# Patient Record
Sex: MALE | Race: WHITE | Employment: OTHER | URBAN - METROPOLITAN AREA
[De-identification: names, ages, dates, MRNs, and addresses within clinical notes are randomized per-mention and may not be internally consistent; named-entity substitution may affect disease eponyms.]

---

## 2018-01-12 ENCOUNTER — HOSPITAL ENCOUNTER (EMERGENCY)
Age: 58
Discharge: HOME | End: 2018-01-13
Attending: EMERGENCY MEDICINE
Payer: MEDICARE

## 2018-01-12 ENCOUNTER — APPOINTMENT (OUTPATIENT)
Dept: CT IMAGING | Age: 58
End: 2018-01-12
Attending: EMERGENCY MEDICINE
Payer: MEDICARE

## 2018-01-12 ENCOUNTER — APPOINTMENT (OUTPATIENT)
Dept: GENERAL RADIOLOGY | Age: 58
End: 2018-01-12
Attending: EMERGENCY MEDICINE
Payer: MEDICARE

## 2018-01-12 DIAGNOSIS — K59.00 CONSTIPATION, UNSPECIFIED CONSTIPATION TYPE: ICD-10-CM

## 2018-01-12 DIAGNOSIS — R10.84 ABDOMINAL PAIN, GENERALIZED: ICD-10-CM

## 2018-01-12 DIAGNOSIS — R53.1 GENERALIZED WEAKNESS: Primary | ICD-10-CM

## 2018-01-12 DIAGNOSIS — E86.0 DEHYDRATION: ICD-10-CM

## 2018-01-12 DIAGNOSIS — R63.4 WEIGHT LOSS: ICD-10-CM

## 2018-01-12 DIAGNOSIS — R06.02 SOB (SHORTNESS OF BREATH): ICD-10-CM

## 2018-01-12 LAB
ALBUMIN SERPL-MCNC: 3.4 G/DL (ref 3.4–5)
ALBUMIN/GLOB SERPL: 1.1 {RATIO} (ref 0.8–1.7)
ALP SERPL-CCNC: 86 U/L (ref 45–117)
ALT SERPL-CCNC: 20 U/L (ref 16–61)
AMPHET UR QL SCN: NEGATIVE
ANION GAP BLD CALC-SCNC: 25 MMOL/L (ref 10–20)
ANION GAP SERPL CALC-SCNC: 20 MMOL/L (ref 3–18)
ANION GAP SERPL CALC-SCNC: 9 MMOL/L (ref 3–18)
APPEARANCE UR: CLEAR
AST SERPL-CCNC: 13 U/L (ref 15–37)
ATRIAL RATE: 90 BPM
BACTERIA URNS QL MICRO: NEGATIVE /HPF
BARBITURATES UR QL SCN: NEGATIVE
BENZODIAZ UR QL: NEGATIVE
BILIRUB SERPL-MCNC: 0.6 MG/DL (ref 0.2–1)
BILIRUB UR QL: NEGATIVE
BNP SERPL-MCNC: 35 PG/ML (ref 0–900)
BUN BLD-MCNC: 21 MG/DL (ref 7–18)
BUN SERPL-MCNC: 19 MG/DL (ref 7–18)
BUN SERPL-MCNC: 24 MG/DL (ref 7–18)
BUN/CREAT SERPL: 23 (ref 12–20)
BUN/CREAT SERPL: 25 (ref 12–20)
CA-I BLD-MCNC: 1.27 MMOL/L (ref 1.12–1.32)
CALCIUM SERPL-MCNC: 7.7 MG/DL (ref 8.5–10.1)
CALCIUM SERPL-MCNC: 9.6 MG/DL (ref 8.5–10.1)
CALCULATED P AXIS, ECG09: 90 DEGREES
CALCULATED R AXIS, ECG10: 66 DEGREES
CALCULATED T AXIS, ECG11: 63 DEGREES
CANNABINOIDS UR QL SCN: NEGATIVE
CAOX CRY URNS QL MICRO: ABNORMAL
CHLORIDE BLD-SCNC: 105 MMOL/L (ref 100–108)
CHLORIDE SERPL-SCNC: 106 MMOL/L (ref 100–108)
CHLORIDE SERPL-SCNC: 117 MMOL/L (ref 100–108)
CK MB CFR SERPL CALC: ABNORMAL % (ref 0–4)
CK MB CFR SERPL CALC: ABNORMAL % (ref 0–4)
CK MB SERPL-MCNC: <1 NG/ML (ref 5–25)
CK MB SERPL-MCNC: <1 NG/ML (ref 5–25)
CK SERPL-CCNC: 29 U/L (ref 39–308)
CK SERPL-CCNC: 38 U/L (ref 39–308)
CO2 BLD-SCNC: 23 MMOL/L (ref 19–24)
CO2 SERPL-SCNC: 22 MMOL/L (ref 21–32)
CO2 SERPL-SCNC: 25 MMOL/L (ref 21–32)
COCAINE UR QL SCN: NEGATIVE
COLOR UR: ABNORMAL
CREAT SERPL-MCNC: 0.77 MG/DL (ref 0.6–1.3)
CREAT SERPL-MCNC: 1.05 MG/DL (ref 0.6–1.3)
CREAT UR-MCNC: 0.9 MG/DL (ref 0.6–1.3)
DIAGNOSIS, 93000: NORMAL
EPITH CASTS URNS QL MICRO: ABNORMAL /LPF (ref 0–5)
ERYTHROCYTE [DISTWIDTH] IN BLOOD BY AUTOMATED COUNT: 16.3 % (ref 11.6–14.5)
ETHANOL SERPL-MCNC: <3 MG/DL (ref 0–3)
FLUAV AG NPH QL IA: NEGATIVE
FLUBV AG NOSE QL IA: NEGATIVE
GLOBULIN SER CALC-MCNC: 3.2 G/DL (ref 2–4)
GLUCOSE BLD STRIP.AUTO-MCNC: 119 MG/DL (ref 74–106)
GLUCOSE BLD STRIP.AUTO-MCNC: 92 MG/DL (ref 70–110)
GLUCOSE SERPL-MCNC: 100 MG/DL (ref 74–99)
GLUCOSE SERPL-MCNC: 151 MG/DL (ref 74–99)
GLUCOSE UR STRIP.AUTO-MCNC: >1000 MG/DL
HCT VFR BLD AUTO: 41.8 % (ref 36–48)
HCT VFR BLD CALC: 34 % (ref 36–49)
HDSCOM,HDSCOM: NORMAL
HGB BLD-MCNC: 11.6 G/DL (ref 12–16)
HGB BLD-MCNC: 13.8 G/DL (ref 13–16)
HGB UR QL STRIP: NEGATIVE
KETONES UR QL STRIP.AUTO: >160 MG/DL
LACTATE BLD-SCNC: 1.4 MMOL/L (ref 0.4–2)
LACTATE BLD-SCNC: 3.4 MMOL/L (ref 0.4–2)
LEUKOCYTE ESTERASE UR QL STRIP.AUTO: NEGATIVE
LIPASE SERPL-CCNC: 54 U/L (ref 73–393)
MCH RBC QN AUTO: 29.4 PG (ref 24–34)
MCHC RBC AUTO-ENTMCNC: 33 G/DL (ref 31–37)
MCV RBC AUTO: 89.1 FL (ref 74–97)
METHADONE UR QL: NEGATIVE
NITRITE UR QL STRIP.AUTO: NEGATIVE
OPIATES UR QL: NEGATIVE
P-R INTERVAL, ECG05: 154 MS
PCP UR QL: NEGATIVE
PH UR STRIP: 6 [PH] (ref 5–8)
PLATELET # BLD AUTO: 188 K/UL (ref 135–420)
PMV BLD AUTO: 11.3 FL (ref 9.2–11.8)
POTASSIUM BLD-SCNC: 3.6 MMOL/L (ref 3.5–5.5)
POTASSIUM SERPL-SCNC: 3.7 MMOL/L (ref 3.5–5.5)
POTASSIUM SERPL-SCNC: 4 MMOL/L (ref 3.5–5.5)
PROT SERPL-MCNC: 6.6 G/DL (ref 6.4–8.2)
PROT UR STRIP-MCNC: ABNORMAL MG/DL
Q-T INTERVAL, ECG07: 368 MS
QRS DURATION, ECG06: 78 MS
QTC CALCULATION (BEZET), ECG08: 450 MS
RBC # BLD AUTO: 4.69 M/UL (ref 4.7–5.5)
RBC #/AREA URNS HPF: ABNORMAL /HPF (ref 0–5)
SODIUM BLD-SCNC: 148 MMOL/L (ref 136–145)
SODIUM SERPL-SCNC: 148 MMOL/L (ref 136–145)
SODIUM SERPL-SCNC: 151 MMOL/L (ref 136–145)
SP GR UR REFRACTOMETRY: >1.03 (ref 1–1.03)
TROPONIN I SERPL-MCNC: <0.02 NG/ML (ref 0–0.04)
TROPONIN I SERPL-MCNC: <0.02 NG/ML (ref 0–0.04)
UROBILINOGEN UR QL STRIP.AUTO: 1 EU/DL (ref 0.2–1)
VENTRICULAR RATE, ECG03: 90 BPM
WBC # BLD AUTO: 7.9 K/UL (ref 4.6–13.2)
WBC URNS QL MICRO: ABNORMAL /HPF (ref 0–4)

## 2018-01-12 PROCEDURE — 74011636320 HC RX REV CODE- 636/320: Performed by: EMERGENCY MEDICINE

## 2018-01-12 PROCEDURE — 87040 BLOOD CULTURE FOR BACTERIA: CPT | Performed by: EMERGENCY MEDICINE

## 2018-01-12 PROCEDURE — 80048 BASIC METABOLIC PNL TOTAL CA: CPT | Performed by: EMERGENCY MEDICINE

## 2018-01-12 PROCEDURE — 83880 ASSAY OF NATRIURETIC PEPTIDE: CPT | Performed by: EMERGENCY MEDICINE

## 2018-01-12 PROCEDURE — 93005 ELECTROCARDIOGRAM TRACING: CPT

## 2018-01-12 PROCEDURE — 83690 ASSAY OF LIPASE: CPT | Performed by: EMERGENCY MEDICINE

## 2018-01-12 PROCEDURE — 80047 BASIC METABLC PNL IONIZED CA: CPT

## 2018-01-12 PROCEDURE — 85027 COMPLETE CBC AUTOMATED: CPT | Performed by: EMERGENCY MEDICINE

## 2018-01-12 PROCEDURE — 74011250636 HC RX REV CODE- 250/636: Performed by: EMERGENCY MEDICINE

## 2018-01-12 PROCEDURE — 83605 ASSAY OF LACTIC ACID: CPT

## 2018-01-12 PROCEDURE — 87804 INFLUENZA ASSAY W/OPTIC: CPT | Performed by: EMERGENCY MEDICINE

## 2018-01-12 PROCEDURE — 96361 HYDRATE IV INFUSION ADD-ON: CPT

## 2018-01-12 PROCEDURE — 80307 DRUG TEST PRSMV CHEM ANLYZR: CPT | Performed by: EMERGENCY MEDICINE

## 2018-01-12 PROCEDURE — 96374 THER/PROPH/DIAG INJ IV PUSH: CPT

## 2018-01-12 PROCEDURE — 82550 ASSAY OF CK (CPK): CPT | Performed by: EMERGENCY MEDICINE

## 2018-01-12 PROCEDURE — 99285 EMERGENCY DEPT VISIT HI MDM: CPT

## 2018-01-12 PROCEDURE — 74011000258 HC RX REV CODE- 258: Performed by: EMERGENCY MEDICINE

## 2018-01-12 PROCEDURE — 80053 COMPREHEN METABOLIC PANEL: CPT | Performed by: EMERGENCY MEDICINE

## 2018-01-12 PROCEDURE — 82962 GLUCOSE BLOOD TEST: CPT

## 2018-01-12 PROCEDURE — 71275 CT ANGIOGRAPHY CHEST: CPT

## 2018-01-12 PROCEDURE — 81001 URINALYSIS AUTO W/SCOPE: CPT | Performed by: EMERGENCY MEDICINE

## 2018-01-12 PROCEDURE — 71045 X-RAY EXAM CHEST 1 VIEW: CPT

## 2018-01-12 PROCEDURE — 74177 CT ABD & PELVIS W/CONTRAST: CPT

## 2018-01-12 RX ORDER — CITALOPRAM 40 MG/1
TABLET, FILM COATED ORAL DAILY
Status: ON HOLD | COMMUNITY
End: 2018-02-08

## 2018-01-12 RX ORDER — ACETAMINOPHEN 500 MG
1000 TABLET ORAL
Status: DISPENSED | OUTPATIENT
Start: 2018-01-12 | End: 2018-01-13

## 2018-01-12 RX ORDER — INSULIN GLARGINE 100 [IU]/ML
14 INJECTION, SOLUTION SUBCUTANEOUS
COMMUNITY
End: 2018-03-03

## 2018-01-12 RX ORDER — SODIUM CHLORIDE 9 MG/ML
100 INJECTION, SOLUTION INTRAVENOUS
Status: COMPLETED | OUTPATIENT
Start: 2018-01-12 | End: 2018-01-12

## 2018-01-12 RX ORDER — NORTRIPTYLINE HYDROCHLORIDE 75 MG/1
150 CAPSULE ORAL
COMMUNITY

## 2018-01-12 RX ORDER — SODIUM CHLORIDE 0.9 % (FLUSH) 0.9 %
5-10 SYRINGE (ML) INJECTION AS NEEDED
Status: DISCONTINUED | OUTPATIENT
Start: 2018-01-12 | End: 2018-01-13 | Stop reason: HOSPADM

## 2018-01-12 RX ORDER — INSULIN ASPART 100 [IU]/ML
INJECTION, SOLUTION INTRAVENOUS; SUBCUTANEOUS
COMMUNITY
End: 2018-03-03

## 2018-01-12 RX ORDER — DIPHENHYDRAMINE HYDROCHLORIDE 50 MG/ML
25 INJECTION, SOLUTION INTRAMUSCULAR; INTRAVENOUS
Status: COMPLETED | OUTPATIENT
Start: 2018-01-12 | End: 2018-01-12

## 2018-01-12 RX ORDER — PRAMIPEXOLE DIHYDROCHLORIDE 0.12 MG/1
0.12 TABLET ORAL 3 TIMES DAILY
COMMUNITY

## 2018-01-12 RX ORDER — MODAFINIL 200 MG/1
200 TABLET ORAL DAILY
COMMUNITY

## 2018-01-12 RX ADMIN — SODIUM CHLORIDE 100 ML: 900 INJECTION, SOLUTION INTRAVENOUS at 15:21

## 2018-01-12 RX ADMIN — SODIUM CHLORIDE 1578 ML: 900 INJECTION, SOLUTION INTRAVENOUS at 14:23

## 2018-01-12 RX ADMIN — SODIUM CHLORIDE 1000 ML: 900 INJECTION, SOLUTION INTRAVENOUS at 18:51

## 2018-01-12 RX ADMIN — SODIUM CHLORIDE 1000 ML: 900 INJECTION, SOLUTION INTRAVENOUS at 18:01

## 2018-01-12 RX ADMIN — IOPAMIDOL 69 ML: 755 INJECTION, SOLUTION INTRAVENOUS at 15:20

## 2018-01-12 RX ADMIN — DIPHENHYDRAMINE HYDROCHLORIDE 25 MG: 50 INJECTION, SOLUTION INTRAMUSCULAR; INTRAVENOUS at 13:57

## 2018-01-12 NOTE — ED TRIAGE NOTES
Pt brought in via EMS with c/o \"can't breath, SAO2 100 % on Room Air , resp fluctuating from 18-30 per minute. Pt with pysch history, DM1, took meds this am per EMS. Refuses to put on O2.

## 2018-01-12 NOTE — ED NOTES
Purposeful rounding completed:    Side rails up x 2:  YES  Bed in low position and wheels locked: YES  Call bell within reach: YES  Comfort addressed: YES    Toileting needs addressed: YES  Plan of care reviewed/updated with patient and or family members: YES  IV site assessed: YES  Pain assessed and addressed: YES, 0      Elevated lactic acid , Dr Eduar Maddox aware, Sepsis protocal initiated.

## 2018-01-12 NOTE — ED NOTES
Bedside report received from Agustín Mcallister RN  Pt resting on the stretcher in NAD. Pertinent information reviewed including results, medication administration, and SBAR. Any questions pt presents answered. Any needs addressed.

## 2018-01-12 NOTE — ED NOTES
Pt attempted to use urinal to provide urine sample  Unable to urinate  Repeat lactate acid level drawn per protocol

## 2018-01-12 NOTE — ED NOTES
Purposeful rounding completed:    Side rails up x 2:  YES  Bed in low position and wheels locked: YES  Call bell within reach: YES  Comfort addressed: YES    Toileting needs addressed: YES  Plan of care reviewed/updated with patient and or family members: YES, Brother at bedside, and updated with plan of care.   IV site assessed: YES  Pain assessed and addressed: YES, 0

## 2018-01-12 NOTE — ED PROVIDER NOTES
EMERGENCY DEPARTMENT HISTORY AND PHYSICAL EXAM    1:20 PM      Date: 1/12/2018  Patient Name: Elisha Meng    History of Presenting Illness     Chief Complaint   Patient presents with    Shortness of Breath         History Provided By: Patient, Patient's Brother and EMS    Chief Complaint: Generalized weakness   Duration: 2 Days  Timing:  Constant, Progressive and Worsening  Location: Generalized   Quality: N/A  Severity: Moderate  Modifying Factors: None  Associated Symptoms: fatigue, abdominal pain, change in mental status, unexpected weight change, SOB, depression and appetite change      Additional History (Context): Elisha Meng is a 56 y.o. male with DM, depression and hypotension who presents to the ED via EMS with a chief complaint of constant and progressively worsening generalized weakness for the past 2 days with associated symptoms of fatigue, abdominal pain, change in mental status, unexpected weight change, SOB, depression and appetite change. EMS states that the patient's brother said Tramaine Montes has SOB for 2 days and has a deterioration of mental status and had unexpected weight change for the last 2 weeks. \" Patient keeps repeating \"I can't breathe\" during the physical exam by ED physician. Patient reports he has diffuse abdominal pain. Patient also reports he is depressed and sad. Patient is twitching and grinding his teeth while in the ED bed. Patient does not state any alleviating or precipitating factors. Patient denies SI, HI, auditory hallucinations or any other symptoms or complaints. PCP: None        Past History     Past Medical History:  Past Medical History:   Diagnosis Date    Diabetes ([address]     Ill-defined condition     hypotension    Psychiatric disorder     depression       Past Surgical History:  History reviewed. No pertinent surgical history. Family History:  History reviewed. No pertinent family history.     Social History:  Social History   Substance Use Topics    Smoking status: Former Smoker    Smokeless tobacco: Never Used    Alcohol use No       Allergies:  No Known Allergies      Review of Systems       Review of Systems   Constitutional: Positive for appetite change and unexpected weight change. Negative for activity change, fatigue and fever. HENT: Negative for rhinorrhea. Eyes: Negative for visual disturbance. Respiratory: Positive for shortness of breath. Cardiovascular: Negative for chest pain and palpitations. Gastrointestinal: Positive for abdominal pain. Negative for diarrhea, nausea and vomiting. Genitourinary: Negative for dysuria and hematuria. Musculoskeletal: Negative for back pain. Skin: Negative for rash. Neurological: Negative for dizziness, weakness and light-headedness. Psychiatric/Behavioral: Positive for behavioral problems. Negative for agitation, hallucinations, self-injury and suicidal ideas. Depressed and sad   All other systems reviewed and are negative. Physical Exam     Patient Vitals for the past 12 hrs:   Temp Pulse Resp BP SpO2   01/12/18 1730 - 94 29 118/62 98 %   01/12/18 1700 - 92 24 116/59 98 %   01/12/18 1630 - 88 24 102/52 -   01/12/18 1615 - 89 (!) 33 99/53 -   01/12/18 1600 - 86 27 97/52 -   01/12/18 1545 - 86 26 102/53 -   01/12/18 1530 - - - 96/47 -   01/12/18 1500 - 85 24 110/62 96 %   01/12/18 1445 - 84 30 107/62 98 %   01/12/18 1345 - 92 29 92/61 93 %   01/12/18 1330 - 95 (!) 36 107/65 98 %   01/12/18 1315 - 92 25 108/65 99 %   01/12/18 1300 99 °F (37.2 °C) 90 (!) 31 114/51 100 %           Physical Exam   Constitutional: He appears well-developed and well-nourished. HENT:   Head: Normocephalic and atraumatic. Patient is grinding his teeth during physical exam.    Eyes: Conjunctivae are normal. Pupils are equal, round, and reactive to light. Neck: Normal range of motion. Neck supple. Cardiovascular: Normal rate and regular rhythm. Heart is clear.     Pulmonary/Chest: Effort normal and breath sounds normal.   Lungs are clear. Abdominal: Soft. Bowel sounds are normal. There is generalized tenderness. There is no rigidity, no rebound and no guarding. Musculoskeletal: Normal range of motion. Patient is twisting his fingers during the physical exam.    Lymphadenopathy:     He has no cervical adenopathy. Neurological: He is alert. Skin: Skin is warm. Frontal forehead scar is well-healed. Psychiatric: He has a normal mood and affect.          Diagnostic Study Results     Labs -  Recent Results (from the past 12 hour(s))   EKG, 12 LEAD, INITIAL    Collection Time: 01/12/18  1:01 PM   Result Value Ref Range    Ventricular Rate 90 BPM    Atrial Rate 90 BPM    P-R Interval 154 ms    QRS Duration 78 ms    Q-T Interval 368 ms    QTC Calculation (Bezet) 450 ms    Calculated P Axis 90 degrees    Calculated R Axis 66 degrees    Calculated T Axis 63 degrees    Diagnosis       Normal sinus rhythm  Normal ECG  No previous ECGs available  Confirmed by Abdi Quintanilla ([id number]) on 1/12/2018 2:11:15 PM     CARDIAC PANEL,(CK, CKMB & TROPONIN)    Collection Time: 01/12/18  1:40 PM   Result Value Ref Range    CK 38 (L) 39 - 308 U/L    CK - MB <1.0 <3.6 ng/ml    CK-MB Index  0.0 - 4.0 %     CALCULATION NOT PERFORMED WHEN RESULT IS BELOW LINEAR LIMIT    Troponin-I, Qt. <0.02 0.0 - 0.045 NG/ML   CBC W/O DIFF    Collection Time: 01/12/18  1:40 PM   Result Value Ref Range    WBC 7.9 4.6 - 13.2 K/uL    RBC 4.69 (L) 4.70 - 5.50 M/uL    HGB 13.8 13.0 - 16.0 g/dL    HCT 41.8 36.0 - 48.0 %    MCV 89.1 74.0 - 97.0 FL    MCH 29.4 24.0 - 34.0 PG    MCHC 33.0 31.0 - 37.0 g/dL    RDW 16.3 (H) 11.6 - 14.5 %    PLATELET 502 399 - 111 K/uL    MPV 11.3 9.2 - 34.2 FL   METABOLIC PANEL, COMPREHENSIVE    Collection Time: 01/12/18  1:40 PM   Result Value Ref Range    Sodium 148 (H) 136 - 145 mmol/L    Potassium 4.0 3.5 - 5.5 mmol/L    Chloride 106 100 - 108 mmol/L    CO2 22 21 - 32 mmol/L    Anion gap 20 (H) 3.0 - 18 mmol/L Glucose 151 (H) 74 - 99 mg/dL    BUN 24 (H) 7.0 - 18 MG/DL    Creatinine 1.05 0.6 - 1.3 MG/DL    BUN/Creatinine ratio 23 (H) 12 - 20      GFR est AA >60 >60 ml/min/1.73m2    GFR est non-AA >60 >60 ml/min/1.73m2    Calcium 9.6 8.5 - 10.1 MG/DL    Bilirubin, total 0.6 0.2 - 1.0 MG/DL    ALT (SGPT) 20 16 - 61 U/L    AST (SGOT) 13 (L) 15 - 37 U/L    Alk.  phosphatase 86 45 - 117 U/L    Protein, total 6.6 6.4 - 8.2 g/dL    Albumin 3.4 3.4 - 5.0 g/dL    Globulin 3.2 2.0 - 4.0 g/dL    A-G Ratio 1.1 0.8 - 1.7     NT-PRO BNP    Collection Time: 01/12/18  1:40 PM   Result Value Ref Range    NT pro-BNP 35 0 - 900 PG/ML   LIPASE    Collection Time: 01/12/18  1:40 PM   Result Value Ref Range    Lipase 54 (L) 73 - 393 U/L   ETHYL ALCOHOL    Collection Time: 01/12/18  1:40 PM   Result Value Ref Range    ALCOHOL(ETHYL),SERUM <3 0 - 3 MG/DL   POC LACTIC ACID    Collection Time: 01/12/18  1:40 PM   Result Value Ref Range    Lactic Acid (POC) 3.4 (HH) 0.4 - 2.0 mmol/L   CULTURE, BLOOD    Collection Time: 01/12/18  2:03 PM   Result Value Ref Range    Special Requests: PERIPHERAL      Culture result: PENDING    INFLUENZA A & B AG (RAPID TEST)    Collection Time: 01/12/18  2:15 PM   Result Value Ref Range    Influenza A Antigen NEGATIVE  NEG      Influenza B Antigen NEGATIVE  NEG     CULTURE, BLOOD    Collection Time: 01/12/18  2:50 PM   Result Value Ref Range    Special Requests: PERIPHERAL      Culture result: PENDING    POC LACTIC ACID    Collection Time: 01/12/18  5:39 PM   Result Value Ref Range    Lactic Acid (POC) 1.4 0.4 - 2.0 mmol/L   URINALYSIS W/ RFLX MICROSCOPIC    Collection Time: 01/12/18  5:55 PM   Result Value Ref Range    Color DARK YELLOW      Appearance CLEAR      Specific gravity >1.030 (H) 1.005 - 1.030    pH (UA) 6.0 5.0 - 8.0      Protein TRACE (A) NEG mg/dL    Glucose >1000 (A) NEG mg/dL    Ketone >160 (A) NEG mg/dL    Bilirubin NEGATIVE  NEG      Blood NEGATIVE  NEG      Urobilinogen 1.0 0.2 - 1.0 EU/dL Nitrites NEGATIVE  NEG      Leukocyte Esterase NEGATIVE  NEG     DRUG SCREEN, URINE    Collection Time: 01/12/18  5:55 PM   Result Value Ref Range    BENZODIAZEPINES NEGATIVE  NEG      BARBITURATES NEGATIVE  NEG      THC (TH-CANNABINOL) NEGATIVE  NEG      OPIATES NEGATIVE  NEG      PCP(PHENCYCLIDINE) NEGATIVE  NEG      COCAINE NEGATIVE  NEG      AMPHETAMINES NEGATIVE  NEG      METHADONE NEGATIVE       HDSCOM (NOTE)    URINE MICROSCOPIC ONLY    Collection Time: 01/12/18  5:55 PM   Result Value Ref Range    WBC 0 to 3 0 - 4 /hpf    RBC 0 to 3 0 - 5 /hpf    Epithelial cells FEW 0 - 5 /lpf    Bacteria NEGATIVE  NEG /hpf    CA Oxalate crystals FEW (A) NEG     POC CHEM8    Collection Time: 01/12/18  6:09 PM   Result Value Ref Range    CO2, POC 23 19 - 24 MMOL/L    Glucose,  (H) 74 - 106 MG/DL    BUN, POC 21 (H) 7 - 18 MG/DL    Creatinine, POC 0.9 0.6 - 1.3 MG/DL    GFRAA, POC >60 >60 ml/min/1.73m2    GFRNA, POC >60 >60 ml/min/1.73m2    Sodium,  (H) 136 - 145 MMOL/L    Potassium, POC 3.6 3.5 - 5.5 MMOL/L    Calcium, ionized (POC) 1.27 1.12 - 1.32 MMOL/L    Chloride,  100 - 108 MMOL/L    Anion gap, POC 25 (H) 10 - 20      Hematocrit, POC 34 (L) 36 - 49 %    Hemoglobin, POC 11.6 (L) 12 - 16 G/DL   EKG, 12 LEAD, SUBSEQUENT    Collection Time: 01/12/18  7:03 PM   Result Value Ref Range    Ventricular Rate 82 BPM    Atrial Rate 82 BPM    P-R Interval 160 ms    QRS Duration 88 ms    Q-T Interval 382 ms    QTC Calculation (Bezet) 446 ms    Calculated P Axis 50 degrees    Calculated R Axis 65 degrees    Calculated T Axis 56 degrees    Diagnosis       Normal sinus rhythm  Normal ECG  When compared with ECG of 12-JAN-2018 13:01,  No significant change was found         Radiologic Studies -   CT ABD PELV W CONT   IMPRESSION:     1. No acute intra-abdominal or pelvic abnormality present. 2. Evidence of bowel obstruction. Considerable burden of stool in the large  intestine noted.  Correlation for symptoms of constipation may be helpful.             CTA CHEST W OR W WO CONT   IMPRESSION:     1. No evidence of pulmonary embolism.     2. No pneumothorax, pleural effusion, or focal alveolar opacity.     3. No acute osseous abnormality. Chronic, ununited right clavicular fracture. XR CHEST PORT   Impression:     1. No acute radiographic cardiopulmonary abnormality. 2. Chronic mid body right clavicular fracture. Medical Decision Making     Provider Notes (Medical Decision Making): Blue Goodwin is a 56 y.o. male presents with generalized fatigue, change in appetite, weight loss and abdominal pain and tenderness. Patient has a history of psychiatric illness and depression, but no SI, HI or AH. I'm unclear of patient's etiology as patient is a poor historian and poor communicator due to psychiatric illness. I will check cardiac enzymes for MI, CXR for PNA along with electrolytes, UA and CT Abdomen. If all the labs and radiological studies are negative, I'll get psychiatric consult for further management of patient. I am the first provider for this patient. I reviewed the vital signs, available nursing notes, past medical history, past surgical history, family history and social history. Vital Signs-Reviewed the patient's vital signs. Pulse Oximetry Analysis -  97% on room air (Interpretation)    EKG: Interpreted by the EP. Time Interpreted: 1:01 PM   Rate: 90   Rhythm: NSR   Interpretation: Normal intervals, normal axis, no obvious signs of ischemia. Comparison: no prior available to compare. REPEAT EKG:  Rhythm: NSR  Rate: 82 bpm  Interpretation: Normal intervals, normal axis.  No change to prior from today at 1:01 PM.   EKG interpret by Alisa Cowden, MD 7:03 PM      Records Reviewed: Nursing Notes, Old Medical Records and Previous electrocardiograms (Time of Review: 1:20 PM)    ED Course: Progress Notes, Reevaluation, and Consults:  1:57 PM  Re-evaluated the patient and he complaints of SOB. I will get CT Chest in order to rule out PE.     3:09 PM  Patient's brother is at bedside and states that the patient has psychiatric illness and also had a fall 3 weeks ago with craniectomy performed at [facility]. Patient has been home with brother for 5 weeks and has been getting physical and speech therapy. Brother states patient is moving around and getting better. Brother reports patient was complaining of SOB yeserday with no abd pain. SOB is a new finding and patient does not have any cardiac concerns at this time. 6:54 PM  Patient is re-evaluated and is in no distress and resting comfortably with normal vitals. Patient has an anion gap of 25, but has no signs of DKA at this time. Patient had significant dehydration and even with 2.5 L of fluids, he only urinated a small amount. I will continue to hydrate the patient. Patient has normal kidney function at the ED. Patient's brother reports that the patient does not have SI, HI or AH. As per the family, patient is much withdrawn and is not eating and taking care of himself. I will place a psychiatric consult as per family's request. I will discuss the patient's case with the oncoming physician as patient is getting hydrated before patient is medically cleared for psychiatric evaluation. 6:59 PM  Before clearing the patient for psychiatric evaluation, patient needs to be evaluated for improvement in hypernatremia, metabolic acidosis and anion gap of 25 as per the last set of labs along with a second set of troponin. Diagnosis     Clinical Impression:   1. Generalized weakness    2. SOB (shortness of breath)    3. Abdominal pain, generalized    4. Constipation, unspecified constipation type    5. Dehydration    6.  Weight loss        Disposition: Patient can be discharged after evaluation by psychiatry if no specific diagnosis to admit    Follow-up Information     None           Patient's Medications   Start Taking    No medications on file   Continue Taking    CITALOPRAM (CELEXA) 40 MG TABLET    Take  by mouth daily. Indications: ANXIETY WITH DEPRESSION    INSULIN ASPART (NOVOLOG) 100 UNIT/ML INJECTION    by SubCUTAneous route Before breakfast, lunch, dinner and at bedtime. Indications: type 1 diabetes mellitus    INSULIN GLARGINE (LANTUS) 100 UNIT/ML INJECTION    14 Units by SubCUTAneous route nightly. MODAFINIL (PROVIGIL) 200 MG TABLET    Take 200 mg by mouth daily. NORTRIPTYLINE (PAMELOR) 75 MG CAPSULE    Take 150 mg by mouth nightly. Indications: 150mg at dinner and 75mg at hs    PRAMIPEXOLE (MIRAPEX) 0.125 MG TABLET    Take 0.125 mg by mouth three (3) times daily. Indications: IDIOPATHIC PARKINSONISM   These Medications have changed    No medications on file   Stop Taking    No medications on file     _______________________________    7:00 PM: Pt care transferred to Dr. Mini Alcantara, ED provider. History of patient complaint(s), available diagnostic reports and current treatment plan has been discussed thoroughly. Bedside rounding on patient occured : Yes  Intended disposition of patient : home if cleared by psyciatry  Pending diagnostics reports and/or labs (please list): psychiatric evaluation and second set of troponin. Attestations:  Scribe Attestation     Jules Child acting as a scribe for and in the presence of Moris Kemp MD      January 12, 2018 at 1:20 PM       Provider Attestation:      I personally performed the services described in the documentation, reviewed the documentation, as recorded by the scribe in my presence, and it accurately and completely records my words and actions.  January 12, 2018 at 1:20 PM - Moris Kemp MD    _______________________________

## 2018-01-12 NOTE — ED NOTES
Pt straight catheterized for urine sample  Urine collected and sent to lab  1L bolus hung per VO Dr Kamran Li, earlier it was ordered and held by RN taking care of pt before I assumed care  [facility] Edited and bolus hung

## 2018-01-12 NOTE — ED NOTES
Brother at bedside. Per Brother pt has not had a BM in 7 days and pt only urinates aprx every 3 days.

## 2018-01-13 VITALS
HEIGHT: 66 IN | WEIGHT: 116 LBS | HEART RATE: 86 BPM | DIASTOLIC BLOOD PRESSURE: 59 MMHG | TEMPERATURE: 99 F | SYSTOLIC BLOOD PRESSURE: 114 MMHG | RESPIRATION RATE: 31 BRPM | BODY MASS INDEX: 18.64 KG/M2 | OXYGEN SATURATION: 99 %

## 2018-01-13 LAB
ATRIAL RATE: 82 BPM
CALCULATED P AXIS, ECG09: 50 DEGREES
CALCULATED R AXIS, ECG10: 65 DEGREES
CALCULATED T AXIS, ECG11: 56 DEGREES
DIAGNOSIS, 93000: NORMAL
P-R INTERVAL, ECG05: 160 MS
Q-T INTERVAL, ECG07: 382 MS
QRS DURATION, ECG06: 88 MS
QTC CALCULATION (BEZET), ECG08: 446 MS
VENTRICULAR RATE, ECG03: 82 BPM

## 2018-01-13 RX ORDER — ALPRAZOLAM 0.25 MG/1
0.25 TABLET ORAL
Qty: 10 TAB | Refills: 0 | Status: ON HOLD | OUTPATIENT
Start: 2018-01-13 | End: 2018-02-08

## 2018-01-13 NOTE — DISCHARGE INSTRUCTIONS
Dehydration: Care Instructions  Your Care Instructions  Dehydration happens when your body loses too much fluid. This might happen when you do not drink enough water or you lose large amounts of fluids from your body because of diarrhea, vomiting, or sweating. Severe dehydration can be life-threatening. Water and minerals called electrolytes help put your body fluids back in balance. Learn the early signs of fluid loss, and drink more fluids to prevent dehydration. Follow-up care is a key part of your treatment and safety. Be sure to make and go to all appointments, and call your doctor if you are having problems. It's also a good idea to know your test results and keep a list of the medicines you take. How can you care for yourself at home? · To prevent dehydration, drink plenty of fluids, enough so that your urine is light yellow or clear like water. Choose water and other caffeine-free clear liquids until you feel better. If you have kidney, heart, or liver disease and have to limit fluids, talk with your doctor before you increase the amount of fluids you drink. · If you do not feel like eating or drinking, try taking small sips of water, sports drinks, or other rehydration drinks. · Get plenty of rest.  To prevent dehydration  · Add more fluids to your diet and daily routine, unless your doctor has told you not to. · During hot weather, drink more fluids. Drink even more fluids if you exercise a lot. Stay away from drinks with alcohol or caffeine. · Watch for the symptoms of dehydration. These include:  ¨ A dry, sticky mouth. ¨ Dark yellow urine, and not much of it. ¨ Dry and sunken eyes. ¨ Feeling very tired. · Learn what problems can lead to dehydration. These include:  ¨ Diarrhea, fever, and vomiting. ¨ Any illness with a fever, such as pneumonia or the flu. ¨ Activities that cause heavy sweating, such as endurance races and heavy outdoor work in hot or humid weather.   ¨ Alcohol or drug abuse or withdrawal.  ¨ Certain medicines, such as cold and allergy pills (antihistamines), diet pills (diuretics), and laxatives. ¨ Certain diseases, such as diabetes, cancer, and heart or kidney disease. When should you call for help? Call 911 anytime you think you may need emergency care. For example, call if:  ? · You passed out (lost consciousness). ?Call your doctor now or seek immediate medical care if:  ? · You are confused and cannot think clearly. ? · You are dizzy or lightheaded, or you feel like you may faint. ? · You have signs of needing more fluids. You have sunken eyes and a dry mouth, and you pass only a little dark urine. ? · You cannot keep fluids down. ? Watch closely for changes in your health, and be sure to contact your doctor if:  ? · You are not making tears. ? · Your skin is very dry and sags slowly back into place after you pinch it. ? · Your mouth and eyes are very dry. Where can you learn more? Go to http://milan-argelia.info/. Enter Q138 in the search box to learn more about \"Dehydration: Care Instructions. \"  Current as of: March 20, 2017  Content Version: 11.4  © 3466-7714 [employer]. Care instructions adapted under license by [employer] (which disclaims liability or warranty for this information). If you have questions about a medical condition or this instruction, always ask your healthcare professional. [place] [address] any warranty or liability for your use of this information.

## 2018-01-13 NOTE — BSMART NOTE
Gifty Altman was seen at the request of Dr. Gonzalo Middleton for assistance in finding a psychiatric hospital bed for the patient. First met with Mr. Gifty Altman and his brother, Jordyn Edmonds, to confirm that the patient would like admission to a psychiatric hospital as per the recommendation of the Telepsychiatric Consult Report. Confirmed with them that yes patient is experiencing depression and a high anxiety level which is causing him to not adequately eat and drink fluids and would like admission to reduce his anxiety over eating and drinking. Per Dr. Gonzalo Middleton, the patient was medically cleared for psychiatric admission. Applied on behalf of patient to [facility] for admission to the psychiatric unit for inpatient admission. Talked with Breanne regarding the Telepsychiatric findings and provided her the medical record number, patient name and birth date so that she could obtain the chart to review the medical record with the admitting doctor for consideration of admission. Breanne of [facility] later called back to indicate that Dr. Alfonso Dash did not accept patient for [facility] \"  Informed Dr. Gonzalo Middleton of this patient's non-acceptance to [facility]. The patient and the brother were informed. Mr. Jordyn Edmonds expressed much concern over the denial of admission which was expressed to Dr. Gonzalo Middleton. Dr. Gonzalo Middleton met with the patient and his brother and the decision to discharge patient from the ER was made after much discussion. Radha Parker.  Dionna Kang  Crisis Intervention

## 2018-01-13 NOTE — CONSULTS
CC: Evaluation requested for anxiety    HPI: This is a 51yo male with reported history of depression, who sustained an intracranial bleed s/p head trauma in August 2017 while living in a nursing home. He was treated in the hospital for the injury and then to the inpatient psychiatric unit for depression. He currently lives with his brother. The patient was brought in today by his brother because he complained of difficulty breathing. The patient was found to be dehydrated. According to the brother, the patient has not been eating or drinking regularly. He only eats when the speech therapist is present because he is afraid that he will choke. The patient admits that he is very anxious at this time. He denies feeling suicidal in any way. His brother says that the patient has never attempted suicide and that he would never do such a thing. The patient is very difficult to interview directly and most of the history was obtained by the brother who eluded to the fact that the patient has also been diagnosed with Parkinson's disease a few years ago. He denied history of debi or paranoia now or in the past. The patient denies auditory/visual hallucinations but admits to feeling depressed. He is nevertheless hopeful and says that his brother keeps him going. Past Psychiatric history: the patient has been treated for depression for the past 10 years. According to the brother the depression appeared abruptly. The brother has not been employed since. He was living with the elderly parents until three years ago, when he was admitted to a nursing home. He had the head trauma in the nursing home (the brother thinks that the only explanation is low blood sugar for him falling and hitting his head). There have been no suicide attempts and no violent attempts. Substance use history: no alcohol use. Tried cannabis in his early adulthood. Quit smoking cigarettes ten years ago.     Medical history: Parkinson's disease, diabetes mellitus type II, recent head trauma with intracranial hemorrhage    Medications: pramipexole, citalopram, melatonin, nortriptyline    Allergies: NKDA    Family history: depression (mother), no suicide attempts    Social history: born and raised in [place] to an intact family. Has two brothers. He graduated from high school. 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 He has been on disability for 7 years. He was  for five years and  20 years ago. He currently lives with his brother. Legal history: denies. Mental status exam:  General: fatigued looking  male with significant TD lying in bed and breathing laborly at times, good eye contact, cooperative  Speech: latency, non spontaneous  Mood :\"depressed and anxious\"  Affect: flat  Thought process: very concrete  Thought content: denies suicidal/homicidal ideation, no delusions elicited  Perceptions: denies AH/VH  Insight: fair  Judgment: fair    Assessment:  51yo male with longstanding depression and new onset anxiety due to another medical condition. The patient remains at risk for a new dehydration. He is currently medically stable. His anxiety need to be controlled and he should be able to eat and drink sufficiently prior to discharge. Plan:  Admit patient to an inpatient psychiatric unit. Continue current psychiatric medications for the time being.  Patient may benefit from a small amount of xanax (0.25mg) to relieve his anxiety temporarily

## 2018-01-13 NOTE — ED NOTES
Pt discharged to home via w/c and in company of brother  Discharge instructions provided via discussion and handout. Teaching to brother and patient. Verbalized understanding. No questions voiced. Discharged with 1 RX.

## 2018-01-13 NOTE — ED NOTES
7:00 PM :Pt care assumed from Dr. Corinne Sobers, ED provider. Pt complaint(s), current treatment plan, progression and available diagnostic results have been discussed thoroughly. Rounding occurred: yes  Intended Disposition: TBD   Pending diagnostic reports and/or labs (please list): 2nd troponin, psych evaluation    8:00 PM Consult: I discussed care with Gala Jaquez (tele-psychiatry). It was a standard discussion, including history of patients chief complaint, available diagnostic results, and treatment course. She states that she will see the patient. 11:04 PM I spoke with Michel Palm from crisis, she states that she will come see the patient. Consult:  Discussed care with tele psych. Standard discussion; including history of patients chief complaint, available diagnostic results, and treatment course. Patient was seen by psychiatry, recommended inpatient, he is currently voluntary   Consult:  Discussed care with Michel Palm (CM) Standard discussion; including history of patients chief complaint, available diagnostic results, and treatment course. [facility] has declined patient, pending for [employer] evaluation  Patient's brother reports that she just discharge the patient he will take him home, discussed our findings, we did have additional dispositions 2 attempt however the brother wants to take him home to follow up with primary care physician. Discussed empiric treatment with Xanax as discussed by psychiatry that this is a reasonable course and will attempted before following up with primary care physician  Patient's presentation, history, physical exam and laboratory evaluations were reviewed. At this time patient was felt to be stable for outpatient management and follow with primary care/specialist.  Patient was instructed to return to the emergency department with any concerns.   Disposition:  Discharged home  Portions of this chart were created with Dragon medical speech to text program.   Unrecognized errors may be present. Scribe [id number] Hong Fraser [facility] acting as a scribe for and in the presence of Rashaad Haile MD      January 12, 2018 at 11:11 PM       Provider Attestation:      I personally performed the services described in the documentation, reviewed the documentation, as recorded by the scribe in my presence, and it accurately and completely records my words and actions.  January 12, 2018 at 11:11 PM - Rashaad Haile MD

## 2018-01-13 NOTE — ED NOTES
POC glucose completed d/t HX diabetes and pt not eating all day  92 BG  Pt refuses to eat or drink still

## 2018-01-18 LAB
BACTERIA SPEC CULT: NORMAL
BACTERIA SPEC CULT: NORMAL
SERVICE CMNT-IMP: NORMAL
SERVICE CMNT-IMP: NORMAL

## 2018-02-03 ENCOUNTER — APPOINTMENT (OUTPATIENT)
Dept: GENERAL RADIOLOGY | Age: 58
DRG: 371 | End: 2018-02-03
Attending: PHYSICIAN ASSISTANT
Payer: MEDICARE

## 2018-02-03 ENCOUNTER — HOSPITAL ENCOUNTER (INPATIENT)
Age: 58
LOS: 5 days | Discharge: NURSING, REHAB OR LONG-TERM CARE | DRG: 371 | End: 2018-02-08
Attending: EMERGENCY MEDICINE | Admitting: HOSPITALIST
Payer: MEDICARE

## 2018-02-03 ENCOUNTER — APPOINTMENT (OUTPATIENT)
Dept: CT IMAGING | Age: 58
DRG: 371 | End: 2018-02-03
Attending: PHYSICIAN ASSISTANT
Payer: MEDICARE

## 2018-02-03 DIAGNOSIS — R10.84 ABDOMINAL PAIN, GENERALIZED: ICD-10-CM

## 2018-02-03 DIAGNOSIS — R19.7 DIARRHEA OF PRESUMED INFECTIOUS ORIGIN: Primary | ICD-10-CM

## 2018-02-03 PROBLEM — G20 PARKINSON'S DISEASE (HCC): Status: ACTIVE | Noted: 2018-02-03

## 2018-02-03 PROBLEM — F99 PSYCHIATRIC DISORDER: Status: ACTIVE | Noted: 2018-02-03

## 2018-02-03 PROBLEM — A04.72 C. DIFFICILE COLITIS: Status: ACTIVE | Noted: 2018-02-03

## 2018-02-03 PROBLEM — S06.9XAA TRAUMATIC BRAIN INJURY: Status: ACTIVE | Noted: 2018-02-03

## 2018-02-03 LAB
ALBUMIN SERPL-MCNC: 2.7 G/DL (ref 3.4–5)
ALBUMIN/GLOB SERPL: 0.8 {RATIO} (ref 0.8–1.7)
ALP SERPL-CCNC: 100 U/L (ref 45–117)
ALT SERPL-CCNC: 12 U/L (ref 16–61)
AMORPH CRY URNS QL MICRO: ABNORMAL
ANION GAP SERPL CALC-SCNC: 12 MMOL/L (ref 3–18)
APPEARANCE UR: ABNORMAL
AST SERPL-CCNC: 10 U/L (ref 15–37)
ATRIAL RATE: 100 BPM
BACTERIA URNS QL MICRO: ABNORMAL /HPF
BASOPHILS # BLD: 0.1 K/UL (ref 0–0.06)
BASOPHILS NFR BLD: 0 % (ref 0–2)
BILIRUB SERPL-MCNC: 0.6 MG/DL (ref 0.2–1)
BILIRUB UR QL: NEGATIVE
BNP SERPL-MCNC: 165 PG/ML (ref 0–900)
BUN SERPL-MCNC: 26 MG/DL (ref 7–18)
BUN/CREAT SERPL: 23 (ref 12–20)
CALCIUM SERPL-MCNC: 8.9 MG/DL (ref 8.5–10.1)
CALCULATED P AXIS, ECG09: 78 DEGREES
CALCULATED R AXIS, ECG10: 69 DEGREES
CALCULATED T AXIS, ECG11: 73 DEGREES
CHLORIDE SERPL-SCNC: 107 MMOL/L (ref 100–108)
CK MB CFR SERPL CALC: NORMAL % (ref 0–4)
CK MB SERPL-MCNC: <1 NG/ML (ref 5–25)
CK SERPL-CCNC: 42 U/L (ref 39–308)
CO2 SERPL-SCNC: 29 MMOL/L (ref 21–32)
COLOR UR: ABNORMAL
CREAT SERPL-MCNC: 1.12 MG/DL (ref 0.6–1.3)
DIAGNOSIS, 93000: NORMAL
DIFFERENTIAL METHOD BLD: ABNORMAL
EOSINOPHIL # BLD: 0 K/UL (ref 0–0.4)
EOSINOPHIL NFR BLD: 0 % (ref 0–5)
EPITH CASTS URNS QL MICRO: ABNORMAL /LPF (ref 0–5)
ERYTHROCYTE [DISTWIDTH] IN BLOOD BY AUTOMATED COUNT: 15.5 % (ref 11.6–14.5)
EST. AVERAGE GLUCOSE BLD GHB EST-MCNC: 154 MG/DL
GLOBULIN SER CALC-MCNC: 3.5 G/DL (ref 2–4)
GLUCOSE BLD STRIP.AUTO-MCNC: 39 MG/DL (ref 70–110)
GLUCOSE BLD STRIP.AUTO-MCNC: 75 MG/DL (ref 70–110)
GLUCOSE SERPL-MCNC: 240 MG/DL (ref 74–99)
GLUCOSE UR STRIP.AUTO-MCNC: NEGATIVE MG/DL
HBA1C MFR BLD: 7 % (ref 4.2–5.6)
HCT VFR BLD AUTO: 44.4 % (ref 36–48)
HGB BLD-MCNC: 14.7 G/DL (ref 13–16)
HGB UR QL STRIP: NEGATIVE
KETONES UR QL STRIP.AUTO: 40 MG/DL
LACTATE BLD-SCNC: 1.4 MMOL/L (ref 0.4–2)
LEUKOCYTE ESTERASE UR QL STRIP.AUTO: ABNORMAL
LYMPHOCYTES # BLD: 0.9 K/UL (ref 0.9–3.6)
LYMPHOCYTES NFR BLD: 4 % (ref 21–52)
MCH RBC QN AUTO: 30.1 PG (ref 24–34)
MCHC RBC AUTO-ENTMCNC: 33.1 G/DL (ref 31–37)
MCV RBC AUTO: 90.8 FL (ref 74–97)
MONOCYTES # BLD: 1.1 K/UL (ref 0.05–1.2)
MONOCYTES NFR BLD: 4 % (ref 3–10)
NEUTS SEG # BLD: 23.6 K/UL (ref 1.8–8)
NEUTS SEG NFR BLD: 92 % (ref 40–73)
NITRITE UR QL STRIP.AUTO: NEGATIVE
P-R INTERVAL, ECG05: 140 MS
PH UR STRIP: 5.5 [PH] (ref 5–8)
PLATELET # BLD AUTO: 350 K/UL (ref 135–420)
PMV BLD AUTO: 9.8 FL (ref 9.2–11.8)
POTASSIUM SERPL-SCNC: 3.8 MMOL/L (ref 3.5–5.5)
PROT SERPL-MCNC: 6.2 G/DL (ref 6.4–8.2)
PROT UR STRIP-MCNC: 30 MG/DL
Q-T INTERVAL, ECG07: 418 MS
QRS DURATION, ECG06: 86 MS
QTC CALCULATION (BEZET), ECG08: 539 MS
RBC # BLD AUTO: 4.89 M/UL (ref 4.7–5.5)
RBC #/AREA URNS HPF: NEGATIVE /HPF (ref 0–5)
SODIUM SERPL-SCNC: 148 MMOL/L (ref 136–145)
SP GR UR REFRACTOMETRY: >1.03 (ref 1–1.03)
TROPONIN I SERPL-MCNC: <0.02 NG/ML (ref 0–0.04)
UROBILINOGEN UR QL STRIP.AUTO: 1 EU/DL (ref 0.2–1)
VENTRICULAR RATE, ECG03: 100 BPM
WBC # BLD AUTO: 25.6 K/UL (ref 4.6–13.2)
WBC URNS QL MICRO: ABNORMAL /HPF (ref 0–4)

## 2018-02-03 PROCEDURE — 83605 ASSAY OF LACTIC ACID: CPT

## 2018-02-03 PROCEDURE — 87493 C DIFF AMPLIFIED PROBE: CPT | Performed by: PHYSICIAN ASSISTANT

## 2018-02-03 PROCEDURE — 87045 FECES CULTURE AEROBIC BACT: CPT | Performed by: PHYSICIAN ASSISTANT

## 2018-02-03 PROCEDURE — 96374 THER/PROPH/DIAG INJ IV PUSH: CPT

## 2018-02-03 PROCEDURE — 81001 URINALYSIS AUTO W/SCOPE: CPT | Performed by: PHYSICIAN ASSISTANT

## 2018-02-03 PROCEDURE — 74011250636 HC RX REV CODE- 250/636: Performed by: HOSPITALIST

## 2018-02-03 PROCEDURE — 84484 ASSAY OF TROPONIN QUANT: CPT | Performed by: PHYSICIAN ASSISTANT

## 2018-02-03 PROCEDURE — 74011250636 HC RX REV CODE- 250/636: Performed by: PHYSICIAN ASSISTANT

## 2018-02-03 PROCEDURE — 83880 ASSAY OF NATRIURETIC PEPTIDE: CPT | Performed by: PHYSICIAN ASSISTANT

## 2018-02-03 PROCEDURE — 85025 COMPLETE CBC W/AUTO DIFF WBC: CPT | Performed by: PHYSICIAN ASSISTANT

## 2018-02-03 PROCEDURE — 77030011943

## 2018-02-03 PROCEDURE — 96361 HYDRATE IV INFUSION ADD-ON: CPT

## 2018-02-03 PROCEDURE — 80053 COMPREHEN METABOLIC PANEL: CPT | Performed by: PHYSICIAN ASSISTANT

## 2018-02-03 PROCEDURE — 74177 CT ABD & PELVIS W/CONTRAST: CPT

## 2018-02-03 PROCEDURE — 65660000000 HC RM CCU STEPDOWN

## 2018-02-03 PROCEDURE — 99285 EMERGENCY DEPT VISIT HI MDM: CPT

## 2018-02-03 PROCEDURE — 77010033678 HC OXYGEN DAILY

## 2018-02-03 PROCEDURE — 83036 HEMOGLOBIN GLYCOSYLATED A1C: CPT | Performed by: HOSPITALIST

## 2018-02-03 PROCEDURE — 74011250637 HC RX REV CODE- 250/637: Performed by: HOSPITALIST

## 2018-02-03 PROCEDURE — 74011250637 HC RX REV CODE- 250/637: Performed by: PHYSICIAN ASSISTANT

## 2018-02-03 PROCEDURE — 93005 ELECTROCARDIOGRAM TRACING: CPT

## 2018-02-03 PROCEDURE — 71046 X-RAY EXAM CHEST 2 VIEWS: CPT

## 2018-02-03 PROCEDURE — 87040 BLOOD CULTURE FOR BACTERIA: CPT | Performed by: PHYSICIAN ASSISTANT

## 2018-02-03 PROCEDURE — 82962 GLUCOSE BLOOD TEST: CPT

## 2018-02-03 PROCEDURE — 74011636320 HC RX REV CODE- 636/320: Performed by: EMERGENCY MEDICINE

## 2018-02-03 RX ORDER — INSULIN LISPRO 100 [IU]/ML
INJECTION, SOLUTION INTRAVENOUS; SUBCUTANEOUS
Status: DISCONTINUED | OUTPATIENT
Start: 2018-02-03 | End: 2018-02-05

## 2018-02-03 RX ORDER — NORTRIPTYLINE HYDROCHLORIDE 25 MG/1
150 CAPSULE ORAL
Status: DISCONTINUED | OUTPATIENT
Start: 2018-02-04 | End: 2018-02-08 | Stop reason: HOSPADM

## 2018-02-03 RX ORDER — PRAMIPEXOLE DIHYDROCHLORIDE 0.25 MG/1
0.12 TABLET ORAL 3 TIMES DAILY
Status: DISCONTINUED | OUTPATIENT
Start: 2018-02-03 | End: 2018-02-08 | Stop reason: HOSPADM

## 2018-02-03 RX ORDER — ALPRAZOLAM 0.5 MG/1
0.25 TABLET ORAL
Status: DISCONTINUED | OUTPATIENT
Start: 2018-02-03 | End: 2018-02-08 | Stop reason: HOSPADM

## 2018-02-03 RX ORDER — MODAFINIL 100 MG/1
200 TABLET ORAL DAILY
Status: DISCONTINUED | OUTPATIENT
Start: 2018-02-04 | End: 2018-02-08 | Stop reason: HOSPADM

## 2018-02-03 RX ORDER — LORAZEPAM 2 MG/ML
0.5 INJECTION INTRAMUSCULAR
Status: COMPLETED | OUTPATIENT
Start: 2018-02-03 | End: 2018-02-03

## 2018-02-03 RX ORDER — POTASSIUM CHLORIDE AND SODIUM CHLORIDE 450; 150 MG/100ML; MG/100ML
INJECTION, SOLUTION INTRAVENOUS CONTINUOUS
Status: DISCONTINUED | OUTPATIENT
Start: 2018-02-03 | End: 2018-02-04

## 2018-02-03 RX ORDER — ENOXAPARIN SODIUM 100 MG/ML
40 INJECTION SUBCUTANEOUS EVERY 24 HOURS
Status: DISCONTINUED | OUTPATIENT
Start: 2018-02-03 | End: 2018-02-08 | Stop reason: HOSPADM

## 2018-02-03 RX ORDER — CITALOPRAM 20 MG/1
40 TABLET, FILM COATED ORAL DAILY
Status: DISCONTINUED | OUTPATIENT
Start: 2018-02-04 | End: 2018-02-08 | Stop reason: HOSPADM

## 2018-02-03 RX ORDER — DIAZEPAM 5 MG/1
5 TABLET ORAL
Status: DISCONTINUED | OUTPATIENT
Start: 2018-02-03 | End: 2018-02-03

## 2018-02-03 RX ORDER — SODIUM CHLORIDE 0.9 % (FLUSH) 0.9 %
5-10 SYRINGE (ML) INJECTION AS NEEDED
Status: DISCONTINUED | OUTPATIENT
Start: 2018-02-03 | End: 2018-02-08 | Stop reason: HOSPADM

## 2018-02-03 RX ORDER — MAGNESIUM SULFATE 100 %
4 CRYSTALS MISCELLANEOUS AS NEEDED
Status: DISCONTINUED | OUTPATIENT
Start: 2018-02-03 | End: 2018-02-08 | Stop reason: HOSPADM

## 2018-02-03 RX ORDER — NORTRIPTYLINE HYDROCHLORIDE 25 MG/1
75 CAPSULE ORAL
Status: DISCONTINUED | OUTPATIENT
Start: 2018-02-03 | End: 2018-02-08 | Stop reason: HOSPADM

## 2018-02-03 RX ORDER — DEXTROSE 50 % IN WATER (D50W) INTRAVENOUS SYRINGE
25-50 AS NEEDED
Status: DISCONTINUED | OUTPATIENT
Start: 2018-02-03 | End: 2018-02-08 | Stop reason: HOSPADM

## 2018-02-03 RX ADMIN — SODIUM CHLORIDE AND POTASSIUM CHLORIDE: 4.5; 1.49 INJECTION, SOLUTION INTRAVENOUS at 22:35

## 2018-02-03 RX ADMIN — VANCOMYCIN HYDROCHLORIDE 125 MG: 1 INJECTION, POWDER, LYOPHILIZED, FOR SOLUTION INTRAVENOUS at 22:46

## 2018-02-03 RX ADMIN — ALPRAZOLAM 0.25 MG: 0.5 TABLET ORAL at 22:46

## 2018-02-03 RX ADMIN — SODIUM CHLORIDE 1000 ML: 900 INJECTION, SOLUTION INTRAVENOUS at 12:56

## 2018-02-03 RX ADMIN — IOPAMIDOL 100 ML: 612 INJECTION, SOLUTION INTRAVENOUS at 16:19

## 2018-02-03 RX ADMIN — SODIUM CHLORIDE 1000 ML: 9 INJECTION, SOLUTION INTRAVENOUS at 10:29

## 2018-02-03 RX ADMIN — VANCOMYCIN HYDROCHLORIDE 500 MG: 1 INJECTION, POWDER, LYOPHILIZED, FOR SOLUTION INTRAVENOUS at 15:49

## 2018-02-03 RX ADMIN — SODIUM CHLORIDE 1497 ML: 900 INJECTION, SOLUTION INTRAVENOUS at 11:30

## 2018-02-03 RX ADMIN — PRAMIPEXOLE DIHYDROCHLORIDE 0.12 MG: 0.25 TABLET ORAL at 22:46

## 2018-02-03 RX ADMIN — NORTRIPTYLINE HYDROCHLORIDE 75 MG: 25 CAPSULE ORAL at 22:45

## 2018-02-03 RX ADMIN — LORAZEPAM 0.5 MG: 2 INJECTION INTRAMUSCULAR; INTRAVENOUS at 13:27

## 2018-02-03 RX ADMIN — GLUCAGON HYDROCHLORIDE 1 MG: KIT at 22:35

## 2018-02-03 NOTE — ED NOTES
Patient having diarrhea at this time, very foul smelling. Sample collected and provider made aware. Enteric precautions placed.

## 2018-02-03 NOTE — ED PROVIDER NOTES
EMERGENCY DEPARTMENT HISTORY AND PHYSICAL EXAM    10:26 AM      Date: 2/3/2018  Patient Name: Sophia Bridges    History of Presenting Illness     Chief Complaint   Patient presents with    Anxiety    Shortness of Breath    Diarrhea         History Provided By: Patient and EMS    Chief Complaint: diarrhea  Duration:  Days  Timing:  Intermittent  Location: rectum  Quality: loose  Severity: Mild  Modifying Factors: has not been eating or drinking much  Associated Symptoms: shortness of breath, weakness      Additional History (Context): Sophia Bridges is a 56 y.o. male with diabetes and anxiety who presents with diarrhea for the past 3 days. States has also had SOB and anxiety. Has had the SOB for many months. Was seen here a few weeks ago for the same complaint. Denies fever or n/v. Bettina Villarreal PCP: None    Current Facility-Administered Medications   Medication Dose Route Frequency Provider Last Rate Last Dose    sodium chloride (NS) flush 5-10 mL  5-10 mL IntraVENous PRN BROOKE Campbell         Current Outpatient Prescriptions   Medication Sig Dispense Refill    ALPRAZolam (XANAX) 0.25 mg tablet Take 1 Tab by mouth every eight (8) hours as needed for Anxiety. Max Daily Amount: 0.75 mg. 10 Tab 0    citalopram (CELEXA) 40 mg tablet Take  by mouth daily. Indications: ANXIETY WITH DEPRESSION      modafinil (PROVIGIL) 200 mg tablet Take 200 mg by mouth daily.  nortriptyline (PAMELOR) 75 mg capsule Take 150 mg by mouth nightly. Indications: 150mg at dinner and 75mg at hs      pramipexole (MIRAPEX) 0.125 mg tablet Take 0.125 mg by mouth three (3) times daily. Indications: IDIOPATHIC PARKINSONISM      insulin glargine (LANTUS) 100 unit/mL injection 14 Units by SubCUTAneous route nightly.  insulin aspart (NOVOLOG) 100 unit/mL injection by SubCUTAneous route Before breakfast, lunch, dinner and at bedtime.  Indications: type 1 diabetes mellitus         Past History     Past Medical History:  Past Medical History:   Diagnosis Date    Anxiety     Diabetes ([facility] [address]     Ill-defined condition     hypotension    Psychiatric disorder     depression       Past Surgical History:  History reviewed. No pertinent surgical history. Family History:  History reviewed. No pertinent family history. Social History:  Social History   Substance Use Topics    Smoking status: Former Smoker    Smokeless tobacco: Never Used    Alcohol use No       Allergies:  No Known Allergies      Review of Systems     Constitutional:  Denies malaise, fever, chills. Head:  Denies injury. Face:  Denies injury or pain. ENMT:  Denies sore throat. Neck:  Denies injury or pain. Chest:  Denies injury. Cardiac:  Denies chest pain or palpitations. Respiratory:  difficulty breathing, shortness of breath. Denies cough, wheezing  GI/ABD:  diarrhea. Denies injury, pain, distention, nausea, vomiting,  :  Denies injury, pain, dysuria or urgency. Back:  Denies injury or pain. Pelvis:  Denies injury or pain. Extremity/MS:  Denies injury or pain. Neuro:  Denies headache, LOC, dizziness, neurologic symptoms/deficits/paresthesias. Skin: Denies injury, rash, itching or skin changes. Physical Exam     Visit Vitals    /69    Pulse 87    Temp 98.2 °F (36.8 °C)    Resp 23    Ht 5' 6\" (1.676 m)    Wt 49.9 kg (110 lb)    SpO2 100%    BMI 17.75 kg/m2       CONSTITUTIONAL: Alert, in no apparent distress; well-developed; well-nourished. HEAD:  Normocephalic, atraumatic. EYES: PERRL; EOM's intact. ENTM: Nose: No rhinorrhea; Throat: mucous membranes moist. Posterior pharynx-normal.  Neck:  No JVD, supple without lymphadenopathy. RESP: Chest clear, equal breath sounds. CV: S1 and S2 WNL; No murmurs, gallops or rubs. GI: Abdomen soft and non-tender. No masses or organomegaly. UPPER EXT:  Normal inspection. LOWER EXT: Normal inspection. NEURO: strength 5/5 and sym, sensation intact.    SKIN: No rashes; Normal for age and stage. PSYCH:  Alert and oriented, normal affect. Diagnostic Study Results     Labs -  Recent Results (from the past 12 hour(s))   CBC WITH AUTOMATED DIFF    Collection Time: 02/03/18 10:10 AM   Result Value Ref Range    WBC 25.6 (H) 4.6 - 13.2 K/uL    RBC 4.89 4.70 - 5.50 M/uL    HGB 14.7 13.0 - 16.0 g/dL    HCT 44.4 36.0 - 48.0 %    MCV 90.8 74.0 - 97.0 FL    MCH 30.1 24.0 - 34.0 PG    MCHC 33.1 31.0 - 37.0 g/dL    RDW 15.5 (H) 11.6 - 14.5 %    PLATELET 114 798 - 525 K/uL    MPV 9.8 9.2 - 11.8 FL    NEUTROPHILS 92 (H) 40 - 73 %    LYMPHOCYTES 4 (L) 21 - 52 %    MONOCYTES 4 3 - 10 %    EOSINOPHILS 0 0 - 5 %    BASOPHILS 0 0 - 2 %    ABS. NEUTROPHILS 23.6 (H) 1.8 - 8.0 K/UL    ABS. LYMPHOCYTES 0.9 0.9 - 3.6 K/UL    ABS. MONOCYTES 1.1 0.05 - 1.2 K/UL    ABS. EOSINOPHILS 0.0 0.0 - 0.4 K/UL    ABS. BASOPHILS 0.1 (H) 0.0 - 0.06 K/UL    DF AUTOMATED     METABOLIC PANEL, COMPREHENSIVE    Collection Time: 02/03/18 10:10 AM   Result Value Ref Range    Sodium 148 (H) 136 - 145 mmol/L    Potassium 3.8 3.5 - 5.5 mmol/L    Chloride 107 100 - 108 mmol/L    CO2 29 21 - 32 mmol/L    Anion gap 12 3.0 - 18 mmol/L    Glucose 240 (H) 74 - 99 mg/dL    BUN 26 (H) 7.0 - 18 MG/DL    Creatinine 1.12 0.6 - 1.3 MG/DL    BUN/Creatinine ratio 23 (H) 12 - 20      GFR est AA >60 >60 ml/min/1.73m2    GFR est non-AA >60 >60 ml/min/1.73m2    Calcium 8.9 8.5 - 10.1 MG/DL    Bilirubin, total 0.6 0.2 - 1.0 MG/DL    ALT (SGPT) 12 (L) 16 - 61 U/L    AST (SGOT) 10 (L) 15 - 37 U/L    Alk.  phosphatase 100 45 - 117 U/L    Protein, total 6.2 (L) 6.4 - 8.2 g/dL    Albumin 2.7 (L) 3.4 - 5.0 g/dL    Globulin 3.5 2.0 - 4.0 g/dL    A-G Ratio 0.8 0.8 - 1.7     CARDIAC PANEL,(CK, CKMB & TROPONIN)    Collection Time: 02/03/18 10:10 AM   Result Value Ref Range    CK 42 39 - 308 U/L    CK - MB <1.0 <3.6 ng/ml    CK-MB Index  0.0 - 4.0 %     CALCULATION NOT PERFORMED WHEN RESULT IS BELOW LINEAR LIMIT    Troponin-I, Qt. <0.02 0.0 - 0.045 NG/ML   NT-PRO BNP Collection Time: 02/03/18 10:10 AM   Result Value Ref Range    NT pro- 0 - 900 PG/ML   EKG, 12 LEAD, INITIAL    Collection Time: 02/03/18 10:22 AM   Result Value Ref Range    Ventricular Rate 100 BPM    Atrial Rate 100 BPM    P-R Interval 140 ms    QRS Duration 86 ms    Q-T Interval 418 ms    QTC Calculation (Bezet) 539 ms    Calculated P Axis 78 degrees    Calculated R Axis 69 degrees    Calculated T Axis 73 degrees    Diagnosis       Normal sinus rhythm  Nonspecific ST and T wave abnormality  Prolonged QT  Abnormal ECG  When compared with ECG of 12-JAN-2018 19:03,  Nonspecific T wave abnormality, worse in Lateral leads  QT has lengthened     CULTURE, BLOOD    Collection Time: 02/03/18 10:45 AM   Result Value Ref Range    Special Requests: PERIPHERAL      Culture result: PENDING    CULTURE, BLOOD    Collection Time: 02/03/18 11:00 AM   Result Value Ref Range    Special Requests: PERIPHERAL      Culture result: PENDING    POC LACTIC ACID    Collection Time: 02/03/18 11:04 AM   Result Value Ref Range    Lactic Acid (POC) 1.4 0.4 - 2.0 mmol/L   URINALYSIS W/ RFLX MICROSCOPIC    Collection Time: 02/03/18  1:20 PM   Result Value Ref Range    Color DARK YELLOW      Appearance CLOUDY      Specific gravity >1.030 (H) 1.005 - 1.030    pH (UA) 5.5 5.0 - 8.0      Protein 30 (A) NEG mg/dL    Glucose NEGATIVE  NEG mg/dL    Ketone 40 (A) NEG mg/dL    Bilirubin NEGATIVE  NEG      Blood NEGATIVE  NEG      Urobilinogen 1.0 0.2 - 1.0 EU/dL    Nitrites NEGATIVE  NEG      Leukocyte Esterase SMALL (A) NEG     URINE MICROSCOPIC ONLY    Collection Time: 02/03/18  1:20 PM   Result Value Ref Range    WBC 4 to 10 0 - 4 /hpf    RBC NEGATIVE  0 - 5 /hpf    Epithelial cells 1+ 0 - 5 /lpf    Bacteria 1+ (A) NEG /hpf    Amorphous Crystals 4+ (A) NEG       Radiologic Studies -   CT ABD PELV W CONT   Final Result      XR CHEST PA LAT   Final Result            Medical Decision Making   I am the first provider for this patient.     I reviewed the vital signs, available nursing notes, past medical history, past surgical history, family history and social history. Vital Signs-Reviewed the patient's vital signs. Pulse Oximetry Analysis -  100 on room air (Interpretation)      Records Reviewed: Nursing Notes and Old Medical Records (Time of Review: 10:26 AM)    ED Course: Progress Notes, Reevaluation, and Consults:    3:14 PM  Pt's brother states that while Pt was at [address] for three months for cranial fracture and electro shock treatment, and  C-Diff per his brother. Discharged in December. Provider Notes (Medical Decision Making):   DDx: gastroenteritis, GERD, hernia, hepatitis, pancreatitis, gallbladder etiology, constipation, adhesions, UTI, pyelo, kidney stones, STD, appendicitis, diverticulitis, SBO, GI bleed, mesenteric ischemia, AAA, cardiac etiology, musculoskeletal pain/spasm, malignancy  IMPRESSION AND MEDICAL DECISION MAKING:  Based upon the patient's presentation with noted HPI and PE, along with the work up done in the emergency department, I believe that the patient has infectious diarrhea and Colitis. Spoke with Hospitalist and agrees to admit Pt. For Hospitalized Patients:    1. Hospitalization Decision Time:  The decision to hospitalize the patient was made by Dr. Tomas Chin at [address] [place] [address],  [address] PM   on 2/3/2018        Diagnosis     Clinical Impression:   1.  Diarrhea of presumed infectious origin      _______________________________

## 2018-02-03 NOTE — ED NOTES
Assumed care of patient, report received from [address] [place] [address], patient laying on stretcher with side rails up and call bell in reach. Cardiac monitor placed on patient at this time.

## 2018-02-03 NOTE — IP AVS SNAPSHOT
[address] [address] 
[phone] Patient: Hussein Oro MRN: [id number] :1960 About your hospitalization You were admitted on:  February 3, 2018 You last received care in the:  [address] [place] [address] Road You were discharged on:  2018 Why you were hospitalized Your primary diagnosis was:  C. Difficile Colitis Your diagnoses also included:  Traumatic Brain Injury (Hcc), Iddm (Insulin Dependent Diabetes Mellitus) (Hcc), Parkinson's Disease (Hcc), Psychiatric Disorder Follow-up Information Follow up With Details Comments Contact Info [address]   [address] [id number] [phone] Honey Gamboa MD   [address] [facility] [address] [address] 
[phone] Discharge Orders None A check bharat indicates which time of day the medication should be taken. My Medications START taking these medications Instructions Each Dose to Equal  
 Morning Noon Evening Bedtime  
 vancomycin 50 mg/mL oral solution (compounded) Your last dose was: Your next dose is: Take 2.5 mL by mouth every six (6) hours. 125 mg po qid x 12 days,then 125 mg po bid x 7 days;then 125 mg po daily x 7 days;then 125 mg po every other day x 7 days;then 125 mg po q 3 days x 14 days 125 mg CHANGE how you take these medications Instructions Each Dose to Equal  
 Morning Noon Evening Bedtime  
 citalopram 40 mg tablet Commonly known as:  Krishna Palma What changed:  how much to take Your last dose was: Your next dose is: Take 1 Tab by mouth daily. Indications: ANXIETY WITH DEPRESSION 40 mg CONTINUE taking these medications Instructions Each Dose to Equal  
 Morning Noon Evening Bedtime ALPRAZolam 0.25 mg tablet Commonly known as:  Inell Guevara Your last dose was: Your next dose is: Take 1 Tab by mouth every eight (8) hours as needed for Anxiety. Max Daily Amount: 0.75 mg.  
 0.25 mg  
    
   
   
   
  
 LANTUS 100 unit/mL injection Generic drug:  insulin glargine Your last dose was: Your next dose is:    
   
   
 14 Units by SubCUTAneous route nightly. 14 Units  
    
   
   
   
  
 modafinil 200 mg tablet Commonly known as:  PROVIGIL Your last dose was: Your next dose is: Take 200 mg by mouth daily. 200 mg  
    
   
   
   
  
 nortriptyline 75 mg capsule Commonly known as:  PAMELOR Your last dose was: Your next dose is: Take 150 mg by mouth nightly. Indications: 150mg at dinner and 75mg at hs  
 150 mg NovoLOG 100 unit/mL injection Generic drug:  insulin aspart Your last dose was: Your next dose is:    
   
   
 by SubCUTAneous route Before breakfast, lunch, dinner and at bedtime. Indications: type 1 diabetes mellitus  
     
   
   
   
  
 pramipexole 0.125 mg tablet Commonly known as:  MIRAPEX Your last dose was: Your next dose is: Take 0.125 mg by mouth three (3) times daily. Indications: IDIOPATHIC PARKINSONISM  
 0.125 mg Where to Get Your Medications Information on where to get these meds will be given to you by the nurse or doctor. ! Ask your nurse or doctor about these medications ALPRAZolam 0.25 mg tablet  
 citalopram 40 mg tablet  
 vancomycin 50 mg/mL oral solution (compounded) Discharge Instructions None [facility] Announcement We are excited to announce that we are making your provider's discharge notes available to you in [facility].   You will see these notes when they are completed and signed by the physician that discharged you from your recent hospital stay. If you have any questions or concerns about any information you see in "[employer] please call the Health Information Department where you were seen or reach out to your Primary Care Provider for more information about your plan of care. Introducing [facility]! Ruthann Flores introduces "[employer] patient portal. Now you can access parts of your medical record, email your doctor's office, and request medication refills online. 1. In your internet browser, go to [email or web address] 2. Click on the First Time User? Click Here link in the Sign In box. You will see the New Member Sign Up page. 3. Enter your "[employer] Access Code exactly as it appears below. You will not need to use this code after youve completed the sign-up process. If you do not sign up before the expiration date, you must request a new code. · "[employer] Access Code: [email or web address] Expires: 4/13/2018  2:44 AM 
 
4. Enter the last four digits of your Social Security Number (xxxx) and Date of Birth (mm/dd/yyyy) as indicated and click Submit. You will be taken to the next sign-up page. 5. Create a "[employer] ID. This will be your "[employer] login ID and cannot be changed, so think of one that is secure and easy to remember. 6. Create a "[employer] password. You can change your password at any time. 7. Enter your Password Reset Question and Answer. This can be used at a later time if you forget your password. 8. Enter your e-mail address. You will receive e-mail notification when new information is available in [address] 9. Click Sign Up. You can now view and download portions of your medical record. 10. Click the Download Summary menu link to download a portable copy of your medical information. If you have questions, please visit the Frequently Asked Questions section of the "[employer] website. Remember, "[employer] is NOT to be used for urgent needs. For medical emergencies, dial 911. Now available from your iPhone and Android! Unresulted Labs-Please follow up with your PCP about these lab tests Order Current Status CULTURE, BLOOD Preliminary result CULTURE, BLOOD Preliminary result Providers Seen During Your Hospitalization Provider Specialty Primary office phone Heather Corona MD Emergency Medicine [phone] Ignacio Kumar MD Family Practice [phone] Rafal Hein MD Internal Medicine [phone] Wade Grajeda MD Internal Medicine [phone] Immunizations Administered for This Admission Name Date Influenza Vaccine (Quad) PF  Deferred () Your Primary Care Physician (PCP) Primary Care Physician Office Phone Office Fax [address], [address] [phone] You are allergic to the following No active allergies Recent Documentation Height Weight BMI Smoking Status 1.676 m 56.2 kg 20.01 kg/m2 Former Smoker Emergency Contacts Name Discharge Info Relation Home Work Mobile [facility] DISCHARGE CAREGIVER [3] Brother [24] [phone] Patient Belongings The following personal items are in your possession at time of discharge: 
  Dental Appliances: None  Visual Aid: None      Home Medications: None   Jewelry: None  Clothing: At bedside, Footwear, Socks    Other Valuables: None Please provide this summary of care documentation to your next provider. Signatures-by signing, you are acknowledging that this After Visit Summary has been reviewed with you and you have received a copy. Patient Signature:  ____________________________________________________________ Date:  ____________________________________________________________  
  
[place] Current Provider Signature:  ____________________________________________________________ Date:  ____________________________________________________________

## 2018-02-03 NOTE — ED NOTES
Patient states that he has diarrhea about every 20 minutes, patient given call bell to call out when he has to go to the bathroom and someone will assist him, explained that we can put him on the bedpan or assist him to the bathroom

## 2018-02-03 NOTE — IP AVS SNAPSHOT
[address] [address] 
[phone] Patient: Audrey Monahan MRN: [id number] :1960 A check bharat indicates which time of day the medication should be taken. My Medications START taking these medications Instructions Each Dose to Equal  
 Morning Noon Evening Bedtime  
 vancomycin 50 mg/mL oral solution (compounded) Your last dose was: Your next dose is: Take 2.5 mL by mouth every six (6) hours. 125 mg po qid x 12 days,then 125 mg po bid x 7 days;then 125 mg po daily x 7 days;then 125 mg po every other day x 7 days;then 125 mg po q 3 days x 14 days 125 mg CHANGE how you take these medications Instructions Each Dose to Equal  
 Morning Noon Evening Bedtime  
 citalopram 40 mg tablet Commonly known as:  Jnenifer Bates What changed:  how much to take Your last dose was: Your next dose is: Take 1 Tab by mouth daily. Indications: ANXIETY WITH DEPRESSION 40 mg CONTINUE taking these medications Instructions Each Dose to Equal  
 Morning Noon Evening Bedtime ALPRAZolam 0.25 mg tablet Commonly known as:  Reyes Verdugo Your last dose was: Your next dose is: Take 1 Tab by mouth every eight (8) hours as needed for Anxiety. Max Daily Amount: 0.75 mg.  
 0.25 mg  
    
   
   
   
  
 LANTUS 100 unit/mL injection Generic drug:  insulin glargine Your last dose was: Your next dose is:    
   
   
 14 Units by SubCUTAneous route nightly. 14 Units  
    
   
   
   
  
 modafinil 200 mg tablet Commonly known as:  PROVIGIL Your last dose was: Your next dose is: Take 200 mg by mouth daily. 200 mg  
    
   
   
   
  
 nortriptyline 75 mg capsule Commonly known as:  PAMELOR Your last dose was: Your next dose is: Take 150 mg by mouth nightly. Indications: 150mg at dinner and 75mg at hs  
 150 mg NovoLOG 100 unit/mL injection Generic drug:  insulin aspart Your last dose was: Your next dose is:    
   
   
 by SubCUTAneous route Before breakfast, lunch, dinner and at bedtime. Indications: type 1 diabetes mellitus  
     
   
   
   
  
 pramipexole 0.125 mg tablet Commonly known as:  MIRAPEX Your last dose was: Your next dose is: Take 0.125 mg by mouth three (3) times daily. Indications: IDIOPATHIC PARKINSONISM  
 0.125 mg Where to Get Your Medications Information on where to get these meds will be given to you by the nurse or doctor. ! Ask your nurse or doctor about these medications ALPRAZolam 0.25 mg tablet  
 citalopram 40 mg tablet  
 vancomycin 50 mg/mL oral solution (compounded)

## 2018-02-03 NOTE — ED NOTES
TRANSFER - OUT REPORT:    SBAR summary report on Lorenzo Noriega  being transferred to admitting unit for routine progression of care       Report consisted of patients Situation, Background, Assessment and   Recommendations(SBAR). Information from the following report(s) SBAR, Kardex, ED Summary and MAR was reviewed with the receiving nurse. Opportunity for questions and clarification was provided. Patient transported with:  ED Tech via stretcher. Patient on enteric precautions.

## 2018-02-03 NOTE — ED NOTES
Patient refused oral medications stating \"I cant breath if I drink\". PA consulted and medication order changed. Patient medicated per [facility], allergies verified with patient prior to medication administration.

## 2018-02-03 NOTE — ED NOTES
Called in room, patient stated that he had to use the bedpan, assisted patient on bedpan and patient could not go

## 2018-02-03 NOTE — ED NOTES
Patient medicated per [facility], allergies verified with patient prior to medication administration. Patient able to swallow without incident and drink water that he was given by his family member.

## 2018-02-04 ENCOUNTER — APPOINTMENT (OUTPATIENT)
Dept: GENERAL RADIOLOGY | Age: 58
DRG: 371 | End: 2018-02-04
Attending: HOSPITALIST
Payer: MEDICARE

## 2018-02-04 LAB
ANION GAP SERPL CALC-SCNC: 11 MMOL/L (ref 3–18)
ANION GAP SERPL CALC-SCNC: 16 MMOL/L (ref 3–18)
ARTERIAL PATENCY WRIST A: ABNORMAL
BACTERIA SPEC CULT: ABNORMAL
BASE DEFICIT BLD-SCNC: 3 MMOL/L
BASOPHILS # BLD: 0.1 K/UL (ref 0–0.06)
BASOPHILS # BLD: 0.1 K/UL (ref 0–0.06)
BASOPHILS NFR BLD: 0 % (ref 0–2)
BASOPHILS NFR BLD: 0 % (ref 0–2)
BDY SITE: ABNORMAL
BODY TEMPERATURE: 98.1
BUN SERPL-MCNC: 12 MG/DL (ref 7–18)
BUN SERPL-MCNC: 17 MG/DL (ref 7–18)
BUN/CREAT SERPL: 18 (ref 12–20)
BUN/CREAT SERPL: 23 (ref 12–20)
CALCIUM SERPL-MCNC: 7.7 MG/DL (ref 8.5–10.1)
CALCIUM SERPL-MCNC: 7.8 MG/DL (ref 8.5–10.1)
CHLORIDE SERPL-SCNC: 103 MMOL/L (ref 100–108)
CHLORIDE SERPL-SCNC: 106 MMOL/L (ref 100–108)
CK MB CFR SERPL CALC: NORMAL % (ref 0–4)
CK MB SERPL-MCNC: <1 NG/ML (ref 5–25)
CK SERPL-CCNC: 46 U/L (ref 39–308)
CO2 SERPL-SCNC: 20 MMOL/L (ref 21–32)
CO2 SERPL-SCNC: 26 MMOL/L (ref 21–32)
CREAT SERPL-MCNC: 0.67 MG/DL (ref 0.6–1.3)
CREAT SERPL-MCNC: 0.73 MG/DL (ref 0.6–1.3)
DIFFERENTIAL METHOD BLD: ABNORMAL
DIFFERENTIAL METHOD BLD: ABNORMAL
EOSINOPHIL # BLD: 0.1 K/UL (ref 0–0.4)
EOSINOPHIL # BLD: 0.3 K/UL (ref 0–0.4)
EOSINOPHIL NFR BLD: 1 % (ref 0–5)
EOSINOPHIL NFR BLD: 2 % (ref 0–5)
ERYTHROCYTE [DISTWIDTH] IN BLOOD BY AUTOMATED COUNT: 15.2 % (ref 11.6–14.5)
ERYTHROCYTE [DISTWIDTH] IN BLOOD BY AUTOMATED COUNT: 15.6 % (ref 11.6–14.5)
GAS FLOW.O2 O2 DELIVERY SYS: ABNORMAL L/MIN
GAS FLOW.O2 SETTING OXYMISER: 12 L/M
GLUCOSE BLD STRIP.AUTO-MCNC: 129 MG/DL (ref 70–110)
GLUCOSE BLD STRIP.AUTO-MCNC: 146 MG/DL (ref 70–110)
GLUCOSE BLD STRIP.AUTO-MCNC: 156 MG/DL (ref 70–110)
GLUCOSE BLD STRIP.AUTO-MCNC: 191 MG/DL (ref 70–110)
GLUCOSE BLD STRIP.AUTO-MCNC: 216 MG/DL (ref 70–110)
GLUCOSE BLD STRIP.AUTO-MCNC: 248 MG/DL (ref 70–110)
GLUCOSE BLD STRIP.AUTO-MCNC: 32 MG/DL (ref 70–110)
GLUCOSE BLD STRIP.AUTO-MCNC: 65 MG/DL (ref 70–110)
GLUCOSE SERPL-MCNC: 192 MG/DL (ref 74–99)
GLUCOSE SERPL-MCNC: 67 MG/DL (ref 74–99)
HCO3 BLD-SCNC: 18.6 MMOL/L (ref 22–26)
HCT VFR BLD AUTO: 35.7 % (ref 36–48)
HCT VFR BLD AUTO: 36.2 % (ref 36–48)
HGB BLD-MCNC: 11.5 G/DL (ref 13–16)
HGB BLD-MCNC: 11.7 G/DL (ref 13–16)
LYMPHOCYTES # BLD: 1.5 K/UL (ref 0.9–3.6)
LYMPHOCYTES # BLD: 2.6 K/UL (ref 0.9–3.6)
LYMPHOCYTES NFR BLD: 16 % (ref 21–52)
LYMPHOCYTES NFR BLD: 8 % (ref 21–52)
MCH RBC QN AUTO: 29.4 PG (ref 24–34)
MCH RBC QN AUTO: 29.5 PG (ref 24–34)
MCHC RBC AUTO-ENTMCNC: 32.2 G/DL (ref 31–37)
MCHC RBC AUTO-ENTMCNC: 32.3 G/DL (ref 31–37)
MCV RBC AUTO: 91 FL (ref 74–97)
MCV RBC AUTO: 91.5 FL (ref 74–97)
MONOCYTES # BLD: 1 K/UL (ref 0.05–1.2)
MONOCYTES # BLD: 1.1 K/UL (ref 0.05–1.2)
MONOCYTES NFR BLD: 6 % (ref 3–10)
MONOCYTES NFR BLD: 6 % (ref 3–10)
NEUTS SEG # BLD: 12.1 K/UL (ref 1.8–8)
NEUTS SEG # BLD: 16.6 K/UL (ref 1.8–8)
NEUTS SEG NFR BLD: 76 % (ref 40–73)
NEUTS SEG NFR BLD: 85 % (ref 40–73)
O2/TOTAL GAS SETTING VFR VENT: 100 %
PCO2 BLD: 20.1 MMHG (ref 35–45)
PH BLD: 7.57 [PH] (ref 7.35–7.45)
PLATELET # BLD AUTO: 269 K/UL (ref 135–420)
PLATELET # BLD AUTO: 284 K/UL (ref 135–420)
PMV BLD AUTO: 10 FL (ref 9.2–11.8)
PMV BLD AUTO: 10 FL (ref 9.2–11.8)
PO2 BLD: 344 MMHG (ref 80–100)
POTASSIUM SERPL-SCNC: 3.1 MMOL/L (ref 3.5–5.5)
POTASSIUM SERPL-SCNC: 3.9 MMOL/L (ref 3.5–5.5)
RBC # BLD AUTO: 3.9 M/UL (ref 4.7–5.5)
RBC # BLD AUTO: 3.98 M/UL (ref 4.7–5.5)
SAO2 % BLD: 100 % (ref 92–97)
SERVICE CMNT-IMP: ABNORMAL
SERVICE CMNT-IMP: ABNORMAL
SODIUM SERPL-SCNC: 139 MMOL/L (ref 136–145)
SODIUM SERPL-SCNC: 143 MMOL/L (ref 136–145)
SPECIMEN TYPE: ABNORMAL
TOTAL RESP. RATE, ITRR: 24
TROPONIN I SERPL-MCNC: <0.02 NG/ML (ref 0–0.04)
WBC # BLD AUTO: 15.9 K/UL (ref 4.6–13.2)
WBC # BLD AUTO: 19.3 K/UL (ref 4.6–13.2)

## 2018-02-04 PROCEDURE — 74011000250 HC RX REV CODE- 250: Performed by: HOSPITALIST

## 2018-02-04 PROCEDURE — 77030011256 HC DRSG MEPILEX <16IN NO BORD MOLN -A

## 2018-02-04 PROCEDURE — 85025 COMPLETE CBC W/AUTO DIFF WBC: CPT | Performed by: HOSPITALIST

## 2018-02-04 PROCEDURE — 36600 WITHDRAWAL OF ARTERIAL BLOOD: CPT

## 2018-02-04 PROCEDURE — 65610000006 HC RM INTENSIVE CARE

## 2018-02-04 PROCEDURE — 74011250636 HC RX REV CODE- 250/636: Performed by: HOSPITALIST

## 2018-02-04 PROCEDURE — 82803 BLOOD GASES ANY COMBINATION: CPT

## 2018-02-04 PROCEDURE — 94760 N-INVAS EAR/PLS OXIMETRY 1: CPT

## 2018-02-04 PROCEDURE — 82553 CREATINE MB FRACTION: CPT | Performed by: HOSPITALIST

## 2018-02-04 PROCEDURE — 80048 BASIC METABOLIC PNL TOTAL CA: CPT | Performed by: HOSPITALIST

## 2018-02-04 PROCEDURE — 77030020186 HC BOOT HL PROTCT SAGE -B

## 2018-02-04 PROCEDURE — 77030033263 HC DRSG MEPILEX 16-48IN BORD MOLN -B

## 2018-02-04 PROCEDURE — 74011636637 HC RX REV CODE- 636/637: Performed by: HOSPITALIST

## 2018-02-04 PROCEDURE — 82962 GLUCOSE BLOOD TEST: CPT

## 2018-02-04 PROCEDURE — 36415 COLL VENOUS BLD VENIPUNCTURE: CPT | Performed by: HOSPITALIST

## 2018-02-04 PROCEDURE — 74011250636 HC RX REV CODE- 250/636: Performed by: FAMILY MEDICINE

## 2018-02-04 PROCEDURE — 77010033678 HC OXYGEN DAILY

## 2018-02-04 PROCEDURE — 74011250637 HC RX REV CODE- 250/637: Performed by: HOSPITALIST

## 2018-02-04 PROCEDURE — 94660 CPAP INITIATION&MGMT: CPT

## 2018-02-04 PROCEDURE — 71045 X-RAY EXAM CHEST 1 VIEW: CPT

## 2018-02-04 PROCEDURE — 77030020256 HC SOL INJ NACL 0.9%  500ML

## 2018-02-04 PROCEDURE — 94640 AIRWAY INHALATION TREATMENT: CPT

## 2018-02-04 RX ORDER — IPRATROPIUM BROMIDE AND ALBUTEROL SULFATE 2.5; .5 MG/3ML; MG/3ML
3 SOLUTION RESPIRATORY (INHALATION)
Status: DISCONTINUED | OUTPATIENT
Start: 2018-02-04 | End: 2018-02-06

## 2018-02-04 RX ORDER — SODIUM CHLORIDE 9 MG/ML
500 INJECTION, SOLUTION INTRAVENOUS ONCE
Status: COMPLETED | OUTPATIENT
Start: 2018-02-04 | End: 2018-02-05

## 2018-02-04 RX ORDER — IPRATROPIUM BROMIDE AND ALBUTEROL SULFATE 2.5; .5 MG/3ML; MG/3ML
3 SOLUTION RESPIRATORY (INHALATION)
Status: DISCONTINUED | OUTPATIENT
Start: 2018-02-04 | End: 2018-02-04

## 2018-02-04 RX ORDER — LORAZEPAM 2 MG/ML
1 INJECTION INTRAMUSCULAR
Status: DISCONTINUED | OUTPATIENT
Start: 2018-02-04 | End: 2018-02-08 | Stop reason: HOSPADM

## 2018-02-04 RX ORDER — DEXTROSE, SODIUM CHLORIDE, AND POTASSIUM CHLORIDE 5; .45; .15 G/100ML; G/100ML; G/100ML
125 INJECTION INTRAVENOUS CONTINUOUS
Status: DISCONTINUED | OUTPATIENT
Start: 2018-02-04 | End: 2018-02-05

## 2018-02-04 RX ADMIN — DEXTROSE MONOHYDRATE, SODIUM CHLORIDE, AND POTASSIUM CHLORIDE 125 ML/HR: 50; 4.5; 1.49 INJECTION, SOLUTION INTRAVENOUS at 19:39

## 2018-02-04 RX ADMIN — DEXTROSE MONOHYDRATE 12.5 G: 25 INJECTION, SOLUTION INTRAVENOUS at 07:15

## 2018-02-04 RX ADMIN — VANCOMYCIN HYDROCHLORIDE 125 MG: 1 INJECTION, POWDER, LYOPHILIZED, FOR SOLUTION INTRAVENOUS at 14:15

## 2018-02-04 RX ADMIN — INSULIN LISPRO 4 UNITS: 100 INJECTION, SOLUTION INTRAVENOUS; SUBCUTANEOUS at 19:26

## 2018-02-04 RX ADMIN — ENOXAPARIN SODIUM 40 MG: 40 INJECTION SUBCUTANEOUS at 19:32

## 2018-02-04 RX ADMIN — DEXTROSE MONOHYDRATE, SODIUM CHLORIDE, AND POTASSIUM CHLORIDE 125 ML/HR: 50; 4.5; 1.49 INJECTION, SOLUTION INTRAVENOUS at 11:05

## 2018-02-04 RX ADMIN — PRAMIPEXOLE DIHYDROCHLORIDE 0.12 MG: 0.25 TABLET ORAL at 10:47

## 2018-02-04 RX ADMIN — INSULIN LISPRO 4 UNITS: 100 INJECTION, SOLUTION INTRAVENOUS; SUBCUTANEOUS at 22:12

## 2018-02-04 RX ADMIN — MODAFINIL 200 MG: 100 TABLET ORAL at 14:16

## 2018-02-04 RX ADMIN — LORAZEPAM 1 MG: 2 INJECTION INTRAMUSCULAR; INTRAVENOUS at 23:11

## 2018-02-04 RX ADMIN — IPRATROPIUM BROMIDE AND ALBUTEROL SULFATE 3 ML: .5; 3 SOLUTION RESPIRATORY (INHALATION) at 18:06

## 2018-02-04 RX ADMIN — SODIUM CHLORIDE AND POTASSIUM CHLORIDE: 4.5; 1.49 INJECTION, SOLUTION INTRAVENOUS at 05:28

## 2018-02-04 RX ADMIN — SODIUM CHLORIDE 500 ML: 900 INJECTION, SOLUTION INTRAVENOUS at 23:11

## 2018-02-04 RX ADMIN — NORTRIPTYLINE HYDROCHLORIDE 150 MG: 25 CAPSULE ORAL at 19:29

## 2018-02-04 RX ADMIN — CITALOPRAM HYDROBROMIDE 40 MG: 20 TABLET ORAL at 10:47

## 2018-02-04 RX ADMIN — VANCOMYCIN HYDROCHLORIDE 125 MG: 1 INJECTION, POWDER, LYOPHILIZED, FOR SOLUTION INTRAVENOUS at 05:28

## 2018-02-04 RX ADMIN — VANCOMYCIN HYDROCHLORIDE 125 MG: 1 INJECTION, POWDER, LYOPHILIZED, FOR SOLUTION INTRAVENOUS at 19:28

## 2018-02-04 RX ADMIN — PRAMIPEXOLE DIHYDROCHLORIDE 0.12 MG: 0.25 TABLET ORAL at 19:28

## 2018-02-04 RX ADMIN — ALPRAZOLAM 0.25 MG: 0.5 TABLET ORAL at 14:16

## 2018-02-04 RX ADMIN — GLUCAGON HYDROCHLORIDE 1 MG: KIT at 06:25

## 2018-02-04 NOTE — PROGRESS NOTES
[facility]   Discharge Planning/ Assessment      Reasons for Intervention:  Interviewed patient, he agrees to share his discharge information with his brother, see below. Demographic information verified. [facility] had traumatic brain injury and had been discharged home from [facility] to live with his brother in December. He states he has seen Dr Mojgan Ceron twice since he began living with his brother.   He is C/O inability to swallow.   His discharge plan is to return home with home care as indicated.        High Risk Criteria  [x] Yes                []No   Physician Referral  [] Yes                [x]No        Date      Nursing Referral  [] Yes                [x]No        Date      Patient/Family Request  [] Yes                [x]No        Date          Resources:      Medicare  [x] Yes                []No   Medicaid  [x] Yes                []No CCCP   No Resources  [] Yes                [x]No   Private Insurance  [] Yes                [x]No     Name/Phone Number      Other  [] Yes                [x]No        (i.e. Workman's Comp)          Prior Services:      Prior Services [facility]  [] Yes                [x]No        Number of [place]  [] Yes                [x]No         Meals on [facility] [address] [facility]  [] Yes                [x]No   DIO[address] [facility]  [] Yes                [x]No        Rehab/VA Name      Other [address] obtained from [address]  [] Child  [] Spouse   [] Significant Other/Partner   [] Friend      [] EMS    [] Nursing Home Chart          [x] Other: chart   Chart Review  [x] Yes                []No     Family/Support System:      Patient lives with  [] Alone    [] Spouse   [] Significant Other  [] Children  [] Caretaker   [] Parent  [x] Sibling     [] Other        Other Support System:      Is the patient responsible for care of others  [] Yes                [x]No   Information of person caring for patient on  discharge self   Managers financial affairs independently  [x] Yes                []No   If no, explain:          Status Prior to Admission:      Mental Status  [x] Awake  [x] Alert  [] Oriented  [] Quiet/Calm [] Lethargic/Sedated   [] Disoriented  [] Restless/Anxious  [] Combative   Personal Care  [] Dependent  [x] Independent Personal Care  [] Requires Assistance   Meal Preparation Ability  [x] Independent   [] Standby Assistance   [] Minimal Assistance   [] Moderate Assistance  [] Maximum Assistance     [] Total Assistance   Chores  [x] Independent with Yoli Dan Resident   [] Requires Assistance   Bowel/Bladder  [x] Continent  [] Catheter  [] Incontinent  [] Ostomy Self-Care    [] Urine Diversion Self-Care  [] Maximum Assistance     [] Total Assistance   Number of Persons needed for assistance      DME at home  [] Cane, Quad  [] Cane, Straight   [] Commode    [] Bathroom/Grab Bars  [] Hospital Bed  [] Nebulizer  [] Oxygen           [] Raised Toilet Seat  [] Shower Chair  [] Side Rails for Bed   [] Tub Transfer Bench   [] Walker, Rolling  [] Walker, Standard      [] Other:   Vendor          Treatment Presently Receiving:      Current Treatments  [] Chemotherapy  [] Dialysis  [] Insulin  [] IVAB [x] IVF   [x] O2  [] PCA   [] PT   [] RT   [] Tube Feedings   [] Wound Care       Psychosocial Evaluation:      Verbalized Knowledge of Disease Process  [x] Patient                       [x]Family   Coping with Disease Process  [x] Patient                       [x]Family   Requires Further Counseling Coping with Disease Process  [] Patient                       []Family       Identified Projected Needs:  [facility] [address] [facility]  [] Yes                [x]No   Transportation  [] Yes                [x]No   Education  [] Yes                [x]No        Specific Education       Financial Counseling  [] Yes                [x]No   Inability to Care for Self/Will Require 24 hour care  [] Yes                [x]No   Pain Management  [] Yes                [x]No   Home Infusion Therapy  [] Yes                [x]No   Oxygen Therapy  [] Yes                [x]No   DME  [] Yes                [x]No   [address] Placement  [] Yes                [x]No   Rehab  [] Yes                [x]No   Physical Therapy  [x] Yes                []No   Needs Anticipated At This Time  [x] Yes                []No       Intra-Hospital Referral:  [address]  [x] Yes                []No     [] Yes                [x]No   Patient Representative  [] Yes                [x]No   Staff for Teaching Needs  [] Yes                [x]No   Specialty Teaching Needs      Diabetic Educator  [] Yes                [x]No   Referral for Diabetic Educator Needed  [] Yes                [x]No  If Yes, place order for Nutritionist or Diabetic Consult       Tentative Discharge Plan:  [address] with No Services  [] Yes                [x]No   Home with [address]  [x] Yes                []No        If Yes, specify type Nursing./therapy   Home Care Program  [] Yes                [x]No        If Yes, specify type      Meals on Wheels  [] Yes                [x]No   Office of Aging  [] Yes                [x]No   NHP  [] Yes                [x]No   Return to the Nursing Home  [] Yes                [x]No   Rehab Therapy  [] Yes                [x]No   Acute Rehab  [] Yes                [x]No   Subacute Rehab  [] Yes                [x]No   Private Care  [] Yes                [x]No   Substance Abuse Referral  [] Yes                [x]No   Transportation  [] Yes                [x]No   Chore Service  [] Yes                [x]No   Inpatient Hospice  [] Yes                [x]No   OP RT  [] Yes                [x] No   OP Hemo  [] Yes                [x] No   OP PT  [] Yes                [x]No   Support Group  [] Yes                [x]No   Reach to Recovery  [] Yes                [x]No   OP Oncology Clinic  [] Yes                [x]No   Clinic Appointment  [] Yes                [x]No   DME  [x] Yes                []No   Comments      Name of D/C Planner or  Given to Patient or Family Daniel Evangelista RN   Phone Number [phone] [facility]   Date Feb 4,2018   Time 656 am   If you are discharged home, whom do you designate to participate in your discharge plan and receive any information needed?       Enter name of Dawit Layne  brother        Phone # of [address] of [address] [place] [address]. Braulio White 31        Updated Feb 4, 2018        Patient refused to designate any           individual

## 2018-02-04 NOTE — H&P
History and Physical    Patient: Mendez Robin               Sex: male          DOA: 2/3/2018       YOB: 1960      Age:  56 y.o.        LOS:  LOS: 0 days        HPI:     Mendez Robin is a 56 y.o. male who presented to the ER with recurrent diarrhea. This has been going on for three days at home. There is no vomiting. There is no fever. There has been no blood in the stool. There has been an extremely large amount of stool at home. It has been foul smelling. The patient was hospitalized at [facility] for traumatic brain injury and had been discharged home to live with his brother in December. Clinically it appears the patient has potential C diff and will be admitted for ongoing management. Past Medical History:   Diagnosis Date    Anxiety     Diabetes ([address]     Ill-defined condition     hypotension    Psychiatric disorder     depression       Social History:   Tobacco use:  Patient does not smoke   Alcohol use:  Patient does not use alcohol   Patient lives with his brother after traumatic brain injury    Family History: Mother had anxiety   Father was reportedly healthy    Review of Systems    Constitutional:  No fever or weight loss  HEENT:  No headache or visual changes  Cardiovascular:  No chest pain or diaphoresis  Respiratory:  No coughing, wheezing, or shortness of breath. GI:  Abdominal pain and diarrhea as above. No nausea or vomiting.   :  No hematuria or dysuria  Skin:  No rashes or moles  Neuro:  No seizures or syncope  Hematological:  No bruising or bleeding  Endocrine:  No diabetes or thyroid disease    Physical Exam:      Visit Vitals    BP 99/61 (BP 1 Location: Left arm, BP Patient Position: Head of bed elevated (Comment degrees))    Pulse 88    Temp 98.5 °F (36.9 °C)    Resp 20    Ht 5' 6\" (1.676 m)    Wt 49.9 kg (110 lb)    SpO2 99%    BMI 17.75 kg/m2       Physical Exam:    Gen:  No distress, alert  HEENT:  Normal cephalic atraumatic, extra-occular movements are intact. Neck:  Supple, No JVD  Lungs:  Clear bilaterally, no wheeze, no rales, normal effort  Heart:  Regular Rate and Rhythm, normal S1 and S2, no edema  Abdomen:  Soft, non tender, normal bowel sounds, no guarding.   Extremities:  Well perfused, no cyanosis or edema  Neurological:  Awake and alert, CN's are intact, normal strength throughout extremities  Skin:  No rashes or moles  Mental Status:  Normal thought process, does not appear anxious    Laboratory Studies:    BMP:   Lab Results   Component Value Date/Time     02/04/2018 06:48 AM    K 3.1 (L) 02/04/2018 06:48 AM     02/04/2018 06:48 AM    CO2 26 02/04/2018 06:48 AM    AGAP 11 02/04/2018 06:48 AM    GLU 67 (L) 02/04/2018 06:48 AM    BUN 17 02/04/2018 06:48 AM    CREA 0.73 02/04/2018 06:48 AM    GFRAA >60 02/04/2018 06:48 AM    GFRNA >60 02/04/2018 06:48 AM     CBC:   Lab Results   Component Value Date/Time    WBC 19.3 (H) 02/04/2018 06:48 AM    HGB 11.5 (L) 02/04/2018 06:48 AM    HCT 35.7 (L) 02/04/2018 06:48 AM     02/04/2018 06:48 AM       Assessment/Plan     Principal Problem:    C. difficile colitis (2/3/2018)    Active Problems:    IDDM (insulin dependent diabetes mellitus) ([facility] (2/3/2018)      Traumatic brain injury ([facility] (2/3/2018)      Psychiatric disorder (2/3/2018)      Parkinson's disease ([facility] (2/3/2018)        PLAN:    Will treat with oral vancomycin and follow status  IV fluids  BS control  PT eval and treat  DVT prophylaxis  Discussed with brother and patient at the bedside

## 2018-02-04 NOTE — PROGRESS NOTES
1950: Bedside and Verbal shift change report given to Shirley Meyers RN (oncoming nurse) by Frank Escamilla RN (offgoing nurse). Report included the following information SBAR, Kardex and ED Summary. Assumed care of patient. Patient resting quietly in bed watching TV. No complains and/or concerns at this time. Bed in low position, wheels locked, call bell and phone within reach. Visit Vitals    BP 99/61 (BP 1 Location: Left arm, BP Patient Position: Head of bed elevated (Comment degrees))    Pulse 88    Temp 98.5 °F (36.9 °C)    Resp 20    Ht 5' 6\" (1.676 m)    Wt 49.9 kg (110 lb)    SpO2 99%    BMI 17.75 kg/m2     2209: Patient hypoglycemic, blood glucose 39 mg/dL, asymptomatic. No D50W on unit. Patient given glucagon, blood glucose recheck 75 mg/dL. Gave soda, repeat blood glucose 129 mg/dL. Patient's brother at bedside. Answered all questions and/or concerns. Bed in low position, wheel locked, call bell and phone within reach. Patient left with no distress noted. 2245: Hospitalist on-call notified of hypoglycemia. Received telephone order to continue to follow the hypoglycemic protocol. 2473: Patient hypoglycemic, blood glucose 32 mg/dL, asymptomatic. Gave glucagon, blood glucose recheck 65 mg/dL. Nursing supervisor contact to get D50W, pharmacy currently closed. Will get D50W from pharmacy when they open at 0700 and administer per [facility].    0715: Patient medicated per [facility]    3221: Blood glucose recheck, 156 mg/dL. 0800: Bedside and Verbal shift change report given to Katherin Jaeger RN (oncoming nurse) by Shirley Meyers RN (offgoing nurse). Report included the following information SBAR, Kardex, ED Summary, Intake/Output, MAR and Recent Results .      Patient Vitals for the past 12 hrs:   Temp Pulse Resp BP SpO2   02/04/18 0608 98.6 °F (37 °C) 81 18 95/58 100 %   02/04/18 0011 98.7 °F (37.1 °C) 85 18 90/53 100 %

## 2018-02-04 NOTE — ROUTINE PROCESS
7444-Received patient from ED, oriented to room, no distress noted, voiced no complaints at this time. Aid in to do vital signs.

## 2018-02-04 NOTE — PROGRESS NOTES
0730 Report received from [facility]. Patient received in bed awake and alert x 4, noted with mild confusion and noticeable tick, patient is able to make all his needs known at this time. Patient is continent of bowel and bladder and uses the bed pan with assistance. Patient is up with assistance x 1. Patient uses zero devices to ambulate. Patient is noted on room air. Patient is noted with redness to bilateral heels, elbows and sacral. Mepilex pads applied. Plans are to discharge back home when medically stable. Patient currently c/o zero pain and is noted with zero s/s of distress. Patient was noted with SOB, stating he can't breath. O2 sat were noted at 78, he continued to drop to 68, oxygen increased to 4 Liters nasal cannula. Patient increased to 83 he was symptomatic, gasping for air, Respiratory was called, O2 sats continued at approximately 82. Rapid response called with new orders for ABG, Xray, Cardiac enzymes, CBC, BMP, Blood sugar was noted at 216, with new orders to start a sliding scale insulin, patient remains on D5 1/2 NS with 20 mEq of K+. Patient placed on a non rebreather mask, he returned to 100 % and maintained this for approximately 30 minutes. Nasal cannula returned on 4 Liters. Patient again showed s/s of respiratory distress. MD Nicole Joyce notified with new orders to Transport patient to the ICU with a BIPAP. Bedside and Verbal shift change report given to Rosalina Hunter (oncoming nurse) by Sarah Gomez RN (offgoing nurse). Report included the following information SBAR, ED Summary, Procedure Summary, Intake/Output, MAR, Recent Results, Med Rec Status and Cardiac Rhythm NSR.

## 2018-02-04 NOTE — PROGRESS NOTES
Problem: Falls - Risk of  Goal: *Absence of Falls  Document Ernie Fall Risk and appropriate interventions in the flowsheet.    Outcome: Progressing Towards Goal  Fall Risk Interventions:            Medication Interventions: Patient to call before getting OOB    Elimination Interventions: Call light in reach

## 2018-02-04 NOTE — PROGRESS NOTES
Patient has designated his brotherto participate in his/her discharge plan and to receive any needed information.      Name:   Gricelda Nelson  brother   [address], [address] Feb 4, 2018     Address:  Phone number:

## 2018-02-04 NOTE — ROUTINE PROCESS
1717:  Responded to RRT in patient room. HARINI MELCHOR, primary RN and RT present. Pt is in NRB with O2 sats 93-95%. O2 sats reported to be 78% prior to initiation of O2 therapy. Pt is not in apparent distress. SBP noted to be in 80's. Per primary RN, pt receiving increased IVF for treatment. Orders received for STAT ABG, CXR, and labs. Will continue to monitor with primary RN.

## 2018-02-04 NOTE — PROGRESS NOTES
Patient admitted for what will likely end up being C diff colitis  He spent a long time at [facility] after a traumatic brain injury and was discharged in December to live with his brother. He has brittle diabetes  H+P Forthcoming.

## 2018-02-04 NOTE — PROGRESS NOTES
Patient express that he has trouble with swallowing when he eats. I told the RN Loretta Morse, and ask if he could get a swallow consult.  So he hasn't eat or drink anything today

## 2018-02-04 NOTE — PROGRESS NOTES
Progress Note      Patient: Kierra Ritter               Sex: male          DOA: 2/3/2018       YOB: 1960      Age:  56 y.o.        LOS:  LOS: 1 day             CHIEF COMPLAINT:  C diff colitis    Subjective:     Patient continues with copious diarrhea  WBC count is decreasing    Objective:      Visit Vitals    BP 95/58 (BP 1 Location: Left arm, BP Patient Position: Head of bed elevated (Comment degrees))    Pulse 81    Temp 98.6 °F (37 °C)    Resp 18    Ht 5' 6\" (1.676 m)    Wt 49.9 kg (110 lb)    SpO2 100%    BMI 17.75 kg/m2       Physical Exam:  Gen:  No distress, no complaint  Lungs:  Clear bilaterally, no wheeze or rhonchi  Heart:  Regular rate and rhythm, no murmurs or gallops  Abdomen:  Soft, non-tender, normal bowel sounds        Lab/Data Reviewed:  BMP:   Lab Results   Component Value Date/Time     02/04/2018 06:48 AM    K 3.1 (L) 02/04/2018 06:48 AM     02/04/2018 06:48 AM    CO2 26 02/04/2018 06:48 AM    AGAP 11 02/04/2018 06:48 AM    GLU 67 (L) 02/04/2018 06:48 AM    BUN 17 02/04/2018 06:48 AM    CREA 0.73 02/04/2018 06:48 AM    GFRAA >60 02/04/2018 06:48 AM    GFRNA >60 02/04/2018 06:48 AM     CBC:   Lab Results   Component Value Date/Time    WBC 19.3 (H) 02/04/2018 06:48 AM    HGB 11.5 (L) 02/04/2018 06:48 AM    HCT 35.7 (L) 02/04/2018 06:48 AM     02/04/2018 06:48 AM           Assessment/Plan     Principal Problem:    C. difficile colitis (2/3/2018)    Active Problems:    IDDM (insulin dependent diabetes mellitus) ([facility] (2/3/2018)      Traumatic brain injury ([facility] (2/3/2018)      Psychiatric disorder (2/3/2018)      Parkinson's disease ([facility] (2/3/2018)        Plan:  Continue with oral vancomycin  BS control  He is having trouble with diet, will change IVF to add dextrose  DVT prophylaxis

## 2018-02-05 LAB
ANION GAP SERPL CALC-SCNC: 15 MMOL/L (ref 3–18)
BACTERIA SPEC CULT: NORMAL
BASOPHILS # BLD: 0.1 K/UL (ref 0–0.06)
BASOPHILS NFR BLD: 1 % (ref 0–2)
BUN SERPL-MCNC: 9 MG/DL (ref 7–18)
BUN/CREAT SERPL: 15 (ref 12–20)
CALCIUM SERPL-MCNC: 7.3 MG/DL (ref 8.5–10.1)
CHLORIDE SERPL-SCNC: 107 MMOL/L (ref 100–108)
CO2 SERPL-SCNC: 20 MMOL/L (ref 21–32)
CREAT SERPL-MCNC: 0.61 MG/DL (ref 0.6–1.3)
DIFFERENTIAL METHOD BLD: ABNORMAL
EOSINOPHIL # BLD: 0.2 K/UL (ref 0–0.4)
EOSINOPHIL NFR BLD: 2 % (ref 0–5)
ERYTHROCYTE [DISTWIDTH] IN BLOOD BY AUTOMATED COUNT: 15.3 % (ref 11.6–14.5)
GLUCOSE BLD STRIP.AUTO-MCNC: 174 MG/DL (ref 70–110)
GLUCOSE BLD STRIP.AUTO-MCNC: 198 MG/DL (ref 70–110)
GLUCOSE BLD STRIP.AUTO-MCNC: 266 MG/DL (ref 70–110)
GLUCOSE BLD STRIP.AUTO-MCNC: 304 MG/DL (ref 70–110)
GLUCOSE BLD STRIP.AUTO-MCNC: 346 MG/DL (ref 70–110)
GLUCOSE SERPL-MCNC: 256 MG/DL (ref 74–99)
HCT VFR BLD AUTO: 34 % (ref 36–48)
HGB BLD-MCNC: 11 G/DL (ref 13–16)
LYMPHOCYTES # BLD: 2 K/UL (ref 0.9–3.6)
LYMPHOCYTES NFR BLD: 20 % (ref 21–52)
MCH RBC QN AUTO: 29.3 PG (ref 24–34)
MCHC RBC AUTO-ENTMCNC: 32.4 G/DL (ref 31–37)
MCV RBC AUTO: 90.7 FL (ref 74–97)
MONOCYTES # BLD: 0.6 K/UL (ref 0.05–1.2)
MONOCYTES NFR BLD: 6 % (ref 3–10)
NEUTS SEG # BLD: 7.4 K/UL (ref 1.8–8)
NEUTS SEG NFR BLD: 71 % (ref 40–73)
PLATELET # BLD AUTO: 266 K/UL (ref 135–420)
PMV BLD AUTO: 10.1 FL (ref 9.2–11.8)
POTASSIUM SERPL-SCNC: 3.6 MMOL/L (ref 3.5–5.5)
RBC # BLD AUTO: 3.75 M/UL (ref 4.7–5.5)
SERVICE CMNT-IMP: NORMAL
SODIUM SERPL-SCNC: 142 MMOL/L (ref 136–145)
WBC # BLD AUTO: 10.3 K/UL (ref 4.6–13.2)

## 2018-02-05 PROCEDURE — 92526 ORAL FUNCTION THERAPY: CPT

## 2018-02-05 PROCEDURE — 94640 AIRWAY INHALATION TREATMENT: CPT

## 2018-02-05 PROCEDURE — 65660000000 HC RM CCU STEPDOWN

## 2018-02-05 PROCEDURE — 74011250636 HC RX REV CODE- 250/636: Performed by: HOSPITALIST

## 2018-02-05 PROCEDURE — 80048 BASIC METABOLIC PNL TOTAL CA: CPT | Performed by: HOSPITALIST

## 2018-02-05 PROCEDURE — 92610 EVALUATE SWALLOWING FUNCTION: CPT

## 2018-02-05 PROCEDURE — 94762 N-INVAS EAR/PLS OXIMTRY CONT: CPT

## 2018-02-05 PROCEDURE — 74011000250 HC RX REV CODE- 250: Performed by: HOSPITALIST

## 2018-02-05 PROCEDURE — 74011250636 HC RX REV CODE- 250/636: Performed by: FAMILY MEDICINE

## 2018-02-05 PROCEDURE — 36415 COLL VENOUS BLD VENIPUNCTURE: CPT | Performed by: HOSPITALIST

## 2018-02-05 PROCEDURE — 74011636637 HC RX REV CODE- 636/637: Performed by: HOSPITALIST

## 2018-02-05 PROCEDURE — 94660 CPAP INITIATION&MGMT: CPT

## 2018-02-05 PROCEDURE — 85025 COMPLETE CBC W/AUTO DIFF WBC: CPT | Performed by: HOSPITALIST

## 2018-02-05 PROCEDURE — 77030029684 HC NEB SM VOL KT MONA -A

## 2018-02-05 PROCEDURE — 74011250637 HC RX REV CODE- 250/637: Performed by: HOSPITALIST

## 2018-02-05 PROCEDURE — 82962 GLUCOSE BLOOD TEST: CPT

## 2018-02-05 PROCEDURE — 77010033678 HC OXYGEN DAILY

## 2018-02-05 RX ORDER — INSULIN LISPRO 100 [IU]/ML
INJECTION, SOLUTION INTRAVENOUS; SUBCUTANEOUS
Status: DISCONTINUED | OUTPATIENT
Start: 2018-02-05 | End: 2018-02-06

## 2018-02-05 RX ADMIN — VANCOMYCIN HYDROCHLORIDE 125 MG: 1 INJECTION, POWDER, LYOPHILIZED, FOR SOLUTION INTRAVENOUS at 01:52

## 2018-02-05 RX ADMIN — PRAMIPEXOLE DIHYDROCHLORIDE 0.12 MG: 0.25 TABLET ORAL at 09:38

## 2018-02-05 RX ADMIN — IPRATROPIUM BROMIDE AND ALBUTEROL SULFATE 3 ML: .5; 3 SOLUTION RESPIRATORY (INHALATION) at 07:42

## 2018-02-05 RX ADMIN — NORTRIPTYLINE HYDROCHLORIDE 75 MG: 25 CAPSULE ORAL at 21:48

## 2018-02-05 RX ADMIN — INSULIN LISPRO 8 UNITS: 100 INJECTION, SOLUTION INTRAVENOUS; SUBCUTANEOUS at 09:37

## 2018-02-05 RX ADMIN — MODAFINIL 200 MG: 100 TABLET ORAL at 11:19

## 2018-02-05 RX ADMIN — INSULIN DETEMIR 7 UNITS: 100 INJECTION, SOLUTION SUBCUTANEOUS at 15:11

## 2018-02-05 RX ADMIN — CITALOPRAM HYDROBROMIDE 40 MG: 20 TABLET ORAL at 09:38

## 2018-02-05 RX ADMIN — INSULIN LISPRO 3 UNITS: 100 INJECTION, SOLUTION INTRAVENOUS; SUBCUTANEOUS at 16:44

## 2018-02-05 RX ADMIN — NORTRIPTYLINE HYDROCHLORIDE 150 MG: 25 CAPSULE ORAL at 17:04

## 2018-02-05 RX ADMIN — IPRATROPIUM BROMIDE AND ALBUTEROL SULFATE 3 ML: .5; 3 SOLUTION RESPIRATORY (INHALATION) at 15:56

## 2018-02-05 RX ADMIN — VANCOMYCIN HYDROCHLORIDE 125 MG: 1 INJECTION, POWDER, LYOPHILIZED, FOR SOLUTION INTRAVENOUS at 12:28

## 2018-02-05 RX ADMIN — IPRATROPIUM BROMIDE AND ALBUTEROL SULFATE 3 ML: .5; 3 SOLUTION RESPIRATORY (INHALATION) at 20:42

## 2018-02-05 RX ADMIN — INSULIN LISPRO 2 UNITS: 100 INJECTION, SOLUTION INTRAVENOUS; SUBCUTANEOUS at 21:47

## 2018-02-05 RX ADMIN — LORAZEPAM 1 MG: 2 INJECTION INTRAMUSCULAR; INTRAVENOUS at 17:51

## 2018-02-05 RX ADMIN — VANCOMYCIN HYDROCHLORIDE 125 MG: 1 INJECTION, POWDER, LYOPHILIZED, FOR SOLUTION INTRAVENOUS at 06:40

## 2018-02-05 RX ADMIN — ENOXAPARIN SODIUM 40 MG: 40 INJECTION SUBCUTANEOUS at 21:46

## 2018-02-05 RX ADMIN — PRAMIPEXOLE DIHYDROCHLORIDE 0.12 MG: 0.25 TABLET ORAL at 15:11

## 2018-02-05 RX ADMIN — IPRATROPIUM BROMIDE AND ALBUTEROL SULFATE 3 ML: .5; 3 SOLUTION RESPIRATORY (INHALATION) at 01:28

## 2018-02-05 RX ADMIN — PRAMIPEXOLE DIHYDROCHLORIDE 0.12 MG: 0.25 TABLET ORAL at 21:48

## 2018-02-05 RX ADMIN — INSULIN LISPRO 8 UNITS: 100 INJECTION, SOLUTION INTRAVENOUS; SUBCUTANEOUS at 12:28

## 2018-02-05 RX ADMIN — VANCOMYCIN HYDROCHLORIDE 125 MG: 1 INJECTION, POWDER, LYOPHILIZED, FOR SOLUTION INTRAVENOUS at 18:24

## 2018-02-05 RX ADMIN — INSULIN DETEMIR 7 UNITS: 100 INJECTION, SOLUTION SUBCUTANEOUS at 21:47

## 2018-02-05 RX ADMIN — DEXTROSE MONOHYDRATE, SODIUM CHLORIDE, AND POTASSIUM CHLORIDE 125 ML/HR: 50; 4.5; 1.49 INJECTION, SOLUTION INTRAVENOUS at 05:43

## 2018-02-05 NOTE — PROGRESS NOTES
Problem: Falls - Risk of  Goal: *Absence of Falls  Document Ernie Fall Risk and appropriate interventions in the flowsheet.    Outcome: Progressing Towards Goal  Fall Risk Interventions:       Mentation Interventions: Bed/chair exit alarm, Evaluate medications/consider consulting pharmacy    Medication Interventions: Bed/chair exit alarm, Evaluate medications/consider consulting pharmacy    Elimination Interventions: Call light in reach, Patient to call for help with toileting needs

## 2018-02-05 NOTE — PROGRESS NOTES
Chart reviewed and I spoke to pt and his brother (whom he lives with) about snf's. Brother was very interested in them, he said he was going to ask about them today. I gave him the listing and he said I could post to [facility] snf's. He indicated pt needed help since he got so weak. Case Management to follow.

## 2018-02-05 NOTE — ROUTINE PROCESS
TRANSFER - OUT REPORT:    Verbal report given to Devyn Banda RN(name) on Lisa Patel  being transferred to ICU (5545)(unit) for change in patient condition(Oxygen level and BP)       Report consisted of patients Situation, Background, Assessment and   Recommendations(SBAR). Information from the following report(s) SBAR, Kardex, Procedure Summary, Intake/Output, MAR and Recent Results was reviewed with the receiving nurse. Lines:   Peripheral IV 02/03/18 Left Antecubital (Active)   Site Assessment Clean, dry, & intact 2/4/2018 12:52 PM   Phlebitis Assessment 0 2/4/2018 12:52 PM   Infiltration Assessment 0 2/4/2018 12:52 PM   Dressing Status Clean, dry, & intact 2/4/2018 12:52 PM   Dressing Type Transparent;Tape 2/4/2018 12:52 PM   Hub Color/Line Status Pink;Flushed;Capped 2/4/2018 12:52 PM   Action Taken Open ports on tubing capped 2/4/2018 12:52 PM   Alcohol Cap Used Yes 2/4/2018 12:52 PM        Opportunity for questions and clarification was provided.       Patient transported with:   O2 @ 15, NRB liters  Registered Nurse

## 2018-02-05 NOTE — PROGRESS NOTES
Assumed pt's care, O2 at 62 on 6L, NC. RT paged, pt placed on NRB at 15 L., O2 at 93 and dropped  Down to 73.  BP 90/56, HR 67, RR 28  Pt to tx to 2702  0655: Telephone call made to pt's brother, Mr. Jeffy Russell regarding pt's transfer to ICU, 2700

## 2018-02-05 NOTE — CDMP QUERY
Patient is noted to have a BMI of 17.75. Please clarify if this patient is:     =>Underweight  =>Cachexia  =>Failure to Thrive  =>Other explanation of clinical findings  =>Unable to determine (no explanation for clinical findings)    Presentation: 110 lbs; 5'6\" = BMI     Please clarify and document your clinical opinion in the progress notes and discharge summary.      Thanks, [address] [facility], [address] [facility]

## 2018-02-05 NOTE — PROGRESS NOTES
Problem: Falls - Risk of  Goal: *Absence of Falls  Document Ernie Fall Risk and appropriate interventions in the flowsheet.    Fall Risk Interventions:       Mentation Interventions: Bed/chair exit alarm, Evaluate medications/consider consulting pharmacy    Medication Interventions: Bed/chair exit alarm, Evaluate medications/consider consulting pharmacy    Elimination Interventions: Call light in reach, Patient to call for help with toileting needs

## 2018-02-05 NOTE — CDMP QUERY
Please clarify if this patient is being treated/managed for:    =>Acute hypoxic respiratory failure resolved with BIPAP,  =>Other Explanation of clinical findings  =>Unable to Determine (no explanation of clinical findings)    The medical record reflects the following:    Risk: C. difficile colitis recieving extra IVF    Clinical Indicators: Nurses notes- Patient again showed s/s of respiratory distress. MD Ismael Kelly notified with new orders to Transport patient to the ICU with a BIPAP. IM-Rapid response called as pt was hypoxemic with sats low at 78% and tachypneic with RR 28  Arrived promptly at pt's bedside and examined pt  S: Pt c/o SOB O: Placed on NRB and sats improved to 93-95 % and BP was okay  Stat ABG done and result reviewed. Stat CXR ordered. Sats improved to 93 % with NRB. Ordered stat transfer to MICU for BIPAP   abgs- pH 7.57, pCO2 20.1, pO2- 344, sO2 100 % on NRB    Treatment: BIPAP, respiratory support and transfer to  ICU    Please clarify and document your clinical opinion in the progress notes and discharge summary.     [address] [facility],2Nd Floor, [address]

## 2018-02-05 NOTE — PROGRESS NOTES
Problem: Falls - Risk of  Goal: *Absence of Falls  Document Ernie Fall Risk and appropriate interventions in the flowsheet.    Outcome: Progressing Towards Goal  Fall Risk Interventions:       Mentation Interventions: Bed/chair exit alarm, Evaluate medications/consider consulting pharmacy, More frequent rounding, Reorient patient, Room close to nurse's station    Medication Interventions: Patient to call before getting OOB, Bed/chair exit alarm    Elimination Interventions: Call light in reach, Patient to call for help with toileting needs, Toileting schedule/hourly rounds

## 2018-02-05 NOTE — PROGRESS NOTES
Received patient from the floor - patient was a rapid response around 1717, c/o SOB, ABG showed metabolic alkalosis - nursing has called RT to bedside while on the floor due to low sats, when pulse ox probe was moved, sats read high 90s - upon arrival to ICU at approx 2255, patient was on a NRB, RR in the 30s - explained BiPAP to patient and placed patient on BiPAP - patient immediately grew very anxious and stated several times he could not breathe on the mask - RR.60 - RN notified - RT remained at bedside with patient and encouraged slower breathing - RR went down to the 30s - patient had very high tidal volumes and remains anxious - RN aware and will ask for anti-anxiety meds - currently, patient RR is slowly increasing to the 40s, sats have remained 100% since his arrival to ICU - will continue to monitor    0456 - patrient remains on BiPAP - appears to be resting comfortably at this time

## 2018-02-05 NOTE — ROUTINE PROCESS
TRANSFER - OUT REPORT:    Verbal report given to [facility], RN (name) on Hernandez Neal  being transferred to [facility] (unit) for routine progression of care       Report consisted of patients Situation, Background, Assessment and   Recommendations(SBAR). Information from the following report(s) SBAR, Kardex, Intake/Output, MAR, Recent Results and Cardiac Rhythm SR was reviewed with the receiving nurse. Lines:   Peripheral IV 02/03/18 Left Antecubital (Active)   Site Assessment Clean, dry, & intact 2/5/2018  5:00 PM   Phlebitis Assessment 0 2/5/2018  5:00 PM   Infiltration Assessment 0 2/5/2018  5:00 PM   Dressing Status Clean, dry, & intact 2/5/2018  5:00 PM   Dressing Type Transparent 2/5/2018  4:00 PM   Hub Color/Line Status Pink; Infusing 2/5/2018  4:00 PM   Action Taken Open ports on tubing capped 2/5/2018  4:00 PM   Alcohol Cap Used Yes 2/5/2018  4:00 PM        Opportunity for questions and clarification was provided.       Patient transported with:   Monitor  Registered Nurse

## 2018-02-05 NOTE — PROGRESS NOTES
Rapid response called as pt was hypoxemic with sats low at 78% and tachypneic with RR 28  Arrived promptly at pt's bedside and examined pt  S: Pt c/o SOB  O: Placed on NRB and sats improved to 93-95 % and BP was okay  A/P  Stat ABG done and result reviewed  Stat CXR ordered  CBC, CMP, and CE ordered  Sats improved to 93 % with NRB  Ordered stat transfer to MICU for BIPAP  Instructed nurse to titrate FIO2 to maintain sats greater than 92% at all times

## 2018-02-05 NOTE — PROGRESS NOTES
Problem: Dysphagia (Adult)  Goal: *Acute Goals and Plan of Care (Insert Text)  Recommendations:  Diet: puree/thin  Meds: one at a time   Aspiration Precautions  Oral Care TID  Other: may benefit from MBS once medically stable    Goals:  Patient will:  1. Tolerate PO trials with 0 s/s overt distress in 4/5 trials  2. Utilize compensatory swallow strategies/maneuvers (decrease bite/sip, size/rate, alt. liq/sol) with min cues in 4/5 trials  3. Perform oral-motor/laryngeal exercises to increase oropharyngeal swallow function with min cues  4. Complete an objective swallow study (i.e., MBSS) to assess swallow integrity, r/o aspiration, and determine of safest LRD, min A      Outcome: Not Progressing Towards Goal    Speech LAnguage Pathology bedside swallow   evaluation & TREATMENT     Patient: Christopher Swann (56 y.o. male)  Date: 2/5/2018  Primary Diagnosis: C. difficile colitis        Precautions: aspiration     PLOF: soft diet  ASSESSMENT :  Based on the objective data described below, the patient presents with moderate oropharyngeal dysphagia in the setting of general debility. Pt A&Ox4; anxious and reporting difficulty \"with lots of solids; they just get stuck. \". Further reports >50# recent weight loss. Pt accepted serial swallows of thin liquids without aspiration s/s. Strong encouragement for acceptance of cracker; pt with immediate coughing once swallow triggered. SLP educated pt on safest least restrictive diet being puree; with strong encouragement, pt agreeable to puree at this time with SLP to follow to determine level of feelings of globus or \"it just wont go down\". Further pt to benefit from [facility] once medically stable. D/w RN, Steve Hamm. Skilled therapy initiated with cues for decreasing bite size and using liquid as wash for swallow trigger. Educated pt on role of ST with swallowing in the setting of his medical hx; specifically TBI and PKN dz. Pt verbalized comprehension.  SLP further d/w RD for obtaining preferences to better, safely meet nutritional needs. Patient will benefit from skilled intervention to address the above impairments. Patients rehabilitation potential is considered to be Fair  Factors which may influence rehabilitation potential include:   []            None noted  [x]            Mental ability/status  [x]            Medical condition  [x]            Home/family situation and support systems  [x]            Safety awareness  []            Pain tolerance/management  []            Other:      PLAN :  Recommendations and Planned Interventions:  puree/thin  Frequency/Duration: Patient will be followed by speech-language pathology 1-2 times per day/4-7 days per week to address goals. Discharge Recommendations: To Be Determined     SUBJECTIVE:   Patient stated I'll try it. OBJECTIVE:     Past Medical History:   Diagnosis Date    Anxiety     Diabetes ([facility] [address]     Ill-defined condition     hypotension    Psychiatric disorder     depression   History reviewed. No pertinent surgical history. Prior Level of Function/Home Situation: lives with brother     Diet prior to admission: soft/thin  Current Diet:  Puree/thin   Cognitive and Communication Status:  Neurologic State: Alert  Orientation Level: Oriented X4  Cognition: Follows commands  Perception: Appears intact  Perseveration: No perseveration noted  Safety/Judgement: Fall prevention  Oral Assessment:  Oral Assessment  Labial: Decreased rate;Decreased seal;Right droop  Dentition: Limited  Oral Hygiene: Good  Lingual: Decreased rate;Decreased strength  Velum: No impairment  Mandible: No impairment  P.O. Trials:  Patient Position: HOB 45  Vocal quality prior to P.O.: Low volume  Consistency Presented: Thin liquid; Solid;Pudding  How Presented: Self-fed/presented;SLP-fed/presented;Straw;Successive swallows     Bolus Acceptance: No impairment  Bolus Formation/Control: Impaired  Type of Impairment: Delayed;Mastication  Propulsion: Delayed (# of seconds)  Oral Residue: None  Initiation of Swallow: Delayed (# of seconds)  Laryngeal Elevation: Decreased  Aspiration Signs/Symptoms: Weak cough  Pharyngeal Phase Characteristics: Poor endurance; Suspected pharyngeal residue  Effective Modifications: Small sips and bites  Cues for Modifications: Moderate       Oral Phase Severity: Mild  Pharyngeal Phase Severity : Moderate    GCODESwallowing:  Swallow Current Status CJ= 20-39%   Swallow Goal Status CI= 1-19%    The severity rating is based on the following outcomes:  BRADY Noms Swallow Level 5    Clinical Judgement    PAIN:  Start of Eval/Tx: 0  End of Eval/Tx: 0     After treatment:   []            Patient left in no apparent distress sitting up in chair  [x]            Patient left in no apparent distress in bed  [x]            Call bell left within reach  [x]            Nursing notified  []            Family present  []            Caregiver present  []            Bed alarm activated    COMMUNICATION/EDUCATION:   [x]            Aspiration precautions; swallow safety; compensatory techniques. [x]            Patient/family have participated as able in goal setting and plan of care. [x]            Patient/family agree to work toward stated goals and plan of care. []            Patient understands intent and goals of therapy; neutral about participation. []            Patient unable to participate in goal setting/plan of care; educ ongoing with interdisciplinary staff  []         Posted safety precautions in patient's room.     Thank you for this referral.  HUGO Blum  Evaluation Time: 10 minutes  Treatment Time: 10 minutes

## 2018-02-05 NOTE — DIABETES MGMT
NUTRITIONAL ASSESSMENT GLYCEMIC CONTROL/ PLAN OF CARE     Lorenzo Noriega           56 y.o.           2/3/2018                 1. Diarrhea of presumed infectious origin       INTERVENTIONS/PLAN:   1. Basal insulin may soon be indicated. If so, suggest starting with very low dose of 4 units Levemir every 12 hours. 2.  Implement preferences and send calorie dense foods  3. Monitor glycemic control, labs and weights. ASSESSMENT:   Nutritional Status:   Pt is 81% ideal wt with thin appearance, history of poor appetite with unintentional weight loss and dysphagia. Nutrition Diagnoses:    Underweight due to decreased energy intake as evidenced by BMI of 18.7%. Unintentional weight loss due to decreased appetite as evidenced by reported 35 lb weight loss in past 6 months and 65 lb weight loss in past 1.5 years. Difficulty swallowing due to dysphagia in the setting of Parkinson's disease as evidenced by puree diet. Altered GI function due to C. Diff as evidenced by loose stools. Altered nutrition related labs due to T1DM as evidenced by BG of 7.0%. Diabetes Management:   Pt with T1DM and he reports his BG frequently goes [place] and low\" for no reason. He relates he is usually asymptomatic when his BG is low. Pt with well controlled diabetes PTA  per A1C but this test may be misleading in event pt with brittle T1DM. Pt  has experienced severe hypoglycemia x 2 in hospital on 2/2/18 and 2/3/18 with no insulin administered in hospital prior to these events. Today's BG readings are elevated with pt receiving corrective lispro four times daily. Pt may eventually benefit from a very low dose of basal insulin but in light of recent hypoglycemia episodes, could consider monitoring BG for remainder of today to ensure BG remains stable. When ready to start basal insulin, suggest very low split dose of 4 units Levemir every 12 hours.     Recent blood glucose:    2/5/18:  304, 306   2/4/18:  129, 32, 65, 156, 146, 191, 216, 248 - received 8 units corrective lispro  2/3/18:  39, 75    Within target range (non-ICU: <140; ICU<180): [] Yes   [x]  No    Current Insulin regimen:   Corrective lispro normal insulin sensitivity ACHS   Home medication/insulin regimen:   Lantus 14 units every night  Novolog TID at meals, no dose stated  HbA1c: 7.0% - ave BG has been ~ 150 mg/dL over past 3 months. Adequate glycemic control PTA:  [x] Yes  [] No       SUBJECTIVE/OBJECTIVE:   Information obtained from: chart review, pt, ICU rounds, SLP  Pt reports that 1.5 years ago his usual weight was 180# and 6 months ago his weight was 150 #. He relates having reflux and it makes foods taste bad. He also complained of his mouth being dry. He confirms having problems with swallowing. Pt denies having food allergies and does not have problems with chewing. He lives with his brother who prepares his meals and pt is trying to get stronger so he too can prepare meals. Pt admitted with C diff colitis. PMHx includes T1DM, traumatic brain injury, Parkinson's disease, psychiatric disorder. Diet: orders written for pureed diet, consistent CHO, thin lqiuids. No data found.     Medications: [x]                Reviewed   IVF:  D5 1/2 NS with 20 mEq KCl/L at 125 ml/hr (providing 510 calories/day)    Most Recent POC Glucose:   Recent Labs      02/05/18   0425  02/04/18   1726  02/04/18   0648  02/03/18   1010   GLU  256*  192*  67*  240*         Labs:   Lab Results   Component Value Date/Time    Hemoglobin A1c 7.0 02/03/2018 10:10 AM     Lab Results   Component Value Date/Time    Sodium 142 02/05/2018 04:25 AM    Potassium 3.6 02/05/2018 04:25 AM    Chloride 107 02/05/2018 04:25 AM    CO2 20 02/05/2018 04:25 AM    Anion gap 15 02/05/2018 04:25 AM    Glucose 256 02/05/2018 04:25 AM    BUN 9 02/05/2018 04:25 AM    Creatinine 0.61 02/05/2018 04:25 AM    Calcium 7.3 02/05/2018 04:25 AM    Albumin 2.7 02/03/2018 10:10 AM       Anthropometrics: IBW : 64.4 kg (142 lb), % IBW (Calculated): 81.35 %, BMI (calculated): 18.7  Wt Readings from Last 1 Encounters:   02/04/18 52.4 kg (115 lb 8.3 oz)      Ht Readings from Last 1 Encounters:   02/04/18 5' 6\" (1.676 m)     Last Weight Metrics:  Weight Loss Metrics 2/4/2018 1/12/2018   Today's Wt 115 lb 8.3 oz 116 lb   BMI 18.65 kg/m2 18.72 kg/m2       Estimated Nutrition Needs:  1956 Kcals/day, Protein (g): 79 g Fluid (ml): 2000 ml  Based on:   [x]          Actual BW    []          ABW   []            Adjusted BW           Nutrition Interventions:  Pureed diet with thin liquids per SLP  CHO consistent diet  Sugar Free [place] Instant Breakfast TID  Implement preferences  Goal:   Blood glucose will be within target range of  mg/dL by 2/8/18. Pt will consume >75% meals by 2/10/18. Pt will maintain weight (+/- 1-2 kg) or gain weight (1-2#) by 2/15/18.         Nutrition Monitoring and Evaluation      [x]     Monitor po intake on meal rounds  [x]     Continue inpatient monitoring and intervention  []     Other:      Nutrition Risk:  [x]   High     []  Moderate    []  Minimal/Uncompromised    Odilia Tipton, [address], [facility]   Office:  [address] [place] [address] Pager:  [phone]

## 2018-02-05 NOTE — PROGRESS NOTES
Called to patients bedside for low SP02. Patient SP02 on bedside monitor 74%. Pulsox probe moved to forehead and reading 100%.

## 2018-02-05 NOTE — PROGRESS NOTES
02/04/2018 2300 Assumed care of pt after bedside report from [address] [place] [address]. Pt to ICU for BIPAP. Pt anxious and states does not want BIPAP and that he cannot breathe with mask on. Pt reassures. Monitor denotes NSR. SBP noted to be 80's all day on 3S. Lungs coarse, diminished. AAO x 3. REYES x 4. Dr. Stephen Gray paged regarding low BP and severe anxiety. Orders given. Abd flat, soft. Pt having diarrhea. Pt noted to have reddened skin at sacrum, coccyx area. Mepilex applied. Pt also with reddened, boggy heels. Pt has mepilex on rt heel and this nurse applied Mepilex to lt heel. Prevalon boots also applied . Pt given 500 CC IV bolus of NSS. Ativan 1 mg IV push given as ordered. 02/05/2018 0000 Resting without complaints. Pt incontinent of med mucous tan stool . 0400 AM labs drawn and sent to lab. 0600  Pt status unchanged.

## 2018-02-05 NOTE — PROGRESS NOTES
Physical Exam   Skin:        Primary Nurse Ashlee Best and Vikki May, RN performed a dual skin assessment on this patient No impairment noted  Balwinder score is 16.

## 2018-02-05 NOTE — PROGRESS NOTES
12- Report received from Eddie Fitzgerald, [facility] and assumed care of patient. Patient resting comfortably on Bipap. No signs of distress. VS stable. Will continue to monitor. 0930- Patient taken off Bipap and on RA. O2 sat 100%. VS stable. Patient up in bed eating breakfast. Will continue to monitor. 1000- Rounded with treatment team. New orders received. 1130- Speech at bedside to perform swallow eval.  1200- Patient up eating lunch in bed. VS stable. No signs of distress. Will continue to monitor. [id number]- Patient stating that he feels short of breath. VS stable. O2 sat 100% on RA and RR 18. RT notified. RT to come to bedside to do breathing treatment. 1610- Patient VS stable and that he is able to breathe easier. No signs of distress. Will continue to monitor. [address] [facility] Patient's brother Kathleen Davila notified that patient is moving to [address] [place] [address] [phone]).

## 2018-02-06 ENCOUNTER — APPOINTMENT (OUTPATIENT)
Dept: GENERAL RADIOLOGY | Age: 58
DRG: 371 | End: 2018-02-06
Attending: HOSPITALIST
Payer: MEDICARE

## 2018-02-06 LAB
ALBUMIN SERPL-MCNC: 1.7 G/DL (ref 3.4–5)
ALBUMIN/GLOB SERPL: 0.7 {RATIO} (ref 0.8–1.7)
ALP SERPL-CCNC: 58 U/L (ref 45–117)
ALT SERPL-CCNC: 11 U/L (ref 16–61)
ANION GAP SERPL CALC-SCNC: 9 MMOL/L (ref 3–18)
AST SERPL-CCNC: 10 U/L (ref 15–37)
BASOPHILS # BLD: 0.1 K/UL (ref 0–0.06)
BASOPHILS NFR BLD: 1 % (ref 0–2)
BILIRUB SERPL-MCNC: 0.3 MG/DL (ref 0.2–1)
BUN SERPL-MCNC: 6 MG/DL (ref 7–18)
BUN/CREAT SERPL: 11 (ref 12–20)
CALCIUM SERPL-MCNC: 7.9 MG/DL (ref 8.5–10.1)
CHLORIDE SERPL-SCNC: 109 MMOL/L (ref 100–108)
CO2 SERPL-SCNC: 27 MMOL/L (ref 21–32)
CREAT SERPL-MCNC: 0.53 MG/DL (ref 0.6–1.3)
DIFFERENTIAL METHOD BLD: ABNORMAL
EOSINOPHIL # BLD: 0.2 K/UL (ref 0–0.4)
EOSINOPHIL NFR BLD: 3 % (ref 0–5)
ERYTHROCYTE [DISTWIDTH] IN BLOOD BY AUTOMATED COUNT: 15.2 % (ref 11.6–14.5)
GLOBULIN SER CALC-MCNC: 2.3 G/DL (ref 2–4)
GLUCOSE BLD STRIP.AUTO-MCNC: 278 MG/DL (ref 70–110)
GLUCOSE BLD STRIP.AUTO-MCNC: 305 MG/DL (ref 70–110)
GLUCOSE BLD STRIP.AUTO-MCNC: 307 MG/DL (ref 70–110)
GLUCOSE BLD STRIP.AUTO-MCNC: 37 MG/DL (ref 70–110)
GLUCOSE BLD STRIP.AUTO-MCNC: 81 MG/DL (ref 70–110)
GLUCOSE SERPL-MCNC: 57 MG/DL (ref 74–99)
HCT VFR BLD AUTO: 31.6 % (ref 36–48)
HGB BLD-MCNC: 10.5 G/DL (ref 13–16)
LYMPHOCYTES # BLD: 2 K/UL (ref 0.9–3.6)
LYMPHOCYTES NFR BLD: 26 % (ref 21–52)
MAGNESIUM SERPL-MCNC: 1.9 MG/DL (ref 1.6–2.6)
MCH RBC QN AUTO: 29.7 PG (ref 24–34)
MCHC RBC AUTO-ENTMCNC: 33.2 G/DL (ref 31–37)
MCV RBC AUTO: 89.5 FL (ref 74–97)
MONOCYTES # BLD: 0.5 K/UL (ref 0.05–1.2)
MONOCYTES NFR BLD: 6 % (ref 3–10)
NEUTS SEG # BLD: 4.8 K/UL (ref 1.8–8)
NEUTS SEG NFR BLD: 64 % (ref 40–73)
PLATELET # BLD AUTO: 256 K/UL (ref 135–420)
PMV BLD AUTO: 9.8 FL (ref 9.2–11.8)
POTASSIUM SERPL-SCNC: 3 MMOL/L (ref 3.5–5.5)
PROT SERPL-MCNC: 4 G/DL (ref 6.4–8.2)
RBC # BLD AUTO: 3.53 M/UL (ref 4.7–5.5)
SODIUM SERPL-SCNC: 145 MMOL/L (ref 136–145)
WBC # BLD AUTO: 7.6 K/UL (ref 4.6–13.2)

## 2018-02-06 PROCEDURE — 97162 PT EVAL MOD COMPLEX 30 MIN: CPT

## 2018-02-06 PROCEDURE — 65660000000 HC RM CCU STEPDOWN

## 2018-02-06 PROCEDURE — 80053 COMPREHEN METABOLIC PANEL: CPT | Performed by: HOSPITALIST

## 2018-02-06 PROCEDURE — 94640 AIRWAY INHALATION TREATMENT: CPT

## 2018-02-06 PROCEDURE — 74011250637 HC RX REV CODE- 250/637: Performed by: HOSPITALIST

## 2018-02-06 PROCEDURE — 92611 MOTION FLUOROSCOPY/SWALLOW: CPT

## 2018-02-06 PROCEDURE — 74011000255 HC RX REV CODE- 255: Performed by: RADIOLOGY

## 2018-02-06 PROCEDURE — 74011250636 HC RX REV CODE- 250/636: Performed by: HOSPITALIST

## 2018-02-06 PROCEDURE — 74011000250 HC RX REV CODE- 250: Performed by: HOSPITALIST

## 2018-02-06 PROCEDURE — 83735 ASSAY OF MAGNESIUM: CPT | Performed by: HOSPITALIST

## 2018-02-06 PROCEDURE — 36415 COLL VENOUS BLD VENIPUNCTURE: CPT | Performed by: HOSPITALIST

## 2018-02-06 PROCEDURE — 92526 ORAL FUNCTION THERAPY: CPT

## 2018-02-06 PROCEDURE — 74011250636 HC RX REV CODE- 250/636: Performed by: FAMILY MEDICINE

## 2018-02-06 PROCEDURE — 85025 COMPLETE CBC W/AUTO DIFF WBC: CPT | Performed by: HOSPITALIST

## 2018-02-06 PROCEDURE — 82962 GLUCOSE BLOOD TEST: CPT

## 2018-02-06 PROCEDURE — 74011000258 HC RX REV CODE- 258: Performed by: HOSPITALIST

## 2018-02-06 PROCEDURE — 74230 X-RAY XM SWLNG FUNCJ C+: CPT

## 2018-02-06 PROCEDURE — 74011636637 HC RX REV CODE- 636/637: Performed by: HOSPITALIST

## 2018-02-06 RX ORDER — POTASSIUM CHLORIDE 7.45 MG/ML
10 INJECTION INTRAVENOUS
Status: COMPLETED | OUTPATIENT
Start: 2018-02-06 | End: 2018-02-06

## 2018-02-06 RX ORDER — INSULIN LISPRO 100 [IU]/ML
INJECTION, SOLUTION INTRAVENOUS; SUBCUTANEOUS
Status: DISCONTINUED | OUTPATIENT
Start: 2018-02-06 | End: 2018-02-07

## 2018-02-06 RX ORDER — IPRATROPIUM BROMIDE AND ALBUTEROL SULFATE 2.5; .5 MG/3ML; MG/3ML
3 SOLUTION RESPIRATORY (INHALATION)
Status: DISCONTINUED | OUTPATIENT
Start: 2018-02-06 | End: 2018-02-08 | Stop reason: HOSPADM

## 2018-02-06 RX ORDER — DEXTROSE MONOHYDRATE AND SODIUM CHLORIDE 5; .45 G/100ML; G/100ML
50 INJECTION, SOLUTION INTRAVENOUS CONTINUOUS
Status: DISCONTINUED | OUTPATIENT
Start: 2018-02-06 | End: 2018-02-08

## 2018-02-06 RX ADMIN — VANCOMYCIN HYDROCHLORIDE 125 MG: 1 INJECTION, POWDER, LYOPHILIZED, FOR SOLUTION INTRAVENOUS at 00:10

## 2018-02-06 RX ADMIN — BARIUM SULFATE 30 ML: 0.81 POWDER, FOR SUSPENSION ORAL at 10:34

## 2018-02-06 RX ADMIN — GLUCAGON HYDROCHLORIDE 1 MG: KIT at 08:48

## 2018-02-06 RX ADMIN — PRAMIPEXOLE DIHYDROCHLORIDE 0.12 MG: 0.25 TABLET ORAL at 18:30

## 2018-02-06 RX ADMIN — PRAMIPEXOLE DIHYDROCHLORIDE 0.12 MG: 0.25 TABLET ORAL at 09:38

## 2018-02-06 RX ADMIN — VANCOMYCIN HYDROCHLORIDE 125 MG: 1 INJECTION, POWDER, LYOPHILIZED, FOR SOLUTION INTRAVENOUS at 12:20

## 2018-02-06 RX ADMIN — IPRATROPIUM BROMIDE AND ALBUTEROL SULFATE 3 ML: .5; 3 SOLUTION RESPIRATORY (INHALATION) at 09:16

## 2018-02-06 RX ADMIN — NORTRIPTYLINE HYDROCHLORIDE 150 MG: 25 CAPSULE ORAL at 18:29

## 2018-02-06 RX ADMIN — PRAMIPEXOLE DIHYDROCHLORIDE 0.12 MG: 0.25 TABLET ORAL at 22:26

## 2018-02-06 RX ADMIN — NORTRIPTYLINE HYDROCHLORIDE 75 MG: 25 CAPSULE ORAL at 22:17

## 2018-02-06 RX ADMIN — LORAZEPAM 1 MG: 2 INJECTION INTRAMUSCULAR; INTRAVENOUS at 09:37

## 2018-02-06 RX ADMIN — VANCOMYCIN HYDROCHLORIDE 125 MG: 1 INJECTION, POWDER, LYOPHILIZED, FOR SOLUTION INTRAVENOUS at 05:41

## 2018-02-06 RX ADMIN — INSULIN LISPRO 2 UNITS: 100 INJECTION, SOLUTION INTRAVENOUS; SUBCUTANEOUS at 18:32

## 2018-02-06 RX ADMIN — VANCOMYCIN HYDROCHLORIDE 125 MG: 1 INJECTION, POWDER, LYOPHILIZED, FOR SOLUTION INTRAVENOUS at 18:32

## 2018-02-06 RX ADMIN — DEXTROSE MONOHYDRATE AND SODIUM CHLORIDE 50 ML/HR: 5; .45 INJECTION, SOLUTION INTRAVENOUS at 08:51

## 2018-02-06 RX ADMIN — IPRATROPIUM BROMIDE AND ALBUTEROL SULFATE 3 ML: .5; 3 SOLUTION RESPIRATORY (INHALATION) at 02:34

## 2018-02-06 RX ADMIN — MODAFINIL 200 MG: 100 TABLET ORAL at 11:03

## 2018-02-06 RX ADMIN — ENOXAPARIN SODIUM 40 MG: 40 INJECTION SUBCUTANEOUS at 22:19

## 2018-02-06 RX ADMIN — CITALOPRAM HYDROBROMIDE 40 MG: 20 TABLET ORAL at 09:39

## 2018-02-06 RX ADMIN — POTASSIUM CHLORIDE 10 MEQ: 7.46 INJECTION, SOLUTION INTRAVENOUS at 12:16

## 2018-02-06 RX ADMIN — LORAZEPAM 1 MG: 2 INJECTION INTRAMUSCULAR; INTRAVENOUS at 22:19

## 2018-02-06 RX ADMIN — POTASSIUM CHLORIDE 10 MEQ: 7.46 INJECTION, SOLUTION INTRAVENOUS at 09:42

## 2018-02-06 RX ADMIN — BARIUM SULFATE 15 ML: 400 PASTE ORAL at 10:34

## 2018-02-06 NOTE — CONSULTS
History and Physical    Referring Physician: None    Consult Date: 2/6/2018       Assessment & Recommendations:     Principal Problem:    C. difficile colitis (2/3/2018)    Active Problems:    Traumatic brain injury ([facility] (2/3/2018)      IDDM (insulin dependent diabetes mellitus) ([facility] (2/3/2018)      Parkinson's disease ([facility] (2/3/2018)      Psychiatric disorder (2/3/2018)      1. Recurrent Cdiff infection - Proceed with the following course of Vancomycin. Patient should follow outpatient for further evaluation and treatment. 125 mg orally four times daily for 14 days   125 mg orally twice daily for 7 days   125 mg orally once daily for 7 days   125 mg orally every other day for 7 days   125 mg orally every 3 days for 14 days    If patient fails we can consider fidaxomycin. Subjective:     Chief Complaint: diarrhea    History of Present Illness: Lorenzo Tang is a 56 y.o. male who is seen in consultation for recurrent clostridium dificile infection. Patient states that he was diagnosed with cdiff infection 2 weeks ago. He states that he was placed on a course of Vancomycin which her completed. However, he developed recurrent diarrhea 3 days ago. Patient reports multiple BM per day. He denies abdominal pain, nausea, vomiting, blood in the stool, fever, chills, sweats. He has lost over 50 lbs in the last year. The patient was recently at [facility] for head trauma with traumatic brain injury. Patient was admitted for further care. Past Medical History:   Diagnosis Date    Anxiety     Diabetes ([facility]     Ill-defined condition     hypotension    Psychiatric disorder     depression      History reviewed. No pertinent surgical history. History reviewed. No pertinent family history. Social History   Substance Use Topics    Smoking status: Former Smoker    Smokeless tobacco: Never Used    Alcohol use No       Prior to Admission medications    Medication Sig Start Date End Date Taking? Authorizing Provider   ALPRAZolam Venia Retort) 0.25 mg tablet Take 1 Tab by mouth every eight (8) hours as needed for Anxiety. Max Daily Amount: 0.75 mg. 1/13/18   Antonio Helton MD   citalopram (CELEXA) 40 mg tablet Take  by mouth daily. Indications: ANXIETY WITH DEPRESSION    Courtney Dash MD   modafinil (PROVIGIL) 200 mg tablet Take 200 mg by mouth daily. Courtney Dash MD   nortriptyline (PAMELOR) 75 mg capsule Take 150 mg by mouth nightly. Indications: 150mg at dinner and 75mg at hs    Courtney Dash MD   pramipexole (MIRAPEX) 0.125 mg tablet Take 0.125 mg by mouth three (3) times daily. Indications: IDIOPATHIC PARKINSONISM    Courtney Dash MD   insulin glargine (LANTUS) 100 unit/mL injection 14 Units by SubCUTAneous route nightly. Courtney Dash MD   insulin aspart (NOVOLOG) 100 unit/mL injection by SubCUTAneous route Before breakfast, lunch, dinner and at bedtime. Indications: type 1 diabetes mellitus    Courtney Dash MD     No Known Allergies     Review of Systems:  A detailed 10 organ review of systems is obtained with pertinent positives as listed in the History of Present Illness and Past Medical History. All others are negative. Objective: Intake and Output:       02/04 1901 - 02/06 0700  In: 2693.8 [P.O.:600; I.V.:2093.8]  Out: 1100 [Urine:1100]    Physical Exam:   General: NAD  Skin:  Extremities and face reveal no rashes. No telangiectasias on the chest wall. HEENT: Sclerae anicteric. No oral ulcers. No abnormal pigmentation of the lips. The neck is supple. Cardiovascular: Regular rate and rhythm. No murmurs, gallops, or rubs. PMI nondisplaced. Respiratory:  Comfortable breathing with no accessory muscle use. Clear breath sounds. GI:  Abdomen nondistended, soft, and nontender. Normal active bowel sounds. No masses palpable. Rectal:  Deferred  Musculoskeletal:  No pitting edema of the lower legs. Neurological:  Gross memory appears intact. Patient is alert and oriented.   Psychiatric:  Mood appears appropriate with judgement intact. Data Review:   Recent Results (from the past 24 hour(s))   GLUCOSE, POC    Collection Time: 02/05/18  3:05 PM   Result Value Ref Range    Glucose (POC) 266 (H) 70 - 110 mg/dL   GLUCOSE, POC    Collection Time: 02/05/18  4:23 PM   Result Value Ref Range    Glucose (POC) 198 (H) 70 - 110 mg/dL   GLUCOSE, POC    Collection Time: 02/05/18  9:36 PM   Result Value Ref Range    Glucose (POC) 174 (H) 70 - 110 mg/dL   CBC WITH AUTOMATED DIFF    Collection Time: 02/06/18  4:08 AM   Result Value Ref Range    WBC 7.6 4.6 - 13.2 K/uL    RBC 3.53 (L) 4.70 - 5.50 M/uL    HGB 10.5 (L) 13.0 - 16.0 g/dL    HCT 31.6 (L) 36.0 - 48.0 %    MCV 89.5 74.0 - 97.0 FL    MCH 29.7 24.0 - 34.0 PG    MCHC 33.2 31.0 - 37.0 g/dL    RDW 15.2 (H) 11.6 - 14.5 %    PLATELET 931 858 - 297 K/uL    MPV 9.8 9.2 - 11.8 FL    NEUTROPHILS 64 40 - 73 %    LYMPHOCYTES 26 21 - 52 %    MONOCYTES 6 3 - 10 %    EOSINOPHILS 3 0 - 5 %    BASOPHILS 1 0 - 2 %    ABS. NEUTROPHILS 4.8 1.8 - 8.0 K/UL    ABS. LYMPHOCYTES 2.0 0.9 - 3.6 K/UL    ABS. MONOCYTES 0.5 0.05 - 1.2 K/UL    ABS. EOSINOPHILS 0.2 0.0 - 0.4 K/UL    ABS. BASOPHILS 0.1 (H) 0.0 - 0.06 K/UL    DF AUTOMATED     METABOLIC PANEL, COMPREHENSIVE    Collection Time: 02/06/18  4:08 AM   Result Value Ref Range    Sodium 145 136 - 145 mmol/L    Potassium 3.0 (L) 3.5 - 5.5 mmol/L    Chloride 109 (H) 100 - 108 mmol/L    CO2 27 21 - 32 mmol/L    Anion gap 9 3.0 - 18 mmol/L    Glucose 57 (L) 74 - 99 mg/dL    BUN 6 (L) 7.0 - 18 MG/DL    Creatinine 0.53 (L) 0.6 - 1.3 MG/DL    BUN/Creatinine ratio 11 (L) 12 - 20      GFR est AA >60 >60 ml/min/1.73m2    GFR est non-AA >60 >60 ml/min/1.73m2    Calcium 7.9 (L) 8.5 - 10.1 MG/DL    Bilirubin, total 0.3 0.2 - 1.0 MG/DL    ALT (SGPT) 11 (L) 16 - 61 U/L    AST (SGOT) 10 (L) 15 - 37 U/L    Alk.  phosphatase 58 45 - 117 U/L    Protein, total 4.0 (L) 6.4 - 8.2 g/dL    Albumin 1.7 (L) 3.4 - 5.0 g/dL    Globulin 2.3 2.0 - 4.0 g/dL    A-G Ratio 0.7 (L) 0.8 - 1.7     GLUCOSE, POC    Collection Time: 02/06/18  8:45 AM   Result Value Ref Range    Glucose (POC) 37 (LL) 70 - 110 mg/dL   GLUCOSE, POC    Collection Time: 02/06/18  8:59 AM   Result Value Ref Range    Glucose (POC) 81 70 - 110 mg/dL   GLUCOSE, POC    Collection Time: 02/06/18 11:02 AM   Result Value Ref Range    Glucose (POC) 305 (H) 70 - 110 mg/dL         Signed By: Masha Coker MD     February 6, 2018

## 2018-02-06 NOTE — PROGRESS NOTES
Problem: Dysphagia (Adult)  Goal: *Acute Goals and Plan of Care (Insert Text)  Recommendations:  Diet: mech-soft/thin (NO STRAWS)  Meds: one at a time   Aspiration Precautions  Oral Care TID  Other: may benefit from MBS once medically stable    Goals:  Patient will:  1. Tolerate PO trials with 0 s/s overt distress in 4/5 trials  2. Utilize compensatory swallow strategies/maneuvers (decrease bite/sip, size/rate, alt. liq/sol) with min cues in 4/5 trials  3. Perform oral-motor/laryngeal exercises to increase oropharyngeal swallow function with min cues  4. Complete an objective swallow study (i.e., MBSS) to assess swallow integrity, r/o aspiration, and determine of safest LRD, min A - completed 2/6/18       Outcome: Progressing Towards Goal  Speech Pathology Modified barium swallow Study    Patient: Shashi Cheek (56 y.o. male)  Date: 2/6/2018  Primary Diagnosis: C. difficile colitis        Precautions: aspiration, SILENT        ASSESSMENT :  MBS completed with SILENT aspiration on thin liquid, after swallow on residuals from VF penetrate. Aspiration resolved with straw removal; mod penetration with cup sips. Pt refused nectar-thick liquid trials. Pudding and cracker accepted without aspiration. Deficits noted specifically with impaired lingual strength and function: ineffective oral bolus prep and transit with premature spillage into valleculae; swallow delay with visible tongue pumping; mildly decreased hyolaryngeal excursion noted with vallecular residue. Pt able to clear with cues for multiple swallows. Pt presents with moderate oropharyngeal dysphagia, as evidenced above, which places pt at risk for aspiration. At this time, safest for mech-soft solid, thin liquid diet (NO STRAWS); meds should be provided one at a time in pudding. It should be noted that pt reports he understands aspiration risk but he \"wants a normal diet\".  SLP utilized video of study for visual feedback, education, and recommendations for pt; verbalized comprehension. SLP will follow 1-2 visits for education. Patient will benefit from skilled intervention to address the above impairments. Patients rehabilitation potential is considered to be Fair  Factors which may influence rehabilitation potential include:   []              None noted  [x]              Mental ability/status  [x]              Medical condition  [x]              Home/family situation and support systems  [x]              Safety awareness  []              Pain tolerance/management  []              Other:      PLAN :  Recommendations and Planned Interventions:  mech-soft solid, thin liquid diet (NO STRAWS); meds should be provided one at a time in pudding. Frequency/Duration: Patient will be followed by speech-language pathology 1-2 visits to address goals. Discharge Recommendations: Home Health     SUBJECTIVE:   Patient stated You ask a stupid question, you get a stupid answer. OBJECTIVE:     Past Medical History:   Diagnosis Date    Anxiety     Diabetes ([facility]     Ill-defined condition     hypotension    Psychiatric disorder     depression   History reviewed. No pertinent surgical history. Prior Level of Function/Home Situation: lives with family    Home Situation  Home Environment: Private residence  [address] [place] [address] St: One story  Living Alone: No  Support Systems: Family member(s)  Patient Expects to be Discharged to[de-identified] Private residence  Current DME Used/Available at Home: None  Diet prior to admission: soft  Current Diet:  mech-soft solid, thin liquid diet (NO STRAWS); meds should be provided one at a time in pudding. Radiologist: Radha Mims Views: Lateral  Patient Position: 90    Trial 1: Trial 2:   Consistency Presented:  Thin liquid Consistency Presented: Pudding   How Presented: Straw;Cup/sip How Presented: SLP-fed/presented         Bolus Acceptance: No impairment Bolus Acceptance: No impairment   Bolus Formation/Control: Impaired: Delayed;Mastication;Premature spillage Bolus Formation/Control: Impaired: Delayed;Mastication;Premature spillage   Propulsion: Delayed (# of seconds) Propulsion: Delayed (# of seconds); Tongue pumping   Oral Residue: Palatal Oral Residue: Lingual;Palatal   Initiation of Swallow: Triggered at vallecula Initiation of Swallow: Triggered at valleculae   Timing: Pooling 1-5 sec Timing: Pooling 1-5 sec   Penetration: During swallow; To cords (via straw) Penetration: None   Aspiration/Timing: Silent ; After;From residual (with straw) Aspiration/Timing: No evidence of aspiration   Pharyngeal Clearance: Vallecular residue; Less than 10% Pharyngeal Clearance: Vallecular residue; Less than 10%   Attempted Modifications: Cup/sip     Effective Modifications: Cup/sip     Cues for Modifications: Moderate Cues for Modifications: None           Trial 3: Trial 4:   Consistency Presented: Mechanical soft     How Presented: SLP-fed/presented           Bolus Acceptance: No impairment     Bolus Formation/Control: Impaired: Delayed;Mastication;Premature spillage  :     Propulsion: Delayed (# of seconds); Tongue pumping     Oral Residue: Lingual     Initiation of Swallow: Triggered at valleculae    Timing: Pooling 1-5 sec     Penetration: None     Aspiration/Timing: No evidence of aspiration     Pharyngeal Clearance: Vallecular residue; Less than 10%                 Cues for Modifications: None             Decreased Tongue Base Retraction?: Yes  Laryngeal Elevation: Incomplete laryngeal closure  Aspiration/Penetration Score: 8 (Aspiration-Contrast passes cords/glottis with no effort to eject, ie/silent aspiration)  Pharyngeal Symmetry: Not assessed  Pharyngeal-Esophageal Segment: No impairment  Pharyngeal Dysfunction: Decreased tongue base retraction;Decreased strength;Decreased elevation/closure    Oral Phase Severity: Moderate  Pharyngeal Phase Severity: Mild moderate    GCODESwallowing:  Swallow Current Status CK= 40-59%   Swallow Goal Status CJ= 20-39%    The severity rating is based on the following outcomes:  BRADY Noms Swallow Level 4    8-point Penetration-Aspiration Scale: Score 8  Clinical Judgement    PAIN:  Start of Study: 0  End of Study: 0     COMMUNICATION/EDUCATION:   [x]  Aspiration risks/precautions; compensatory swallow techniques  [x]  Patient/family have participated as able in goal setting and plan of care. []  Patient/family agree to work toward stated goals and plan of care. [x]  Patient understands intent and goals of therapy, but is neutral about his/her participation. []  Patient is unable to participate in goal setting and plan of care.     Thank you for this referral.  Lemar Cowden, SLP  Time Calculation: 30 mins

## 2018-02-06 NOTE — PROGRESS NOTES
Problem: Falls - Risk of  Goal: *Absence of Falls  Document Ernie Fall Risk and appropriate interventions in the flowsheet.    Outcome: Progressing Towards Goal  Fall Risk Interventions:       Mentation Interventions: Adequate sleep, hydration, pain control, Bed/chair exit alarm, Update white board, Toileting rounds, Reorient patient, More frequent rounding, Family/sitter at bedside    Medication Interventions: Patient to call before getting OOB, Teach patient to arise slowly    Elimination Interventions: Call light in reach, Toileting schedule/hourly rounds, Patient to call for help with toileting needs

## 2018-02-06 NOTE — PROGRESS NOTES
Problem: Falls - Risk of  Goal: *Absence of Falls  Document Ernie Fall Risk and appropriate interventions in the flowsheet.    Outcome: Progressing Towards Goal  Fall Risk Interventions:       Mentation Interventions: Adequate sleep, hydration, pain control    Medication Interventions: Patient to call before getting OOB    Elimination Interventions: Call light in reach

## 2018-02-06 NOTE — PROGRESS NOTES
Progress Note      Patient: Marissa Carrera               Sex: male          DOA: 2/3/2018       YOB: 1960      Age:  56 y.o.        LOS:  LOS: 2 days               Subjective:   Pt is on po vancomycin for control of his C diff colitis . He was seen in the icu and was then sent to the floor .  He has a brother who is interested in sending the prt to a snf to help recover from his illness       Objective:      Visit Vitals    BP 91/48 (BP 1 Location: Right arm)    Pulse 87    Temp 99 °F (37.2 °C)    Resp 20    Ht 5' 6\" (1.676 m)    Wt 52.4 kg (115 lb 8.3 oz)    SpO2 100%    BMI 18.65 kg/m2       Physical Exam:  Pt  Is awake and is mildly responsive due to his brain injury and his parkinson's disease   Heart reg rate and rhythm   Lungs good breat sounds heard   Abdomen soft and nontender   Neuro pt has parkinson's disease         Lab/Data Reviewed:  BMP:   Lab Results   Component Value Date/Time     02/05/2018 04:25 AM    K 3.6 02/05/2018 04:25 AM     02/05/2018 04:25 AM    CO2 20 (L) 02/05/2018 04:25 AM    AGAP 15 02/05/2018 04:25 AM     (H) 02/05/2018 04:25 AM    BUN 9 02/05/2018 04:25 AM    CREA 0.61 02/05/2018 04:25 AM    GFRAA >60 02/05/2018 04:25 AM    GFRNA >60 02/05/2018 04:25 AM     CMP:   Lab Results   Component Value Date/Time     02/05/2018 04:25 AM    K 3.6 02/05/2018 04:25 AM     02/05/2018 04:25 AM    CO2 20 (L) 02/05/2018 04:25 AM    AGAP 15 02/05/2018 04:25 AM     (H) 02/05/2018 04:25 AM    BUN 9 02/05/2018 04:25 AM    CREA 0.61 02/05/2018 04:25 AM    GFRAA >60 02/05/2018 04:25 AM    GFRNA >60 02/05/2018 04:25 AM    CA 7.3 (L) 02/05/2018 04:25 AM     CBC:   Lab Results   Component Value Date/Time    WBC 10.3 02/05/2018 04:25 AM    HGB 11.0 (L) 02/05/2018 04:25 AM    HCT 34.0 (L) 02/05/2018 04:25 AM     02/05/2018 04:25 AM           Assessment/Plan     Principal Problem:    C. difficile colitis (2/3/2018)    Active Problems: Traumatic brain injury ([facility] (2/3/2018)      IDDM (insulin dependent diabetes mellitus) ([facility] (2/3/2018)      Parkinson's disease ([facility] (2/3/2018)      Psychiatric disorder (2/3/2018)        Plan:continue the vancomycin . Will send the pt to a snf for his recovery .

## 2018-02-06 NOTE — PROGRESS NOTES
IDR Summary      Patient: Max Rodriguez MRN: [id number]    Age: 56 y.o.  : 1960     Admit Diagnosis: C. difficile colitis      Problems pertinent to hospital stay: recurrent C-diff- GI consulted- possible fecal transplant    Consults:P.T, O.T., Speech and Case Management     Testing due for patient today? YES    Nutrition plan:Yes     Mobility needs: Yes      Lines/Tubes:   IV: YES   Needed: YES  Andujar: NO  Needed:NO  Central Line: NO Needed: NO      VTE Prophylaxis: Chemical    Influenza Vaccine received? YES        Care Management:  Discharge plan: SNF   PCP: None    : NO  Financial concerns:No   Interventions:       LOS: 3 days     Expected days until discharge: TBD       Signed:      DARLIN Abdullahi  [facility]  Hospitalist Division  Pager:  [phone]  Office:  [phone]

## 2018-02-06 NOTE — PROGRESS NOTES
Problem: Mobility Impaired (Adult and Pediatric)  Goal: *Acute Goals and Plan of Care (Insert Text)  Physical Therapy Goals  Initiated 2/6/2018 and to be accomplished within 7 day(s)  1. Patient will move from supine to sit and sit to supine in bed with modified independence. 2.  Patient will transfer from bed to chair and chair to bed with modified independence using the least restrictive device. 3.  Patient will perform sit to stand with modified independence. 4.  Patient will ambulate with modified independence for 150 feet with the least restrictive device. 5.  Patient will ascend/descend 10 stairs with handrail(s) with modified independence  . Outcome: Progressing Towards Goal  physical Therapy EVALUATION    Patient: Ronit Hernandez (56 y.o. male)  Date: 2/6/2018  Primary Diagnosis: C. difficile colitis  Precautions: Contact, Skin, Fall    ASSESSMENT :  Patient requires between minimal assistance/contact guard assist and supervision/set-up for bed mobility, transfers and ambulation. Supervision for bed mobility. Seated EOB with good balance. BLE strength 4/5. Min A for sit to stand. Upon first attempt, patient with loss of balance posterior and required sitting back in bed to correct. Sit to stand second trial with improved balance on initial standing. Amb 6ft with min A. Unsteady gait. Returned to supine in bed. Patient hesitant for mobility d/t bowel incontinence. Reports lives with brother in 3 story home and independent for amb. Educated on role of PT and plan of care. Patient presents with deficits in:  Bed Mobility, Transfers, Gait, Strength, Balance and Stairs    Patient will benefit from skilled intervention to address the above impairments.   Patients rehabilitation potential is considered to be Fair  Factors which may influence rehabilitation potential include:   []         None noted  []         Mental ability/status  [x]         Medical condition  []         Home/family situation and support systems  []         Safety awareness  []         Pain tolerance/management  []         Other:      PLAN :  Recommendations and Planned Interventions:  [x]           Bed Mobility Training             [x]    Neuromuscular Re-Education  [x]           Transfer Training                   []    Orthotic/Prosthetic Training  [x]           Gait Training                          []    Modalities  [x]           Therapeutic Exercises          []    Edema Management/Control  [x]           Therapeutic Activities            [x]    Patient and Family Training/Education  []           Other (comment):    Frequency/Duration: Patient will be followed by physical therapy 3-5 times a week to address goals. Discharge Recommendations: Home Health  Further Equipment Recommendations for Discharge: rolling walker     SUBJECTIVE:   Agreeable to PT    OBJECTIVE DATA SUMMARY:     Past Medical History:   Diagnosis Date    Anxiety     Diabetes ([facility] [address]     Ill-defined condition     hypotension    Psychiatric disorder     depression   History reviewed. No pertinent surgical history. Barriers to Learning/Limitations: None  Compensate with: visual, verbal, tactile, kinesthetic cues/model    G CODES:Mobility  Current  CL= 60-79%   Goal  CI= 1-19%. The severity rating is based on the [employer] Balance Scale 1+/5    Eval Complexity: History: MEDIUM  Complexity : 1-2 comorbidities / personal factors will impact the outcome/ POC Exam:MEDIUM Complexity : 3 Standardized tests and measures addressing body structure, function, activity limitation and / or participation in recreation  Presentation: MEDIUM Complexity : Evolving with changing characteristics  Clinical Decision Making:Medium Complexity [facility] Standing Balance Scale 1+/5 Overall Complexity:MEDIUM    [facility] Sitting Balance Scale 3+/5  0: Pt performs 25% or less of sitting activity (Max assist) CN, 100% impaired.   1: Pt supports self with upper extremities but requires therapist assistance. Pt performs 25-50% of effort (Mod assist) CM, 80% to <100% impaired. 1+: Pt supports self with upper extremities but requires therapist assistance. Pt performs >50% effort. (Min assist). CL, 60% to <80% impaired. 2: Pt supports self independently with both upper extremities. CL, 60% to <80% impaired. 2+: Pt support self independently with 1 upper extremity. CK, 40% to <60% impaired. 3: Pt sits without upper extremity support for up to 30 seconds. CK, 40% to <60% impaired. 3+: Pt sits without upper extremity support for 30 seconds or greater. CJ, 20% to <40% impaired. 4: Pt moves and returns trunkal midpoint 1-2 inches in one plane. CJ, 20% to <40% impaired. 4+: Pt moves and returns trunkal midpoint 1-2 inches in multiple planes. CI, 1% to <20% impaired. 5: Pt moves and returns trunkal midpoint in all planes greater than 2 inches. CH, 0% impaired. [address] Standing Balance Scale 1+/5  0: Pt performs 25% or less of standing activity (Max assist) CN, 100% impaired. 1: Pt supports self with upper extremities but requires therapist assistance. Pt performs 25-50% of effort (Mod assist) CM, 80% to <100% impaired. 1+: Pt supports self with upper extremities but requires therapist assistance. Pt performs >50% effort. (Min assist). CL, 60% to <80% impaired. 2: Pt supports self independently with both upper extremities (walker, crutches, parallel bars). CL, 60% to <80% impaired. 2+: Pt support self independently with 1 upper extremity (cane, crutch, 1 parallel bar). CK, 40% to <60% impaired. 3: Pt stands without upper extremity support for up to 30 seconds. CK, 40% to <60% impaired. 3+: Pt stands without upper extremity support for 30 seconds or greater. CJ, 20% to <40% impaired. 4: Pt independently moves and returns center of gravity 1-2 inches in one plane. CJ, 20% to <40% impaired.   4+: Pt independently moves and returns center of gravity 1-2 inches in multiple planes. CI, 1% to <20% impaired. 5: Pt independently moves and returns center of gravity in all planes greater than 2 inches. CH, 0% impaired. Prior Level of Function/Home Situation:   Home Situation  Home Environment: Private residence  One/Two Story Residence: Other (Comment) (3 story)  Living Alone: No  Support Systems: Family member(s)  Patient Expects to be Discharged to[de-identified] Private residence  Current DME Used/Available at Home: None  Critical Behavior:  Neurologic State: Alert  Orientation Level: Oriented to person;Oriented to place;Oriented to situation  Cognition: Follows commands  Safety/Judgement: Awareness of environment; Fall prevention  Psychosocial  Patient Behaviors: Cooperative  Strength:    Strength: Generally decreased, functional  Manual Muscle Testing (LE)         R     L    Hip Flexion:   4/5 4/5    Knee EXT:   4/5 4/5  Knee FLEX:   4/5 4/5      Ankle DF:   4/5 4/5  _________________________________________________   Tone & Sensation:   Tone: Normal  Sensation: Intact  Range Of Motion:  AROM: Within functional limits  Functional Mobility:  Bed Mobility:  Supine to Sit: Supervision  Sit to Supine: Supervision  Transfers:  Sit to Stand: Minimum assistance  Stand to Sit: Minimum assistance  Balance:   Sitting: Intact  Standing: Impaired  Standing - Static: Fair  Standing - Dynamic : Poor  Ambulation/Gait Training:  Distance (ft): 6 Feet (ft)  Assistive Device:  (none)  Ambulation - Level of Assistance: Minimal assistance  Gait Description (WDL): Exceptions to WDL  Pain:  Pre treatment pain level: 0  Post treatment pain level: 0  Pain Scale 1: Numeric (0 - 10)  Pain Intensity 1: 0  Activity Tolerance:   Fair  Please refer to the flowsheet for vital signs taken during this treatment.   After treatment:   []         Patient left in no apparent distress sitting up in chair  [x]         Patient left in no apparent distress in bed  [x]         Call bell left within reach  [x]         Nursing notified  []         Caregiver present  []         Bed alarm activated    COMMUNICATION/EDUCATION:   [x]         Fall prevention education was provided and the patient/caregiver indicated understanding. [x]         Patient/family have participated as able in goal setting and plan of care. [x]         Patient/family agree to work toward stated goals and plan of care. []         Patient understands intent and goals of therapy, but is neutral about his/her participation. []         Patient is unable to participate in goal setting and plan of care. Patient educated on the role of physical therapy during the acute stay  and the importance of mobility. JH.       Thank you for this referral.  Nette Ramon, PT   Time Calculation: 10 mins

## 2018-02-06 NOTE — PROGRESS NOTES
Report received from ICU nurse Stacy SHAW. Patient received via stretcher transport, noted to be awake and alert x 4 and is able to make all of his needs known at this time. Patient was noted with non working PIV to the left ac, IV removed with new 20 G IV access placed to the left ac. Patient noted on room air, noted on isolation precautions for positive c-diff. Patient is noted with skin intact, Mepilex placed to elbows, sacral and heels, for preventative measures. Prevalon boots noted. Patient currently c/o zero pain and is noted with zero s/s of distress. Bedside and Verbal shift change report given to Nadia SHAW (oncoming nurse) by Cosme Jain RN (offgoing nurse). Report included the following information SBAR, ED Summary, OR Summary, Procedure Summary, Intake/Output, MAR, Recent Results, Med Rec Status and Cardiac Rhythm NSR.

## 2018-02-06 NOTE — PROGRESS NOTES
Pt's brother called this yesterday p.m. And wanted pt to be posted to area snf's in [facility] and [address] [place] [address] also. I called brother now and he is coming in at 67 and would like me to meet him there.

## 2018-02-06 NOTE — PROGRESS NOTES
0730 Report received from [facility] OSKAR FIORE. Patient received in bed, noted to be awake and alert x 4 and is able to make all of his needs known at this time. Patient noted on room air, noted on isolation precautions for positive c-diff. Patient is noted with skin intact, Mepilex placed to elbows, sacral and heels, for preventative measures. Prevalon boots noted. Patient currently c/o zero pain and is noted with zero s/s of distress. Patient was noted with blood sugar 36, Potassium noted 3.0, MD Mark Kearney called with new orders, glucagon IM given, recheck was 81. Dextrose 5 with 1/2 NS started at 50 cc /hr. Patient shows zero distress. Patient scheduled for Barium swallow evaluation, anxiety noted, IV Ativan given prior to procedure. Patient was noted with blood sugar 305, MD notified with orders to hold Humalog dose and decrease dextrose to 10 cc/hr. Patient is noted with zero distress. Patient was noted with blood sugar 307, MD notified with orders to give 2 units of Humalog and keep Dextrose at 10 cc/hr. Patient shows zero distress. Oncoming nurse notified to keep blood sugars between 100-200 and to titrate Dextrose fluids. Oncoming nurse notified to hold Humalog dose for anything below 300 per MD Mark Kearney. Bedside and Verbal shift change report given to Robert Lee (oncoming nurse) by Trip Robertson RN (offgoing nurse). Report included the following information SBAR, ED Summary, Procedure Summary, Intake/Output, MAR, Recent Results, Med Rec Status and Cardiac Rhythm NSR.

## 2018-02-06 NOTE — PROGRESS NOTES
conducted an initial consultation and Spiritual Assessment for Lorenzo Vargas, who is a 56 y. o.,male. Patients Primary Language is: [place]. According to the patients EMR Protestant Affiliation is: Confucianism. The reason the Patient came to the hospital is:   Patient Active Problem List    Diagnosis Date Noted    C. difficile colitis 02/03/2018    Traumatic brain injury ([facility] [address] 02/03/2018    IDDM (insulin dependent diabetes mellitus) ([facility] 02/03/2018    Parkinson's disease ([facility] 02/03/2018    Psychiatric disorder 02/03/2018        The  provided the following Interventions:  Initiated a relationship of care and support. Explored issues of kayla, spirituality and/or Confucianist needs while hospitalized. Listened empathically. Provided chaplaincy education. Provided information about Spiritual Care Services. Offered prayer and assurance of continued prayers on patient's behalf. Chart reviewed. The following outcomes were achieved:  Patient shared some information about their medical narrative and spiritual journey/beliefs. Patient processed feeling about current hospitalization. Patient expressed gratitude for the 's visit. Assessment:  Patient did not indicate any spiritual or Confucianist issues which require Spiritual Care Services interventions at this time. Patient does not have any Confucianist/cultural needs that will affect patients preferences in health care. Plan:  Chaplains will continue to follow and will provide pastoral care on an as needed or requested basis.  recommends bedside caregivers page  on duty if patient shows signs of acute spiritual or emotional distress.     [address] [facility]   Staff [address] [facility]   [phone]

## 2018-02-07 LAB
ALBUMIN SERPL-MCNC: 1.7 G/DL (ref 3.4–5)
ALBUMIN/GLOB SERPL: 0.7 {RATIO} (ref 0.8–1.7)
ALP SERPL-CCNC: 56 U/L (ref 45–117)
ALT SERPL-CCNC: 13 U/L (ref 16–61)
ANION GAP SERPL CALC-SCNC: 12 MMOL/L (ref 3–18)
AST SERPL-CCNC: 10 U/L (ref 15–37)
BASOPHILS # BLD: 0.1 K/UL (ref 0–0.06)
BASOPHILS NFR BLD: 2 % (ref 0–2)
BILIRUB SERPL-MCNC: 0.4 MG/DL (ref 0.2–1)
BUN SERPL-MCNC: 6 MG/DL (ref 7–18)
BUN/CREAT SERPL: 12 (ref 12–20)
CALCIUM SERPL-MCNC: 7.6 MG/DL (ref 8.5–10.1)
CHLORIDE SERPL-SCNC: 106 MMOL/L (ref 100–108)
CO2 SERPL-SCNC: 24 MMOL/L (ref 21–32)
CORTIS AM PEAK SERPL-MCNC: 13.7 UG/DL (ref 4.3–22.4)
CREAT SERPL-MCNC: 0.52 MG/DL (ref 0.6–1.3)
DIFFERENTIAL METHOD BLD: ABNORMAL
EOSINOPHIL # BLD: 0.2 K/UL (ref 0–0.4)
EOSINOPHIL NFR BLD: 4 % (ref 0–5)
ERYTHROCYTE [DISTWIDTH] IN BLOOD BY AUTOMATED COUNT: 15.3 % (ref 11.6–14.5)
GLOBULIN SER CALC-MCNC: 2.3 G/DL (ref 2–4)
GLUCOSE BLD STRIP.AUTO-MCNC: 102 MG/DL (ref 70–110)
GLUCOSE BLD STRIP.AUTO-MCNC: 211 MG/DL (ref 70–110)
GLUCOSE BLD STRIP.AUTO-MCNC: 408 MG/DL (ref 70–110)
GLUCOSE BLD STRIP.AUTO-MCNC: 450 MG/DL (ref 70–110)
GLUCOSE SERPL-MCNC: 347 MG/DL (ref 74–99)
HCT VFR BLD AUTO: 33.5 % (ref 36–48)
HGB BLD-MCNC: 10.8 G/DL (ref 13–16)
LYMPHOCYTES # BLD: 1.8 K/UL (ref 0.9–3.6)
LYMPHOCYTES NFR BLD: 35 % (ref 21–52)
MCH RBC QN AUTO: 29.4 PG (ref 24–34)
MCHC RBC AUTO-ENTMCNC: 32.2 G/DL (ref 31–37)
MCV RBC AUTO: 91.3 FL (ref 74–97)
MONOCYTES # BLD: 0.4 K/UL (ref 0.05–1.2)
MONOCYTES NFR BLD: 7 % (ref 3–10)
NEUTS SEG # BLD: 2.8 K/UL (ref 1.8–8)
NEUTS SEG NFR BLD: 52 % (ref 40–73)
PLATELET # BLD AUTO: 286 K/UL (ref 135–420)
PMV BLD AUTO: 10.4 FL (ref 9.2–11.8)
POTASSIUM SERPL-SCNC: 4.3 MMOL/L (ref 3.5–5.5)
PROT SERPL-MCNC: 4 G/DL (ref 6.4–8.2)
RBC # BLD AUTO: 3.67 M/UL (ref 4.7–5.5)
SODIUM SERPL-SCNC: 142 MMOL/L (ref 136–145)
WBC # BLD AUTO: 5.2 K/UL (ref 4.6–13.2)

## 2018-02-07 PROCEDURE — 36415 COLL VENOUS BLD VENIPUNCTURE: CPT | Performed by: HOSPITALIST

## 2018-02-07 PROCEDURE — 65660000000 HC RM CCU STEPDOWN

## 2018-02-07 PROCEDURE — 74011250637 HC RX REV CODE- 250/637: Performed by: HOSPITALIST

## 2018-02-07 PROCEDURE — 85025 COMPLETE CBC W/AUTO DIFF WBC: CPT | Performed by: HOSPITALIST

## 2018-02-07 PROCEDURE — 82962 GLUCOSE BLOOD TEST: CPT

## 2018-02-07 PROCEDURE — 74011636637 HC RX REV CODE- 636/637: Performed by: HOSPITALIST

## 2018-02-07 PROCEDURE — 80053 COMPREHEN METABOLIC PANEL: CPT | Performed by: HOSPITALIST

## 2018-02-07 PROCEDURE — 92526 ORAL FUNCTION THERAPY: CPT

## 2018-02-07 PROCEDURE — 74011000250 HC RX REV CODE- 250: Performed by: HOSPITALIST

## 2018-02-07 PROCEDURE — 74011250636 HC RX REV CODE- 250/636

## 2018-02-07 PROCEDURE — 97535 SELF CARE MNGMENT TRAINING: CPT

## 2018-02-07 PROCEDURE — 97530 THERAPEUTIC ACTIVITIES: CPT

## 2018-02-07 PROCEDURE — 74011636637 HC RX REV CODE- 636/637: Performed by: INTERNAL MEDICINE

## 2018-02-07 PROCEDURE — 97166 OT EVAL MOD COMPLEX 45 MIN: CPT

## 2018-02-07 PROCEDURE — 74011250636 HC RX REV CODE- 250/636: Performed by: HOSPITALIST

## 2018-02-07 PROCEDURE — 82533 TOTAL CORTISOL: CPT | Performed by: HOSPITALIST

## 2018-02-07 RX ORDER — LORAZEPAM 2 MG/ML
INJECTION INTRAMUSCULAR
Status: COMPLETED
Start: 2018-02-07 | End: 2018-02-07

## 2018-02-07 RX ORDER — SODIUM CHLORIDE 9 MG/ML
500 INJECTION, SOLUTION INTRAVENOUS ONCE
Status: COMPLETED | OUTPATIENT
Start: 2018-02-07 | End: 2018-02-07

## 2018-02-07 RX ORDER — INSULIN LISPRO 100 [IU]/ML
8 INJECTION, SOLUTION INTRAVENOUS; SUBCUTANEOUS ONCE
Status: COMPLETED | OUTPATIENT
Start: 2018-02-07 | End: 2018-02-07

## 2018-02-07 RX ORDER — INSULIN LISPRO 100 [IU]/ML
INJECTION, SOLUTION INTRAVENOUS; SUBCUTANEOUS
Status: DISCONTINUED | OUTPATIENT
Start: 2018-02-07 | End: 2018-02-08 | Stop reason: HOSPADM

## 2018-02-07 RX ADMIN — PRAMIPEXOLE DIHYDROCHLORIDE 0.12 MG: 0.25 TABLET ORAL at 10:58

## 2018-02-07 RX ADMIN — VANCOMYCIN HYDROCHLORIDE 125 MG: 1 INJECTION, POWDER, LYOPHILIZED, FOR SOLUTION INTRAVENOUS at 11:10

## 2018-02-07 RX ADMIN — CITALOPRAM HYDROBROMIDE 40 MG: 20 TABLET ORAL at 10:57

## 2018-02-07 RX ADMIN — IPRATROPIUM BROMIDE AND ALBUTEROL SULFATE 3 ML: .5; 3 SOLUTION RESPIRATORY (INHALATION) at 12:45

## 2018-02-07 RX ADMIN — MODAFINIL 200 MG: 100 TABLET ORAL at 09:00

## 2018-02-07 RX ADMIN — INSULIN LISPRO 8 UNITS: 100 INJECTION, SOLUTION INTRAVENOUS; SUBCUTANEOUS at 13:39

## 2018-02-07 RX ADMIN — INSULIN DETEMIR 8 UNITS: 100 INJECTION, SOLUTION SUBCUTANEOUS at 10:55

## 2018-02-07 RX ADMIN — LORAZEPAM 1 MG: 2 INJECTION INTRAMUSCULAR; INTRAVENOUS at 18:30

## 2018-02-07 RX ADMIN — INSULIN LISPRO 6 UNITS: 100 INJECTION, SOLUTION INTRAVENOUS; SUBCUTANEOUS at 10:50

## 2018-02-07 RX ADMIN — SODIUM CHLORIDE 500 ML: 900 INJECTION, SOLUTION INTRAVENOUS at 01:00

## 2018-02-07 RX ADMIN — VANCOMYCIN HYDROCHLORIDE 125 MG: 1 INJECTION, POWDER, LYOPHILIZED, FOR SOLUTION INTRAVENOUS at 00:00

## 2018-02-07 RX ADMIN — VANCOMYCIN HYDROCHLORIDE 125 MG: 1 INJECTION, POWDER, LYOPHILIZED, FOR SOLUTION INTRAVENOUS at 06:32

## 2018-02-07 NOTE — PROGRESS NOTES
Pt has a UAI completed from [facility], his brother will bring it in tomorrow. I had discussion with brother and he would like pt 's snf posting to include long term care after snf.

## 2018-02-07 NOTE — PROGRESS NOTES
Hospitalist Progress Note  Denis Cordova MD  Internal medicine/ Hospitalist    Daily Progress Note: 2/7/2018 4:04 PM      Interval history / Subjective:   56 y.old male with h/o diabetes type I,anxiety,psychiatric issues,was brought to ER because of diarrhea,recurrent. C-diff PCR was positive. Patient has been on vanc po and gi has been following. Pt was recently at [facility] for head trauma with traumatic brain injury. Today patient reporting that he still has diarrhea but his brother here visiting reporting that he has been told that pt had only one bowel movement. Pt is on vanc po as per gi plan.     Current Facility-Administered Medications   Medication Dose Route Frequency    insulin detemir (LEVEMIR) injection 8 Units  8 Units SubCUTAneous Q12H    insulin lispro (HUMALOG) injection   SubCUTAneous AC&HS    dextrose 5 % - 0.45% NaCl infusion  50 mL/hr IntraVENous CONTINUOUS    albuterol-ipratropium (DUO-NEB) 2.5 MG-0.5 MG/3 ML  3 mL Nebulization Q6H PRN    influenza vaccine 2017-18 (3 yrs+)(PF) (FLUZONE QUAD/FLUARIX QUAD) injection 0.5 mL  0.5 mL IntraMUSCular PRIOR TO DISCHARGE    LORazepam (ATIVAN) injection 1 mg  1 mg IntraVENous BID PRN    sodium chloride (NS) flush 5-10 mL  5-10 mL IntraVENous PRN    citalopram (CELEXA) tablet 40 mg  40 mg Oral DAILY    modafinil (PROVIGIL) tablet 200 mg  200 mg Oral DAILY    nortriptyline (PAMELOR) capsule 150 mg  150 mg Oral DAILY WITH DINNER    pramipexole (MIRAPEX) tablet 0.125 mg  0.125 mg Oral TID    ALPRAZolam (XANAX) tablet 0.25 mg  0.25 mg Oral Q8H PRN    enoxaparin (LOVENOX) injection 40 mg  40 mg SubCUTAneous Q24H    glucose chewable tablet 16 g  4 Tab Oral PRN    glucagon (GLUCAGEN) injection 1 mg  1 mg IntraMUSCular PRN    dextrose (D50W) injection syrg 12.5-25 g  25-50 mL IntraVENous PRN    vancomycin 50 mg/mL oral solution (compounded) 125 mg  125 mg Oral Q6H    nortriptyline (PAMELOR) capsule 75 mg  75 mg Oral QHS        Review of Systems  Reporting diarrhea today    Objective:     Visit Vitals    /68 (BP 1 Location: Left arm, BP Patient Position: At rest)    Pulse 78    Temp 97.7 °F (36.5 °C)    Resp 20    Ht 5' 6\" (1.676 m)    Wt 56.3 kg (124 lb 1.6 oz)    SpO2 100%    BMI 20.03 kg/m2    O2 Flow Rate (L/min): 2 l/min O2 Device: Nasal cannula    Temp (24hrs), Av.2 °F (36.8 °C), Min:97.7 °F (36.5 °C), Max:98.9 °F (37.2 °C)      701 - 1900  In: 360 [P.O.:360]  Out: -   1901 -  07  In: 520.7 [P.O.:240; I.V.:280.7]  Out: 0 [Urine:700]   P/E  Pt is awake and is alert he has extrapyramidal lip smacking. Heart reg rate and rhythm   Lungs good breath sounds heard   Abdomen soft ad nontender   Neuro no cog wheelong noted . Is alert and oriented cranial nerves intact . Data Review    Recent Results (from the past 12 hour(s))   CBC WITH AUTOMATED DIFF    Collection Time: 18  4:56 AM   Result Value Ref Range    WBC 5.2 4.6 - 13.2 K/uL    RBC 3.67 (L) 4.70 - 5.50 M/uL    HGB 10.8 (L) 13.0 - 16.0 g/dL    HCT 33.5 (L) 36.0 - 48.0 %    MCV 91.3 74.0 - 97.0 FL    MCH 29.4 24.0 - 34.0 PG    MCHC 32.2 31.0 - 37.0 g/dL    RDW 15.3 (H) 11.6 - 14.5 %    PLATELET 161 566 - 573 K/uL    MPV 10.4 9.2 - 11.8 FL    NEUTROPHILS 52 40 - 73 %    LYMPHOCYTES 35 21 - 52 %    MONOCYTES 7 3 - 10 %    EOSINOPHILS 4 0 - 5 %    BASOPHILS 2 0 - 2 %    ABS. NEUTROPHILS 2.8 1.8 - 8.0 K/UL    ABS. LYMPHOCYTES 1.8 0.9 - 3.6 K/UL    ABS. MONOCYTES 0.4 0.05 - 1.2 K/UL    ABS. EOSINOPHILS 0.2 0.0 - 0.4 K/UL    ABS.  BASOPHILS 0.1 (H) 0.0 - 0.06 K/UL    DF AUTOMATED     METABOLIC PANEL, COMPREHENSIVE    Collection Time: 18  4:56 AM   Result Value Ref Range    Sodium 142 136 - 145 mmol/L    Potassium 4.3 3.5 - 5.5 mmol/L    Chloride 106 100 - 108 mmol/L    CO2 24 21 - 32 mmol/L    Anion gap 12 3.0 - 18 mmol/L    Glucose 347 (H) 74 - 99 mg/dL    BUN 6 (L) 7.0 - 18 MG/DL    Creatinine 0.52 (L) 0.6 - 1.3 MG/DL    BUN/Creatinine ratio 12 12 - 20      GFR est AA >60 >60 ml/min/1.73m2    GFR est non-AA >60 >60 ml/min/1.73m2    Calcium 7.6 (L) 8.5 - 10.1 MG/DL    Bilirubin, total 0.4 0.2 - 1.0 MG/DL    ALT (SGPT) 13 (L) 16 - 61 U/L    AST (SGOT) 10 (L) 15 - 37 U/L    Alk.  phosphatase 56 45 - 117 U/L    Protein, total 4.0 (L) 6.4 - 8.2 g/dL    Albumin 1.7 (L) 3.4 - 5.0 g/dL    Globulin 2.3 2.0 - 4.0 g/dL    A-G Ratio 0.7 (L) 0.8 - 1.7     CORTISOL, AM    Collection Time: 02/07/18  4:56 AM   Result Value Ref Range    Cortisol, a.m. 13.7 4.30 - 22.40 ug/dL   GLUCOSE, POC    Collection Time: 02/07/18 10:40 AM   Result Value Ref Range    Glucose (POC) 450 (HH) 70 - 110 mg/dL   GLUCOSE, POC    Collection Time: 02/07/18 12:19 PM   Result Value Ref Range    Glucose (POC) 408 (HH) 70 - 110 mg/dL         Assessment/Plan:     Principal Problem:    C. difficile colitis (2/3/2018)    Active Problems:    Traumatic brain injury ([facility] (2/3/2018)      IDDM (insulin dependent diabetes mellitus) ([facility] [address] (2/3/2018)      Parkinson's disease ([facility] [address] (2/3/2018)      Psychiatric disorder (2/3/2018)      Care Plan   1-C-diff diarrhea    -On vanc as per gi plan    -GI following  2-Traumatic brain injury    - PT/OT    - Rehab after here  3-Diabetes type I    -On levemir and ssi  4-Psych issues    -Continue current medications  DVT prophylaxis  Full code  Disposition:possibly 2/8 - rehab

## 2018-02-07 NOTE — PROGRESS NOTES
Dr Kateryna Anderson notified and made aware of [id number], see new order. IV D51/2NS stopped. Levemir insulin administered as ordered. Patient refused the sliding scale dose of insulin and given 6 units. Dr Kateryna Anderson aware. Patient reported feeling short of breath--RR=20 LS; Diminshed O2 sat on 2lpm nasal dppoaps=491%. Neb tx given Patient reports of feeling better. Continue to monitor. 16:25- Accucheck 211. Sliding scale insulin held as patient has poor appetite. No stools up to this time. 18:20- Patient c/o shortness of breath--RRT called-see notes. Ativan 1mg IV given during RRT- 18:45- Patient reports of feeling better and no c/o shortness of breath. Continue to monitor. Call light in reach. 18:59- Hourly rounding done throughout the shift.

## 2018-02-07 NOTE — ROUTINE PROCESS
1920- Assumed care of pt. Pt resting in bed. On room air. Contact precautions in place for C.diff. No c/o of pain. No distress noted. Call bell within reach. Will continue to monitor. 2050- Shift assessment complete. Pt's has a 22G IV in the left wrist that is not flushing. Supplies for new iv being obtained. 2110- 24G in the Left AC put in. Flushes well. D5-0.45NS infusing well. Will continue to monitor. 2226- due meds given. Pt lying in bed watching tv. No needs at this time. 0001- Made aware of pts. Blood pressure. Dr. Komal Rivera paged. 5002- Dr. Komal Rivera called back. 500ml bolus of normal saline ordered. Oral vanco given. 0100- 500ml NS hung    0525- pt assisted to restroom. Bowel and urine occurrence. 5903- oral vanco given. Bedside shift change report given to Ismael Walton (oncoming nurse) by Carolyne Gregg (offgoing nurse).  Report included the following information SBAR, Kardex, Intake/Output, MAR, Accordion, Recent Results and Cardiac Rhythm SR.

## 2018-02-07 NOTE — PROGRESS NOTES
NUTRITION follow-up/Plan of care    RECOMMENDATIONS:     1. Diabetic Consistent Carb diet; soft solids  2. SF CIB TID  3. Monitor weight, labs and PO intake  4. RD to follow    GOALS:     1. Ongoing: PO intake meets >75% of protein/calorie needs by 2/12  2. Met/Ongoing: Maintain weight/Gradual weight gain (1-2 lb by 2/12)    ASSESSMENT:     Weight status is classified as normal per BMI of 20. However, patient with significant unintentional weight loss of 17% over past 6 months. PO intake is poor. Continue SF CIB TID for additional calories/protein. Labs noted. BG range from  over past 24 hours. Being followed by glycemic control team. Nutrition recommendations listed. RD to follow. Meets Criteria for Chronic Malnutrition   [x] Severe Malnutrition, as evidenced by:   [] Severe muscle wasting, loss of subcutaneous fat   [x] Nutritional intake of <75% of recommended intake for >1 month   [x] Weight loss of  >5% in 1 month, >7.5% in 3 months, >10% in 6 months, >20% in 1 year   [] Severe edema     Nutrition Risk:  [x]   High []  Moderate [] Low    SUBJECTIVE/OBJECTIVE:     Transferred from ICU. Pt admitted with C diff colitis. PMHx includes T1DM, traumatic brain injury, Parkinson's disease, psychiatric disorder. 6 months ago his weight was 150 lb. He relates having reflux and it makes foods taste bad. Patient continues with poor appetite. Observed 25% intake of breakfast meal this morning and 100% intake of SF CIB supplement. States this is his baseline. Reviewed SLP recommendations for mech soft diet and thin liquids. Encouraged adequate intake to meet nutrition needs. Will monitor.      Information Obtained From:   [x] Chart Review  [x] Patient  [] Family/Caregiver  [] Nurse/Physician   [] Patient Rounds/Interdisciplinary Meeting    Diet: Mech Soft diet; thin liquids  Patient Vitals for the past 100 hrs:   % Diet Eaten   02/07/18 1810 75 %   02/07/18 1339 20 %   02/07/18 1046 20 %   02/05/18 1700 25 % 02/05/18 1200 50 %   02/05/18 0900 25 %     Medications: [x] Reviewed   Encounter Diagnoses     ICD-10-CM ICD-9-CM   1.  Diarrhea of presumed infectious origin A09 009.3     Past Medical History:   Diagnosis Date    Anxiety     Diabetes ([facility] [address]     Ill-defined condition     hypotension    Psychiatric disorder     depression     Labs:    Lab Results   Component Value Date/Time    Sodium 142 02/08/2018 04:58 AM    Potassium 3.2 (L) 02/08/2018 04:58 AM    Chloride 107 02/08/2018 04:58 AM    CO2 27 02/08/2018 04:58 AM    Anion gap 8 02/08/2018 04:58 AM    Glucose 29 (LL) 02/08/2018 04:58 AM    BUN 10 02/08/2018 04:58 AM    Creatinine 0.53 (L) 02/08/2018 04:58 AM    Calcium 7.9 (L) 02/08/2018 04:58 AM    Magnesium 1.9 02/06/2018 09:05 AM    Albumin 1.7 (L) 02/07/2018 04:56 AM     Anthropometrics: BMI (calculated): 20 Last 3 Recorded Weights in this Encounter    02/04/18 2321 02/06/18 0428 02/07/18 0520   Weight: 52.4 kg (115 lb 8.3 oz) 54.4 kg (120 lb) 56.3 kg (124 lb 1.6 oz)    Ht Readings from Last 1 Encounters:   02/07/18 5' 6\" (1.676 m)     [x]  Weight Loss (17% x 6 mths)  []  Weight Gain  []  Weight Stable   []  New wt n/a on record     Estimated Nutrition Needs:   1956 Kcals/day  Protein (g): 79 g    Nutrition Problems Identified:   [x] Suboptimal PO intake   [] Food Allergies  [x] Difficulty chewing/swallowing/poor dentition  [] Constipation/Diarrhea   [] Nausea/Vomiting   [] None  [] Other:     Plan:   [] Therapeutic Diet  [x]  Obtained/adjusted food preferences/tolerances and/or snacks options   [x]  Continue dietary supplements as prescribed   [x]  SLP following for feeding techniques  []  HS snack added   [x]  Modify diet texture   []  Modify diet for food allergies   []  Assist with menu selection   [x]  Monitor PO intake on meal rounds   [x]  Continue inpatient monitoring and intervention   []  Participated in discharge planning/Interdisciplinary rounds/Team meetings   []  Other:     Education Needs:   [] Not appropriate for teaching at this time due to:   [x] Identified and addressed    Nutrition Monitoring and Evaluation:   [x] Continue inpatient monitoring and interventions    [] Other:     Sky Mess

## 2018-02-07 NOTE — MANAGEMENT PLAN
Discharge Planning    1120:Care Management following. Discharge Plan is SNF rehab and likely transition to long term care. Multiple accepting facilities to choose from. Will verify acceptances and present choices to pt/family. Per notes, there is a [facility]  [phone]: Spoke with brother, Daniel Hansen. He has not been able to get [facility], but states he will  if needed. Accepting facility may be able to obtain as well. Plan is SNF rehab and likely long term care. He states if pt does really well where he would be safe back at home, he will go home with brother and sister in law. Brother states, he and wife are likely to go on cruise on Friday, but will cancel if need be  1530: Went over accepting facilities and who had accepted for transition to [facility]. He chose [facility]. He states he will go look at facility tomorrow. Brother and myself met with Dr Buck Esquivel.  If pt without diarrhea overnight and medically stable, he may d/c tomorrow      Sloan Orlando RN BSN  Outcomes Manager  Pager # [phone]

## 2018-02-07 NOTE — PROGRESS NOTES
1103: 1st PT attempt. Patient working with OT. Will follow up.   1404: 2nd PT attempt. Patient refusing d/t difficulty breathing. Saturation 99% on nasal cannula; respirations normal and unlabored. Carries on conversation with no shortness of breath noted. Call bell in reach. Will follow up.      Thank you,     Jordan Goodwin, PT, DPT

## 2018-02-07 NOTE — PROGRESS NOTES
Problem: Falls - Risk of  Goal: *Absence of Falls  Document Ernie Fall Risk and appropriate interventions in the flowsheet.    Outcome: Progressing Towards Goal  Fall Risk Interventions:  Mobility Interventions: Bed/chair exit alarm    Mentation Interventions: Adequate sleep, hydration, pain control, Door open when patient unattended    Medication Interventions: Patient to call before getting OOB, Teach patient to arise slowly    Elimination Interventions: Patient to call for help with toileting needs

## 2018-02-07 NOTE — PROGRESS NOTES
Problem: Self Care Deficits Care Plan (Adult)  Goal: *Acute Goals and Plan of Care (Insert Text)  Occupational Therapy Goals  Initiated 2/7/2018 within 7 day(s). 1.  Patient will perform grooming tasks while standing with modified independence. 2.  Patient will perform lower body dressing with modified independence. 3.  Patient will perform functional task in standing for 8 minutes with good dynamic standing balance to increase activity tolerance for ADLs. 4.  Patient will perform toilet transfers with modified independence. 5.  Patient will perform all aspects of toileting with modified independence. 6.  Patient will participate in upper extremity therapeutic exercise/activities with supervision for 8 minutes to increase BUE strength for functional transfers & ADLs. 7.  Patient will utilize energy conservation techniques during functional activities with minimal verbal cues. Outcome: Progressing Towards Goal  Occupational Therapy EVALUATION    Patient: Sylvia Oliveira (56 y.o. male)  Date: 2/7/2018  Primary Diagnosis: C. difficile colitis        Precautions:  Contact, Fall  PLOF: Pt was independent with most basic self care tasks and functional mobility PTA. ASSESSMENT :  Based on the objective data described below, the patient presents with impairments with regard to activity tolerance, BUE strength and independence in ADLs. Pt supine on arrival; brother at bedside. Pt lives with brother, independent with most ADLs; has assistance for all IADLs. Supervision for bed mobility; intact sitting balance. CGA for all functional transfers. Pt maneuvered to bathroom,had bowel movement; max A for pericare due to decreased LUE AROM and decreased balance. Skilled instruction for use of grab bar. Pt instructed for proper positioning of RW during all ADLs to ensure safety. Pt left up in chair with needs within reach. Pt very anxious t/o session, frequently stating \"I can't breathe\" (O2 98% on RA).  Pt much more calm (decreased RR) when redirected. Brother at bedside at end of session. Recommend continued therapy. Patient will benefit from skilled intervention to address the above impairments. Patients rehabilitation potential is considered to be Good  Factors which may influence rehabilitation potential include:   []             None noted  []             Mental ability/status  [x]             Medical condition  []             Home/family situation and support systems  []             Safety awareness  []             Pain tolerance/management  []             Other:     Recommendations for nursing: up with assist x1  Verbally communicated to: Paris Crowder RN       PLAN :  Recommendations and Planned Interventions:  [x]               Self Care Training                  [x]        Therapeutic Activities  [x]               Functional Mobility Training    []        Cognitive Retraining  [x]               Therapeutic Exercises           [x]        Endurance Activities  [x]               Balance Training                   []        Neuromuscular Re-Education  []               Visual/Perceptual Training     [x]   Home Safety Training  [x]               Patient Education                 [x]        Family Training/Education  []               Other (comment):    Frequency/Duration: Patient will be followed by occupational therapy 3-5 times a week to address goals. Discharge Recommendations: Home Health  Further Equipment Recommendations for Discharge: Anticipate none     SUBJECTIVE:   Patient stated I can't breathe.  (pt very anxious t/o session)    OBJECTIVE DATA SUMMARY:     Past Medical History:   Diagnosis Date    Anxiety     Diabetes ([facility] [address]     Ill-defined condition     hypotension    Psychiatric disorder     depression   History reviewed. No pertinent surgical history.   Barriers to Learning/Limitations: None  Compensate with: visual, verbal, tactile, kinesthetic cues/model    GCODES:  Self Care  Current  CJ= 20-39%   Goal  CI= 1-19%. The severity rating is based on the Other Functional Assessment, MMT, ROM    Eval Complexity: History: MEDIUM Complexity : Expanded review of history including physical, cognitive and psychosocial  history ; Examination: MEDIUM Complexity : 3-5 performance deficits relating to physical, cognitive , or psychosocial skils that result in activity limitations and / or participation restrictions; Decision Making:MEDIUM Complexity : Patient may present with comorbidities that affect occupational performnce. Miniml to moderate modification of tasks or assistance (eg, physical or verbal ) with assesment(s) is necessary to enable patient to complete evaluation     Prior Level of Function/Home Situation: Pt was independent with most basic self care tasks and functional mobility PTA. Home Situation  Home Environment: Private residence  One/Two Story Residence: Two story  # of Interior Steps: 12  Living Alone: No  Support Systems: Family member(s)  Patient Expects to be Discharged to[de-identified] Unknown  Current DME Used/Available at Home: Shower chair  Tub or Shower Type: Shower (has shower chair; no grab bars)  [x]  Right hand dominant   []  Left hand dominant    Cognitive/Behavioral Status:  Neurologic State: Alert  Orientation Level: Oriented X4  Cognition: Appropriate decision making; Appropriate safety awareness; Appropriate for age attention/concentration  Safety/Judgement: Awareness of environment; Fall prevention     Skin: Intact (BUEs)  Edema: None noted (BUEs)    Vision/Perceptual:    Acuity: Able to read clock/calendar on wall without difficulty      Coordination:  Fine Motor Skills-Upper: Right Intact; Left Intact    Gross Motor Skills-Upper: Right Intact; Left Intact     Balance:  Sitting: Intact  Standing: Impaired; With support  Standing - Static: Good  Standing - Dynamic : Fair  Strength:  Strength: Generally decreased, functional (BUEs: 4/5)    Tone & Sensation:  Tone: Normal (BUEs)  Sensation: Intact (BUEs)    Range of Motion:  AROM: Within functional limits (BUEs: full shoulder/elbow flexion)    Functional Mobility and Transfers for ADLs:  Bed Mobility:  Supine to Sit: Supervision  Sit to Supine:  (not assessed; pt left up in chair)    Transfers:  Sit to Stand: Contact guard assistance  Bed to Chair: Stand-by asssistance; Additional time   Toilet Transfer : Stand-by asssistance; Adaptive equipment    ADL Assessment:  Feeding: Setup;Modified independent  Oral Facial Hygiene/Grooming: Setup;Modified Independent  Bathing: Setup;Supervision  Upper Body Dressing: Modified independent  Lower Body Dressing: Setup;Supervision  Toileting: Maximum assistance    ADL Intervention:  Grooming  Grooming Assistance: Supervision/set up  Washing Hands: Supervision/set-up  Cues: Verbal cues provided (for RW positioning)    Toileting  Toileting Assistance: Maximum assistance  Bowel Hygiene: Maximum assistance  Clothing Management: Stand-by assistance  Cues: Physical assistance for pants up;Physical assistance for pants down;Verbal cues provided  Adaptive Equipment: Grab bars    SBA for toilet transfer; pt had bowel movement; max A for pericare due to decreased LUE AROM and fair dynamic sitting balance. Skilled instruction for use of grab bar. Pt instructed for proper positioning of RW during all ADLs to ensure safety. Cognitive Retraining  Safety/Judgement: Awareness of environment; Fall prevention    Therapeutic Activity:  Functional mobility from EOB-->bathroom-->chair with supervision/SBA, skilled instruction for breathing & relaxation techniques. Pain:  Pre treatment pain level: 0/10  Post treatment pain level: 0/10  Pain Scale 1: Numeric (0 - 10)  Pain Intensity 1: 0    Activity Tolerance: Fair  Please refer to the flowsheet for vital signs taken during this treatment.   After treatment:   [x] Patient left in no apparent distress sitting up in chair  [] Patient left in no apparent distress in bed  [x] Call bell left within reach  [x] Nursing notified  [x] Caregiver present  [] Bed alarm activated    COMMUNICATION/EDUCATION: Pt/brother educated on role of OT, POC, and home safety. They verbalized understanding. [x] Home safety education was provided and the patient/caregiver indicated understanding. [x] Patient/family have participated as able in goal setting and plan of care. [x] Patient/family agree to work toward stated goals and plan of care. [] Patient understands intent and goals of therapy, but is neutral about his/her participation. [] Patient is unable to participate in goal setting and plan of care.     Thank you for this referral.    Sixto Martínez MS OTR/L  Time Calculation: 40 mins

## 2018-02-07 NOTE — DIABETES MGMT
GLYCEMIC CONTROL AND NUTRITION    Assessment/Recommendations:  Fasting lab glucose this am 347 mg/dl  Patient was receiving Levemir 7 units every 12 hours, but had hypoglycemia yesterday morning and the levemir was discontinued. Blood glucose now 450 mg/dl  Noted Levemir restarted at 8 units every 12 hours. First dose already given at 1000. Continue corrective insulin coverage as needed. Will continue inpatient monitoring. Most recent blood glucose values:    Current A1C of 7.0 % is equivalent to average blood glucose of 154 mg/dl over the past 2-3 months.     Current hospital diabetes medications:   Lispro corrective insulin coverage AC&HS  Home diabetes medications: per dietitian note  Lantus 14 units every night  Novolog TID at meals, no dose stated  Diet:    Diabetic consistent carbs soft solids  supplements    Neo Esquivel RN

## 2018-02-07 NOTE — PROGRESS NOTES
Progress Note      Patient: Shashi Cheek               Sex: male          DOA: 2/3/2018       YOB: 1960      Age:  56 y.o.        LOS:  LOS: 3 days               Subjective:   Help of gi appreciated. the pt has recurrent C difficile infection and is  To be put on the schedule of vancomycin as outlined by gi . The pt also has episodes of hypoglycemia as well as hypotension . need to assess the possibility he may be adrenally insufficient . A cosyntropin test will need to be done . A cortisol level will be ordered for the am the pt has a complicatred history and was in the hospital at [address] recently he had a traumatic brain njury and he has parkinsons dsiease as well as a psychiatric disorder he has lip smacking probably from his antipsychotic drugs . The severe episodes of hypoglycemia will need to be evaluated the patient said thta the has had this all his life and his brother has the same problem . Objective:      Visit Vitals    BP (!) 86/42 (BP 1 Location: Right arm, BP Patient Position: At rest)    Pulse 89    Temp 98.2 °F (36.8 °C)    Resp 20    Ht 5' 6\" (1.676 m)    Wt 54.4 kg (120 lb)    SpO2 99%    BMI 19.37 kg/m2       Physical Exam:  Pt is awake and is alert he has extrapyramidal lip smacking. Heart reg rate and rhythm   Lungs good breath sounds heard   Abdomen soft ad nontender   Neuro no cog wheelong noted . Is alert and oriented cranial nerves intact .          Lab/Data Reviewed:  BMP:   Lab Results   Component Value Date/Time     02/06/2018 04:08 AM    K 3.0 (L) 02/06/2018 04:08 AM     (H) 02/06/2018 04:08 AM    CO2 27 02/06/2018 04:08 AM    AGAP 9 02/06/2018 04:08 AM    GLU 57 (L) 02/06/2018 04:08 AM    BUN 6 (L) 02/06/2018 04:08 AM    CREA 0.53 (L) 02/06/2018 04:08 AM    GFRAA >60 02/06/2018 04:08 AM    GFRNA >60 02/06/2018 04:08 AM     CMP:   Lab Results   Component Value Date/Time     02/06/2018 04:08 AM    K 3.0 (L) 02/06/2018 04:08 AM     (H) 02/06/2018 04:08 AM    CO2 27 02/06/2018 04:08 AM    AGAP 9 02/06/2018 04:08 AM    GLU 57 (L) 02/06/2018 04:08 AM    BUN 6 (L) 02/06/2018 04:08 AM    CREA 0.53 (L) 02/06/2018 04:08 AM    GFRAA >60 02/06/2018 04:08 AM    GFRNA >60 02/06/2018 04:08 AM    CA 7.9 (L) 02/06/2018 04:08 AM    MG 1.9 02/06/2018 09:05 AM    ALB 1.7 (L) 02/06/2018 04:08 AM    TP 4.0 (L) 02/06/2018 04:08 AM    GLOB 2.3 02/06/2018 04:08 AM    AGRAT 0.7 (L) 02/06/2018 04:08 AM    SGOT 10 (L) 02/06/2018 04:08 AM    ALT 11 (L) 02/06/2018 04:08 AM     CBC:   Lab Results   Component Value Date/Time    WBC 7.6 02/06/2018 04:08 AM    HGB 10.5 (L) 02/06/2018 04:08 AM    HCT 31.6 (L) 02/06/2018 04:08 AM     02/06/2018 04:08 AM           Assessment/Plan     Principal Problem:    C. difficile colitis (2/3/2018) recurrent . Active Problems:    Traumatic brain injury ([facility] [address] (2/3/2018)      IDDM (insulin dependent diabetes mellitus) ([facility] [address] (2/3/2018)      Parkinson's disease ([facility] [address] (2/3/2018)      Psychiatric disorder (2/3/2018)  Hypoglycemia ,hypotension . R/o adrenal insufficiency       Plan:continue the iv of d5 ns to keep the blood glucose 100 or higher . .needs a cortisol level .

## 2018-02-07 NOTE — PROGRESS NOTES
IDR Summary      Patient: Christopher Swann MRN: [id number]    Age: 56 y.o.  : 1960     Admit Diagnosis: C. difficile colitis      Problems pertinent to hospital stay: recurrent C-diff- GI consulted- possible fecal transplant  Issues with blood glucose, as high as 450, started on Levemir- pt and family refusing scheduled dose of sliding scale of insulin     Consults:P.T, O.T., Speech and Case Management     Testing due for patient today? YES    Nutrition plan:Yes     Mobility needs: Yes      Lines/Tubes:   IV: YES   Needed: YES  Andujar: NO  Needed:NO  Central Line: NO Needed: NO      VTE Prophylaxis: Chemical    Influenza Vaccine received? YES        Care Management:  Discharge plan: SNF   PCP: None    : NO  Financial concerns:No   Interventions:       LOS: 4 days     Expected days until discharge: TBD       Signed:      DARLIN Crockett  [facility]  Hospitalist Division  Pager:  [phone]  Office:  [phone]

## 2018-02-07 NOTE — PROGRESS NOTES
Problem: Dysphagia (Adult)  Goal: *Acute Goals and Plan of Care (Insert Text)  Recommendations:  Diet: mech-soft/thin (NO STRAWS)  Meds: one at a time   Aspiration Precautions  Oral Care TID  Other: may benefit from MBS once medically stable    Goals:  Patient will:  1. Tolerate PO trials with 0 s/s overt distress in 4/5 trials - goal met  2. Utilize compensatory swallow strategies/maneuvers (decrease bite/sip, size/rate, alt. liq/sol) with min cues in 4/5 trials - goal met  3. Perform oral-motor/laryngeal exercises to increase oropharyngeal swallow function with min cues - not indicated  4. Complete an objective swallow study (i.e., MBSS) to assess swallow integrity, r/o aspiration, and determine of safest LRD, min A - completed 2/6/18        Outcome: Resolved/Met Date Met: 02/07/18  Speech language pathology dysphagia treatment & discharge    Patient: Steffi Alvarado (89 y.o. male)  Date: 2/7/2018  Diagnosis: C. difficile colitis C. difficile colitis       Precautions:  Contact, Skin, Fall    ASSESSMENT:  Follow up this am with pt again requesting \"regular food\". After discussing this, pt reported having beef for dinner last night; denied difficulty with po intake. Observed pt with mech-soft/thin liquid breakfast tray with labored oral bolus prep and transit; however, with increased time, 100% oral clearance appreciated. No aspiration s/s noted. Maximum therapeutic gains met; safest, least restrictive diet achieved in current in-patient/acute setting. Accordingly, SLP to d/c intervention at this time. Pt safest for mech-soft/thin liquids (no straws). Progression toward goals:  [x]         Improving appropriately - goals met/approximated  []         Not making progress/Not appropriate - will d/c POC     PLAN:  Recommendations and Planned Interventions:  Maximum therapeutic gains met; safest, least restrictive diet achieved. D/C ST intervention at this time.    Discharge Recommendations:  None     SUBJECTIVE: Patient stated i'm fine. OBJECTIVE:   Cognitive and Communication Status:  Neurologic State: Alert  Orientation Level: Oriented X4  Cognition: Follows commands  Perception: Appears intact  Perseveration: No perseveration noted  Safety/Judgement: Awareness of environment, Fall prevention  Dysphagia Treatment:  Oral Assessment:  Oral Assessment  Labial: Decreased rate, Decreased seal, Right droop  Dentition: Limited  Oral Hygiene: Good  Lingual: Decreased rate, Decreased strength  Velum: No impairment  Mandible: No impairment  P.O. Trials:   Patient Position: [facility]   Vocal quality prior to P.O.: Low volume   Consistency Presented: Thin liquid, Solid, Pudding   How Presented: Self-fed/presented, SLP-fed/presented, Straw, Successive swallows       Bolus Acceptance: No impairment   Bolus Formation/Control: Impaired   Type of Impairment: Delayed, Mastication   Propulsion: Delayed (# of seconds)   Oral Residue: None   Initiation of Swallow: Delayed (# of seconds)   Laryngeal Elevation: Decreased   Aspiration Signs/Symptoms: Weak cough   Pharyngeal Phase Characteristics: Poor endurance, Suspected pharyngeal residue   Effective Modifications: Small sips and bites   Cues for Modifications:  Moderate         Oral Phase Severity: Mild   Pharyngeal Phase Severity : Mild           PAIN:  Start of Tx: 0  End of Tx: 0     GCODESwallowing:  Swallow Goal Status CJ= 20-39%   Swallow D/C Status CJ= 20-39%    The severity rating is based on the following outcomes:  BRADY Noms Swallow Level 5    Clinical Judgement    After treatment:   []              Patient left in no apparent distress sitting up in chair  [x]              Patient left in no apparent distress in bed  [x]              Call bell left within reach  [x]              Nursing notified  []              Caregiver present  []              Bed alarm activated      COMMUNICATION/EDUCATION:   [x] Aspiration precautions; swallow safety; compensatory techniques  [] Patient unable to participate in education; education ongoing with staff  []  Posted safety precautions in patient's room.   [] Oral-motor/laryngeal strengthening exercises    HUGO Matos  Time Calculation: 15 mins

## 2018-02-07 NOTE — ROUTINE PROCESS
Pt is Diabetic, receiving Insulin Lispro and Insulin Levemir. Placed orders for POC Glucose Accuchecks AC&HS per protocol. Notified primary nurse Suleiman Maldonado RN and Dr Bowen Shabazz.

## 2018-02-07 NOTE — PROGRESS NOTES
RRT responded to Rapid Response. Patient c/o  SOB. Oxyen Saturation 99%. Hospitalist and  Nurses are at bedside.

## 2018-02-08 VITALS
BODY MASS INDEX: 19.93 KG/M2 | HEART RATE: 84 BPM | HEIGHT: 66 IN | TEMPERATURE: 97.5 F | SYSTOLIC BLOOD PRESSURE: 120 MMHG | WEIGHT: 124 LBS | DIASTOLIC BLOOD PRESSURE: 52 MMHG | RESPIRATION RATE: 18 BRPM | OXYGEN SATURATION: 99 %

## 2018-02-08 LAB
ANION GAP SERPL CALC-SCNC: 8 MMOL/L (ref 3–18)
BUN SERPL-MCNC: 10 MG/DL (ref 7–18)
BUN/CREAT SERPL: 19 (ref 12–20)
CALCIUM SERPL-MCNC: 7.9 MG/DL (ref 8.5–10.1)
CHLORIDE SERPL-SCNC: 107 MMOL/L (ref 100–108)
CO2 SERPL-SCNC: 27 MMOL/L (ref 21–32)
CREAT SERPL-MCNC: 0.53 MG/DL (ref 0.6–1.3)
GLUCOSE BLD STRIP.AUTO-MCNC: 149 MG/DL (ref 70–110)
GLUCOSE BLD STRIP.AUTO-MCNC: 220 MG/DL (ref 70–110)
GLUCOSE BLD STRIP.AUTO-MCNC: 248 MG/DL (ref 70–110)
GLUCOSE BLD STRIP.AUTO-MCNC: 37 MG/DL (ref 70–110)
GLUCOSE SERPL-MCNC: 29 MG/DL (ref 74–99)
POTASSIUM SERPL-SCNC: 3.2 MMOL/L (ref 3.5–5.5)
SODIUM SERPL-SCNC: 142 MMOL/L (ref 136–145)

## 2018-02-08 PROCEDURE — 74011636637 HC RX REV CODE- 636/637: Performed by: INTERNAL MEDICINE

## 2018-02-08 PROCEDURE — 80048 BASIC METABOLIC PNL TOTAL CA: CPT | Performed by: INTERNAL MEDICINE

## 2018-02-08 PROCEDURE — 82962 GLUCOSE BLOOD TEST: CPT

## 2018-02-08 PROCEDURE — 74011250636 HC RX REV CODE- 250/636: Performed by: HOSPITALIST

## 2018-02-08 PROCEDURE — 77030020263 HC SOL INJ SOD CL0.9% LFCR 1000ML

## 2018-02-08 PROCEDURE — 74011250637 HC RX REV CODE- 250/637: Performed by: HOSPITALIST

## 2018-02-08 PROCEDURE — 36415 COLL VENOUS BLD VENIPUNCTURE: CPT | Performed by: INTERNAL MEDICINE

## 2018-02-08 RX ORDER — CITALOPRAM 40 MG/1
40 TABLET, FILM COATED ORAL DAILY
Qty: 10 TAB | Refills: 0 | Status: SHIPPED | OUTPATIENT
Start: 2018-02-08

## 2018-02-08 RX ORDER — DEXTROSE MONOHYDRATE 25 G/50ML
INJECTION, SOLUTION INTRAVENOUS
Status: DISCONTINUED
Start: 2018-02-08 | End: 2018-02-08 | Stop reason: HOSPADM

## 2018-02-08 RX ORDER — SODIUM CHLORIDE 9 MG/ML
100 INJECTION, SOLUTION INTRAVENOUS CONTINUOUS
Status: DISCONTINUED | OUTPATIENT
Start: 2018-02-08 | End: 2018-02-08 | Stop reason: HOSPADM

## 2018-02-08 RX ORDER — INSULIN GLARGINE 100 [IU]/ML
4 INJECTION, SOLUTION SUBCUTANEOUS
Status: DISCONTINUED | OUTPATIENT
Start: 2018-02-08 | End: 2018-02-08 | Stop reason: HOSPADM

## 2018-02-08 RX ORDER — ALPRAZOLAM 0.25 MG/1
0.25 TABLET ORAL
Qty: 10 TAB | Refills: 0 | Status: ON HOLD | OUTPATIENT
Start: 2018-02-08 | End: 2018-03-03

## 2018-02-08 RX ADMIN — NORTRIPTYLINE HYDROCHLORIDE 75 MG: 25 CAPSULE ORAL at 00:03

## 2018-02-08 RX ADMIN — VANCOMYCIN HYDROCHLORIDE 125 MG: 1 INJECTION, POWDER, LYOPHILIZED, FOR SOLUTION INTRAVENOUS at 13:02

## 2018-02-08 RX ADMIN — PRAMIPEXOLE DIHYDROCHLORIDE 0.12 MG: 0.25 TABLET ORAL at 00:05

## 2018-02-08 RX ADMIN — INSULIN LISPRO 4 UNITS: 100 INJECTION, SOLUTION INTRAVENOUS; SUBCUTANEOUS at 09:03

## 2018-02-08 RX ADMIN — INSULIN LISPRO 4 UNITS: 100 INJECTION, SOLUTION INTRAVENOUS; SUBCUTANEOUS at 13:01

## 2018-02-08 RX ADMIN — MODAFINIL 200 MG: 100 TABLET ORAL at 09:05

## 2018-02-08 RX ADMIN — VANCOMYCIN HYDROCHLORIDE 125 MG: 1 INJECTION, POWDER, LYOPHILIZED, FOR SOLUTION INTRAVENOUS at 00:07

## 2018-02-08 RX ADMIN — GLUCAGON HYDROCHLORIDE 1 MG: KIT at 05:23

## 2018-02-08 RX ADMIN — SODIUM CHLORIDE 100 ML/HR: 900 INJECTION, SOLUTION INTRAVENOUS at 05:14

## 2018-02-08 RX ADMIN — ENOXAPARIN SODIUM 40 MG: 40 INJECTION SUBCUTANEOUS at 00:03

## 2018-02-08 NOTE — DISCHARGE SUMMARY
Discharge Summary    Patient: Andria Hall               Sex: male          DOA: 2/3/2018         YOB: 1960      Age:  56 y.o.        LOS:  LOS: 5 days                Admit Date: 2/3/2018    Discharge Date: 2/8/2018    Admission Diagnoses: C. difficile colitis    Discharge Diagnoses:    Problem List as of 2/8/2018  Date Reviewed: 2/3/2018          Codes Class Noted - Resolved    * (Principal)C. difficile colitis ICD-10-CM: A04.72  ICD-9-CM: 008.45  2/3/2018 - Present        Traumatic brain injury [facility]) ICD-10-CM: S06. 9X9A  ICD-9-CM: 854.00  2/3/2018 - Present        IDDM (insulin dependent diabetes mellitus) ([facility] ICD-10-CM: E11.9, Z79.4  ICD-9-CM: 250.00, V58.67  2/3/2018 - Present        Parkinson's disease ([facility] ICD-10-CM: Lonie Filter  ICD-9-CM: 332.0  2/3/2018 - Present        Psychiatric disorder ICD-10-CM: F99  ICD-9-CM: 300.9  2/3/2018 - Present    Hypotension,chronic            Discharge Medications:     Current Discharge Medication List      START taking these medications    Details   vancomycin 50 mg/mL oral solution (compounded) Take 2.5 mL by mouth every six (6) hours. 125 mg po qid x 12 days,then 125 mg po bid x 7 days;then 125 mg po daily x 7 days;then 125 mg po every other day x 7 days;then 125 mg po q 3 days x 14 days  Qty: 1 Bottle, Refills: 0         CONTINUE these medications which have CHANGED    Details   ALPRAZolam (XANAX) 0.25 mg tablet Take 1 Tab by mouth every eight (8) hours as needed for Anxiety. Max Daily Amount: 0.75 mg. Qty: 10 Tab, Refills: 0    Associated Diagnoses: Abdominal pain, generalized      citalopram (CELEXA) 40 mg tablet Take 1 Tab by mouth daily. Indications: ANXIETY WITH DEPRESSION  Qty: 10 Tab, Refills: 0         CONTINUE these medications which have NOT CHANGED    Details   modafinil (PROVIGIL) 200 mg tablet Take 200 mg by mouth daily. nortriptyline (PAMELOR) 75 mg capsule Take 150 mg by mouth nightly.  Indications: 150mg at dinner and 75mg at hs pramipexole (MIRAPEX) 0.125 mg tablet Take 0.125 mg by mouth three (3) times daily. Indications: IDIOPATHIC PARKINSONISM      insulin glargine (LANTUS) 100 unit/mL injection 14 Units by SubCUTAneous route nightly. insulin aspart (NOVOLOG) 100 unit/mL injection by SubCUTAneous route Before breakfast, lunch, dinner and at bedtime. Indications: type 1 diabetes mellitus             Follow-up:pcp    Discharge Condition:good    Activity:as tolerated    Diet:heart healthy    Disposition:SNF    Labs:  Labs: Results:       Chemistry Recent Labs      02/08/18 0458 02/07/18 0456 02/06/18 0408   GLU  29*  347*  57*   NA  142  142  145   K  3.2*  4.3  3.0*   CL  107  106  109*   CO2  27  24  27   BUN  10  6*  6*   CREA  0.53*  0.52*  0.53*   CA  7.9*  7.6*  7.9*   AGAP  8  12  9   BUCR  19  12  11*   AP   --   56  58   TP   --   4.0*  4.0*   ALB   --   1.7*  1.7*   GLOB   --   2.3  2.3   AGRAT   --   0.7*  0.7*      CBC w/Diff Recent Labs      02/07/18 0456 02/06/18 0408   WBC  5.2  7.6   RBC  3.67*  3.53*   HGB  10.8*  10.5*   HCT  33.5*  31.6*   PLT  286  256   GRANS  52  64   LYMPH  35  26   EOS  4  3      Cardiac Enzymes No results for input(s): CPK, CKND1, ROSCOE in the last 72 hours. No lab exists for component: CKRMB, TROIP   Coagulation No results for input(s): PTP, INR, APTT in the last 72 hours. No lab exists for component: INREXT    Lipid Panel No results found for: CHOL, CHOLPOCT, CHOLX, CHLST, CHOLV, 277140, HDL, LDL, LDLC, DLDLP, 171926, VLDLC, VLDL, TGLX, TRIGL, TRIGP, TGLPOCT, CHHD, CHHDX   BNP No results for input(s): BNPP in the last 72 hours.    Liver Enzymes Recent Labs      02/07/18 0456   TP  4.0*   ALB  1.7*   AP  56   SGOT  10*      Thyroid Studies Lab Results   Component Value Date/Time    TSH 1.85 09/15/2011 05:20 AM          Imaging:  CT abdomen/pelvis on 2/3/2018:  Imaging findings diagnostic of infectious or inflammatory colitis    Consults:  GI    Treatment Team: Treatment Team: Attending Provider: Cliff Arguelles MD; Consulting Provider: Tico Calhoun MD; Utilization Review: Flor Bill RN; Care Manager: Emperatriz Medina; Consulting Provider: Raji Hoffman MD; Occupational Therapy Assistant: Bubba Paredes; Physical Therapy Assistant: Malka Garg PTA    Significant Diagnostic Studies:SEE RECENT LAB RESULTS    Hospital Course:  56 y.old male with h/o diabetes type I,anxiety,psychiatric issues,was brought to ER because of diarrhea,recurrent. C-diff PCR was positive. Patient has been on vanc po and gi has been following. [place] [facility] for head trauma with traumatic brain injury. Patient's diarrhea has resolved as he continues to take po vancomycin as per GI recommenmdation. No diarrhea has been reported today by the nurse or patient. He will be discharged to SNF today. Patient's systolic blood pressure has been in upper 80's to 100,likely due to his history of brain injury on top of Parkinson's disease and psychiatric medications. He is asymptomatic. Also has h/o diabetes and requires closely monitoring of his blood glucose since he can easily become hypoglycemic after short acting insulin is given. P/E  Lungs ctab  Heart s1s2 nl  Extr:no edema. Time for discharge:40 minutes.      Cliff Arguelles MD  February 8, 2018

## 2018-02-08 NOTE — ROUTINE PROCESS
1449 Report given to [facility] to DEAN Long. Prescription in the envelop. [facility]

## 2018-02-08 NOTE — PROGRESS NOTES
Spoke with pts brother he went to [facility], he would like pt to go there. Spoke with rohan there,  They can accpt, she is aware pt has cdiff, on isol. Spoke with dr Francia De La Cruz he will let me know if pt will be dc'd.

## 2018-02-08 NOTE — ANCILLARY DISCHARGE INSTRUCTIONS
Patient and/or next of kin has been given the [facility] Important Message From Medicare About Your Rights\" letter and all questions were answered.

## 2018-02-08 NOTE — ROUTINE PROCESS
0900 Patient alert and oriented. BP low - Dr De Cherry paged and made aware. 0920 Repeat blood pressure better.

## 2018-02-14 ENCOUNTER — APPOINTMENT (OUTPATIENT)
Dept: CT IMAGING | Age: 58
DRG: 683 | End: 2018-02-14
Attending: EMERGENCY MEDICINE
Payer: MEDICARE

## 2018-02-14 ENCOUNTER — HOSPITAL ENCOUNTER (INPATIENT)
Age: 58
LOS: 3 days | Discharge: NURSING, REHAB OR LONG-TERM CARE | DRG: 683 | End: 2018-02-17
Attending: EMERGENCY MEDICINE | Admitting: HOSPITALIST
Payer: MEDICARE

## 2018-02-14 ENCOUNTER — APPOINTMENT (OUTPATIENT)
Dept: GENERAL RADIOLOGY | Age: 58
DRG: 683 | End: 2018-02-14
Attending: EMERGENCY MEDICINE
Payer: MEDICARE

## 2018-02-14 DIAGNOSIS — E87.0 HYPERNATREMIA: Primary | ICD-10-CM

## 2018-02-14 DIAGNOSIS — N17.9 AKI (ACUTE KIDNEY INJURY) (HCC): ICD-10-CM

## 2018-02-14 DIAGNOSIS — E86.0 DEHYDRATION: ICD-10-CM

## 2018-02-14 LAB
ALBUMIN SERPL-MCNC: 2.8 G/DL (ref 3.4–5)
ALBUMIN/GLOB SERPL: 0.7 {RATIO} (ref 0.8–1.7)
ALP SERPL-CCNC: 95 U/L (ref 45–117)
ALT SERPL-CCNC: 17 U/L (ref 16–61)
ANION GAP SERPL CALC-SCNC: 13 MMOL/L (ref 3–18)
APPEARANCE UR: CLEAR
ARTERIAL PATENCY WRIST A: ABNORMAL
AST SERPL-CCNC: 7 U/L (ref 15–37)
BACTERIA URNS QL MICRO: ABNORMAL /HPF
BASE DEFICIT BLDV-SCNC: 6 MMOL/L
BASOPHILS # BLD: 0 K/UL (ref 0–0.06)
BASOPHILS NFR BLD: 0 % (ref 0–2)
BDY SITE: ABNORMAL
BILIRUB SERPL-MCNC: 0.6 MG/DL (ref 0.2–1)
BILIRUB UR QL: ABNORMAL
BODY TEMPERATURE: 98.3
BUN SERPL-MCNC: 29 MG/DL (ref 7–18)
BUN/CREAT SERPL: 19 (ref 12–20)
CALCIUM SERPL-MCNC: 9.8 MG/DL (ref 8.5–10.1)
CHLORIDE SERPL-SCNC: 122 MMOL/L (ref 100–108)
CK MB CFR SERPL CALC: NORMAL % (ref 0–4)
CK MB SERPL-MCNC: <1 NG/ML (ref 5–25)
CK SERPL-CCNC: 75 U/L (ref 39–308)
CO2 SERPL-SCNC: 25 MMOL/L (ref 21–32)
COLOR UR: YELLOW
CREAT SERPL-MCNC: 1.49 MG/DL (ref 0.6–1.3)
DIFFERENTIAL METHOD BLD: ABNORMAL
EOSINOPHIL # BLD: 0 K/UL (ref 0–0.4)
EOSINOPHIL NFR BLD: 0 % (ref 0–5)
EPITH CASTS URNS QL MICRO: ABNORMAL /LPF (ref 0–5)
ERYTHROCYTE [DISTWIDTH] IN BLOOD BY AUTOMATED COUNT: 17.3 % (ref 11.6–14.5)
EST. AVERAGE GLUCOSE BLD GHB EST-MCNC: 169 MG/DL
GAS FLOW.O2 O2 DELIVERY SYS: ABNORMAL L/MIN
GAS FLOW.O2 SETTING OXYMISER: 2 L/M
GLOBULIN SER CALC-MCNC: 3.8 G/DL (ref 2–4)
GLUCOSE BLD STRIP.AUTO-MCNC: 400 MG/DL (ref 70–110)
GLUCOSE SERPL-MCNC: 376 MG/DL (ref 74–99)
GLUCOSE UR STRIP.AUTO-MCNC: 500 MG/DL
HBA1C MFR BLD: 7.5 % (ref 4.2–5.6)
HCO3 BLDV-SCNC: 20.2 MMOL/L (ref 23–28)
HCT VFR BLD AUTO: 38.8 % (ref 36–48)
HGB BLD-MCNC: 13.3 G/DL (ref 13–16)
HGB UR QL STRIP: ABNORMAL
KETONES UR QL STRIP.AUTO: >80 MG/DL
LACTATE BLD-SCNC: 1 MMOL/L (ref 0.4–2)
LEUKOCYTE ESTERASE UR QL STRIP.AUTO: NEGATIVE
LYMPHOCYTES # BLD: 1 K/UL (ref 0.9–3.6)
LYMPHOCYTES NFR BLD: 12 % (ref 21–52)
MCH RBC QN AUTO: 30 PG (ref 24–34)
MCHC RBC AUTO-ENTMCNC: 34.3 G/DL (ref 31–37)
MCV RBC AUTO: 87.6 FL (ref 74–97)
MONOCYTES # BLD: 0.6 K/UL (ref 0.05–1.2)
MONOCYTES NFR BLD: 7 % (ref 3–10)
NEUTS SEG # BLD: 6.8 K/UL (ref 1.8–8)
NEUTS SEG NFR BLD: 81 % (ref 40–73)
NITRITE UR QL STRIP.AUTO: NEGATIVE
O2/TOTAL GAS SETTING VFR VENT: 0.28 %
PCO2 BLDV: 38.8 MMHG (ref 41–51)
PH BLDV: 7.32 [PH] (ref 7.32–7.42)
PH UR STRIP: 6.5 [PH] (ref 5–8)
PLATELET # BLD AUTO: 394 K/UL (ref 135–420)
PMV BLD AUTO: 9.3 FL (ref 9.2–11.8)
PO2 BLDV: 42 MMHG (ref 25–40)
POTASSIUM SERPL-SCNC: 3.8 MMOL/L (ref 3.5–5.5)
PROT SERPL-MCNC: 6.6 G/DL (ref 6.4–8.2)
PROT UR STRIP-MCNC: 100 MG/DL
RBC # BLD AUTO: 4.43 M/UL (ref 4.7–5.5)
RBC #/AREA URNS HPF: ABNORMAL /HPF (ref 0–5)
SAO2 % BLDV: 75 % (ref 65–88)
SERVICE CMNT-IMP: ABNORMAL
SODIUM SERPL-SCNC: 160 MMOL/L (ref 136–145)
SP GR UR REFRACTOMETRY: >1.03 (ref 1–1.03)
SPECIMEN TYPE: ABNORMAL
TOTAL RESP. RATE, ITRR: 13
TROPONIN I SERPL-MCNC: <0.02 NG/ML (ref 0–0.04)
UROBILINOGEN UR QL STRIP.AUTO: 0.2 EU/DL (ref 0.2–1)
WBC # BLD AUTO: 8.3 K/UL (ref 4.6–13.2)
WBC URNS QL MICRO: ABNORMAL /HPF (ref 0–4)

## 2018-02-14 PROCEDURE — 84484 ASSAY OF TROPONIN QUANT: CPT

## 2018-02-14 PROCEDURE — 82962 GLUCOSE BLOOD TEST: CPT

## 2018-02-14 PROCEDURE — 81001 URINALYSIS AUTO W/SCOPE: CPT | Performed by: EMERGENCY MEDICINE

## 2018-02-14 PROCEDURE — 96361 HYDRATE IV INFUSION ADD-ON: CPT

## 2018-02-14 PROCEDURE — 77030010545

## 2018-02-14 PROCEDURE — 77030011943

## 2018-02-14 PROCEDURE — 83605 ASSAY OF LACTIC ACID: CPT

## 2018-02-14 PROCEDURE — 51701 INSERT BLADDER CATHETER: CPT

## 2018-02-14 PROCEDURE — 87040 BLOOD CULTURE FOR BACTERIA: CPT | Performed by: EMERGENCY MEDICINE

## 2018-02-14 PROCEDURE — 85025 COMPLETE CBC W/AUTO DIFF WBC: CPT

## 2018-02-14 PROCEDURE — 99285 EMERGENCY DEPT VISIT HI MDM: CPT

## 2018-02-14 PROCEDURE — 74011636637 HC RX REV CODE- 636/637: Performed by: HOSPITALIST

## 2018-02-14 PROCEDURE — 93005 ELECTROCARDIOGRAM TRACING: CPT

## 2018-02-14 PROCEDURE — 65270000029 HC RM PRIVATE

## 2018-02-14 PROCEDURE — 96360 HYDRATION IV INFUSION INIT: CPT

## 2018-02-14 PROCEDURE — 74011250636 HC RX REV CODE- 250/636: Performed by: EMERGENCY MEDICINE

## 2018-02-14 PROCEDURE — 71250 CT THORAX DX C-: CPT

## 2018-02-14 PROCEDURE — 80053 COMPREHEN METABOLIC PANEL: CPT

## 2018-02-14 PROCEDURE — 71045 X-RAY EXAM CHEST 1 VIEW: CPT

## 2018-02-14 PROCEDURE — 83036 HEMOGLOBIN GLYCOSYLATED A1C: CPT | Performed by: HOSPITALIST

## 2018-02-14 PROCEDURE — 74011250636 HC RX REV CODE- 250/636: Performed by: HOSPITALIST

## 2018-02-14 PROCEDURE — 82803 BLOOD GASES ANY COMBINATION: CPT

## 2018-02-14 RX ORDER — ENOXAPARIN SODIUM 100 MG/ML
40 INJECTION SUBCUTANEOUS EVERY 24 HOURS
Status: DISCONTINUED | OUTPATIENT
Start: 2018-02-14 | End: 2018-02-17 | Stop reason: HOSPADM

## 2018-02-14 RX ORDER — ALPRAZOLAM 0.5 MG/1
0.25 TABLET ORAL
Status: DISCONTINUED | OUTPATIENT
Start: 2018-02-14 | End: 2018-02-17 | Stop reason: HOSPADM

## 2018-02-14 RX ORDER — DEXTROSE 50 % IN WATER (D50W) INTRAVENOUS SYRINGE
25-50 AS NEEDED
Status: DISCONTINUED | OUTPATIENT
Start: 2018-02-14 | End: 2018-02-17 | Stop reason: HOSPADM

## 2018-02-14 RX ORDER — INSULIN GLARGINE 100 [IU]/ML
14 INJECTION, SOLUTION SUBCUTANEOUS
Status: DISCONTINUED | OUTPATIENT
Start: 2018-02-14 | End: 2018-02-15

## 2018-02-14 RX ORDER — CITALOPRAM 20 MG/1
40 TABLET, FILM COATED ORAL DAILY
Status: DISCONTINUED | OUTPATIENT
Start: 2018-02-15 | End: 2018-02-17 | Stop reason: HOSPADM

## 2018-02-14 RX ORDER — MAGNESIUM SULFATE 100 %
4 CRYSTALS MISCELLANEOUS AS NEEDED
Status: DISCONTINUED | OUTPATIENT
Start: 2018-02-14 | End: 2018-02-17 | Stop reason: HOSPADM

## 2018-02-14 RX ORDER — MODAFINIL 100 MG/1
200 TABLET ORAL DAILY
Status: DISCONTINUED | OUTPATIENT
Start: 2018-02-15 | End: 2018-02-17 | Stop reason: HOSPADM

## 2018-02-14 RX ORDER — INSULIN LISPRO 100 [IU]/ML
INJECTION, SOLUTION INTRAVENOUS; SUBCUTANEOUS
Status: DISCONTINUED | OUTPATIENT
Start: 2018-02-14 | End: 2018-02-15

## 2018-02-14 RX ORDER — DEXTROSE, SODIUM CHLORIDE, AND POTASSIUM CHLORIDE 5; .45; .15 G/100ML; G/100ML; G/100ML
150 INJECTION INTRAVENOUS CONTINUOUS
Status: DISCONTINUED | OUTPATIENT
Start: 2018-02-14 | End: 2018-02-15

## 2018-02-14 RX ORDER — PRAMIPEXOLE DIHYDROCHLORIDE 0.25 MG/1
0.12 TABLET ORAL 3 TIMES DAILY
Status: DISCONTINUED | OUTPATIENT
Start: 2018-02-14 | End: 2018-02-17 | Stop reason: HOSPADM

## 2018-02-14 RX ADMIN — SODIUM CHLORIDE 1000 ML: 900 INJECTION, SOLUTION INTRAVENOUS at 15:03

## 2018-02-14 RX ADMIN — POTASSIUM CHLORIDE, DEXTROSE MONOHYDRATE AND SODIUM CHLORIDE 150 ML/HR: 150; 5; 450 INJECTION, SOLUTION INTRAVENOUS at 20:28

## 2018-02-14 RX ADMIN — INSULIN GLARGINE 14 UNITS: 100 INJECTION, SOLUTION SUBCUTANEOUS at 23:21

## 2018-02-14 RX ADMIN — ENOXAPARIN SODIUM 40 MG: 40 INJECTION SUBCUTANEOUS at 20:28

## 2018-02-14 RX ADMIN — INSULIN LISPRO 15 UNITS: 100 INJECTION, SOLUTION INTRAVENOUS; SUBCUTANEOUS at 23:21

## 2018-02-14 NOTE — ED PROVIDER NOTES
EMERGENCY DEPARTMENT HISTORY AND PHYSICAL EXAM    2:47 PM      Date: 2/14/2018  Patient Name: Kristin Ma    History of Presenting Illness     Chief Complaint   Patient presents with    Fatigue         History Provided By: Patient     Chief Complaint: Abnormal labs  Duration: n/a  Timing:  n/a  Location: n/a  Quality: Na 160, WBC 15  Severity: n/a  Modifying Factors: n/a  Associated Symptoms: n/a      Additional History (Context): Kristin Ma is a 56 y.o. male presents with a h/o Parkinson's Disease, intracranial injury, and Mental disorder who present to the ED via EMS from  [facility] [address] for evaluation of abnormal labs, Na of 160 and WBC of 15, obtained yesterday in outpatient. The patient was also sent to the ED for evaluation of poor intake. Upon arrival to the ED the patient is sleepy. He is easily arousable to voice. Additional history is unobtainable due to patient's mental status. PCP: Shailesh Ferrara MD    Current Outpatient Prescriptions   Medication Sig Dispense Refill    vancomycin 50 mg/mL oral solution (compounded) Take 2.5 mL by mouth every six (6) hours. 125 mg po qid x 12 days,then 125 mg po bid x 7 days;then 125 mg po daily x 7 days;then 125 mg po every other day x 7 days;then 125 mg po q 3 days x 14 days 1 Bottle 0    ALPRAZolam (XANAX) 0.25 mg tablet Take 1 Tab by mouth every eight (8) hours as needed for Anxiety. Max Daily Amount: 0.75 mg. 10 Tab 0    citalopram (CELEXA) 40 mg tablet Take 1 Tab by mouth daily. Indications: ANXIETY WITH DEPRESSION 10 Tab 0    modafinil (PROVIGIL) 200 mg tablet Take 200 mg by mouth daily.  nortriptyline (PAMELOR) 75 mg capsule Take 150 mg by mouth nightly. Indications: 150mg at dinner and 75mg at hs      pramipexole (MIRAPEX) 0.125 mg tablet Take 0.125 mg by mouth three (3) times daily.  Indications: IDIOPATHIC PARKINSONISM      insulin glargine (LANTUS) 100 unit/mL injection 14 Units by SubCUTAneous route nightly.  insulin aspart (NOVOLOG) 100 unit/mL injection by SubCUTAneous route Before breakfast, lunch, dinner and at bedtime. Indications: type 1 diabetes mellitus         Past History     Past Medical History:  Past Medical History:   Diagnosis Date    Anxiety     Diabetes ([address]     Ill-defined condition     hypotension    Psychiatric disorder     depression       Past Surgical History:  History reviewed. No pertinent surgical history. Family History:  History reviewed. No pertinent family history. Social History:  Social History   Substance Use Topics    Smoking status: Former Smoker    Smokeless tobacco: Never Used    Alcohol use No       Allergies:  No Known Allergies      Review of Systems     Review of Systems   Unable to perform ROS: Other (Mental Disorder)         Physical Exam     Visit Vitals    /69    Pulse 93    Temp 99.1 °F (37.3 °C)    Resp 24    SpO2 100%  Comment: not a good pleth on the monitor       Physical Exam   Constitutional: He appears well-developed and well-nourished. Cachectic appearing    HENT:   Head: Normocephalic and atraumatic. Eyes: Conjunctivae are normal. No scleral icterus. Neck: Normal range of motion. Neck supple. No JVD present. Cardiovascular: Normal rate, regular rhythm and normal heart sounds. 4 intact extremity pulses   Pulmonary/Chest: Effort normal and breath sounds normal.   Abdominal: Soft. He exhibits no mass. There is no tenderness. Musculoskeletal: Normal range of motion. Lymphadenopathy:     He has no cervical adenopathy. Neurological: He is alert. The patient answers yes or no to questions  Does not follow commands   Responsive to verbal stimuli   Skin: Skin is warm and dry. Nursing note and vitals reviewed.         Diagnostic Study Results     Labs -  Recent Results (from the past 12 hour(s))   EKG, 12 LEAD, INITIAL    Collection Time: 02/14/18 11:17 AM   Result Value Ref Range    Ventricular Rate 118 BPM Atrial Rate 118 BPM    P-R Interval 104 ms    QRS Duration 82 ms    Q-T Interval 442 ms    QTC Calculation (Bezet) 619 ms    Calculated R Axis 76 degrees    Calculated T Axis 62 degrees    Diagnosis       Sinus tachycardia with short LA  Nonspecific T wave abnormality  Abnormal ECG  When compared with ECG of 03-FEB-2018 10:22,  LA interval has decreased  Nonspecific T wave abnormality no longer evident in Inferior leads     URINALYSIS W/ RFLX MICROSCOPIC    Collection Time: 02/14/18 11:46 AM   Result Value Ref Range    Color YELLOW      Appearance CLEAR      Specific gravity >1.030 (H) 1.003 - 1.030    pH (UA) 6.5 5.0 - 8.0      Protein 100 (A) NEG mg/dL    Glucose 500 (A) NEG mg/dL    Ketone >80 (A) NEG mg/dL    Bilirubin LARGE (A) NEG      Blood TRACE (A) NEG      Urobilinogen 0.2 0.2 - 1.0 EU/dL    Nitrites NEGATIVE  NEG      Leukocyte Esterase NEGATIVE  NEG     URINE MICROSCOPIC ONLY    Collection Time: 02/14/18 11:46 AM   Result Value Ref Range    WBC 0 to 3 0 - 4 /hpf    RBC 0 to 3 0 - 5 /hpf    Epithelial cells FEW 0 - 5 /lpf    Bacteria FEW (A) NEG /hpf   CBC WITH AUTOMATED DIFF    Collection Time: 02/14/18  2:40 PM   Result Value Ref Range    WBC 8.3 4.6 - 13.2 K/uL    RBC 4.43 (L) 4.70 - 5.50 M/uL    HGB 13.3 13.0 - 16.0 g/dL    HCT 38.8 36.0 - 48.0 %    MCV 87.6 74.0 - 97.0 FL    MCH 30.0 24.0 - 34.0 PG    MCHC 34.3 31.0 - 37.0 g/dL    RDW 17.3 (H) 11.6 - 14.5 %    PLATELET 437 985 - 146 K/uL    MPV 9.3 9.2 - 11.8 FL    NEUTROPHILS 81 (H) 40 - 73 %    LYMPHOCYTES 12 (L) 21 - 52 %    MONOCYTES 7 3 - 10 %    EOSINOPHILS 0 0 - 5 %    BASOPHILS 0 0 - 2 %    ABS. NEUTROPHILS 6.8 1.8 - 8.0 K/UL    ABS. LYMPHOCYTES 1.0 0.9 - 3.6 K/UL    ABS. MONOCYTES 0.6 0.05 - 1.2 K/UL    ABS. EOSINOPHILS 0.0 0.0 - 0.4 K/UL    ABS.  BASOPHILS 0.0 0.0 - 0.06 K/UL    DF AUTOMATED     METABOLIC PANEL, COMPREHENSIVE    Collection Time: 02/14/18  2:40 PM   Result Value Ref Range    Sodium 160 (H) 136 - 145 mmol/L    Potassium 3.8 3.5 - 5.5 mmol/L    Chloride 122 (H) 100 - 108 mmol/L    CO2 25 21 - 32 mmol/L    Anion gap 13 3.0 - 18 mmol/L    Glucose 376 (H) 74 - 99 mg/dL    BUN 29 (H) 7.0 - 18 MG/DL    Creatinine 1.49 (H) 0.6 - 1.3 MG/DL    BUN/Creatinine ratio 19 12 - 20      GFR est AA 59 (L) >60 ml/min/1.73m2    GFR est non-AA 49 (L) >60 ml/min/1.73m2    Calcium 9.8 8.5 - 10.1 MG/DL    Bilirubin, total 0.6 0.2 - 1.0 MG/DL    ALT (SGPT) 17 16 - 61 U/L    AST (SGOT) 7 (L) 15 - 37 U/L    Alk. phosphatase 95 45 - 117 U/L    Protein, total 6.6 6.4 - 8.2 g/dL    Albumin 2.8 (L) 3.4 - 5.0 g/dL    Globulin 3.8 2.0 - 4.0 g/dL    A-G Ratio 0.7 (L) 0.8 - 1.7     CARDIAC PANEL,(CK, CKMB & TROPONIN)    Collection Time: 02/14/18  2:40 PM   Result Value Ref Range    CK 75 39 - 308 U/L    CK - MB <1.0 <3.6 ng/ml    CK-MB Index  0.0 - 4.0 %     CALCULATION NOT PERFORMED WHEN RESULT IS BELOW LINEAR LIMIT    Troponin-I, Qt. <0.02 0.0 - 0.045 NG/ML   POC LACTIC ACID    Collection Time: 02/14/18  3:00 PM   Result Value Ref Range    Lactic Acid (POC) 1.0 0.4 - 2.0 mmol/L   POC VENOUS BLOOD GAS    Collection Time: 02/14/18  5:15 PM   Result Value Ref Range    Device: NASAL CANNULA      Flow rate (POC) 2 L/M    FIO2 (POC) 0.28 %    pH, venous (POC) 7.324 7.32 - 7.42      pCO2, venous (POC) 38.8 (L) 41 - 51 MMHG    pO2, venous (POC) 42 (H) 25 - 40 mmHg    HCO3, venous (POC) 20.2 (L) 23.0 - 28.0 MMOL/L    sO2, venous (POC) 75 65 - 88 %    Base deficit, venous (POC) 6 mmol/L    Allens test (POC) N/A      Total resp. rate 13      Site OTHER      Patient temp. 98.3      Specimen type (POC) VENOUS BLOOD      Performed by Magdalena Araujo        Radiologic Studies -   CT CHEST WO CONT   Final Result      XR CHEST PORT   Final Result        Ct Chest Wo Cont    Result Date: 2/14/2018  EXAM: CT Chest INDICATION: Acute asthma exacerbation. Chest pain, shortness of breath. COMPARISON: None.  TECHNIQUE: Axial CT imaging from the thoracic inlet through the diaphragm without intravenous contrast. Multiplanar reformats were generated. One or more dose reduction techniques were used on this CT: automated exposure control, adjustment of the mAs and/or kVp according to patient's size, and iterative reconstruction techniques. The specific techniques utilized on this CT exam have been documented in the patient's electronic medical record. _______________ FINDINGS: LUNGS: Hazy/groundglass opacities are noted in the right lower lobe dependently. A few scattered emphysematous blebs are seen bilaterally. Lungs otherwise clear. Sara Mustard PLEURA: Normal. AIRWAY: Normal. MEDIASTINUM: Normal heart size. No pericardial effusion. Great vessels unremarkable. LYMPH NODES: No enlarged lymph nodes. UPPER ABDOMEN: Unremarkable. OTHER: No acute or aggressive osseous abnormalities identified. _______________     IMPRESSION: Hazy/groundglass opacities in right lower lobe could findings nonspecific, but very early infectious/inflammatory process not excluded. Scattered emphysematous changes. Xr Chest Port    Result Date: 2/14/2018  CHEST AP PORTABLE Indication: Chest pain and shortness of breath. Comparison: 02/04/2018. Findings: The lungs appear clear. There is linear opacity in the paramediastinal left upper lobe and peripheral left lung base, not typical presentation of pneumothorax. No evidence for right pneumothorax or pleural effusion. Cardiac silhouette and pulmonary vascularity appear within normal limits. Healed fracture deformity of the right clavicle and left inferior ribs noted. IMPRESSION: No acute cardiopulmonary disease. Linear opacity in the paramediastinal left upper chest and peripheral left lung base, not typical presentation for pneumothorax. Recommend follow-up inspiratory and expiratory views to exclude pneumothorax.       Medical Decision Making   Initial Medical Decision Making and DDx:  Hypernatremia, dehydration, progression of dementia, other acute metabolic encephalopathy, infection ED Course: Progress Notes, Reevaluation, and Consults:  5:03 PM Patient's CT show no pneumothorax, paging hospitalist now. I am the first provider for this patient. I reviewed the vital signs, available nursing notes, past medical history, past surgical history, family history and social history. Vital Signs-Reviewed the patient's vital signs. EKG: Interpreted by the EP. Time Interpreted: 4411   Rate: sinus tachycardia   Rhythm: nonspecific ST and T changes    Interpretation: no change from prior    Comparison:       Procedures:   Procedures    2:50 PM External jugular attempted  Site left external jugular. Surveyed with ultrasound, found superficial cara over [facility],    Cleaned site with alcohol,   Used 20 ga angiocath and diffusix to attempt canulation. Tried 4 times. No serious complications  Unsuccessful. Pt is uncooperative    2:50 PM  Left internal jugular peripheral catheterization  Consent attempted to contact wife and brother listed on face sheet, no answer from brother  Wife's number call not going through. The patient requires labs urgently to evaluate for electrolyte abnormlaity, kidney failure, need for admission. Surveyed with ultrasounds and conducted with real time guideance  Cleaned with alcohol, 18 ga diffusix used, pt uncooperative  Flashback obtained, advanced cath easily  Labs drawn  Flushed with saline  Secured with tegaderm  After procedures use US to visualize cath in vein  2 attempts  No serious complications. For Hospitalized Patients:  5:22 PM Consult: I discussed care with Dr. Duke Wesley, hospitalist. It was a standard discussion, including history of patients chief complaint, available diagnostic results, and treatment course. He agrees to see and admit the patient. Diagnosis     Clinical Impression:   1. Hypernatremia    2.  SILVERIO (acute kidney injury) ([address]    3. Dehydration        Disposition: Admit    Follow-up Information     None Patient's Medications   Start Taking    No medications on file   Continue Taking    ALPRAZOLAM (XANAX) 0.25 MG TABLET    Take 1 Tab by mouth every eight (8) hours as needed for Anxiety. Max Daily Amount: 0.75 mg. CITALOPRAM (CELEXA) 40 MG TABLET    Take 1 Tab by mouth daily. Indications: ANXIETY WITH DEPRESSION    INSULIN ASPART (NOVOLOG) 100 UNIT/ML INJECTION    by SubCUTAneous route Before breakfast, lunch, dinner and at bedtime. Indications: type 1 diabetes mellitus    INSULIN GLARGINE (LANTUS) 100 UNIT/ML INJECTION    14 Units by SubCUTAneous route nightly. MODAFINIL (PROVIGIL) 200 MG TABLET    Take 200 mg by mouth daily. NORTRIPTYLINE (PAMELOR) 75 MG CAPSULE    Take 150 mg by mouth nightly. Indications: 150mg at dinner and 75mg at hs    PRAMIPEXOLE (MIRAPEX) 0.125 MG TABLET    Take 0.125 mg by mouth three (3) times daily. Indications: IDIOPATHIC PARKINSONISM    VANCOMYCIN 50 MG/ML ORAL SOLUTION (COMPOUNDED)    Take 2.5 mL by mouth every six (6) hours. 125 mg po qid x 12 days,then 125 mg po bid x 7 days;then 125 mg po daily x 7 days;then 125 mg po every other day x 7 days;then 125 mg po q 3 days x 14 days   These Medications have changed    No medications on file   Stop Taking    No medications on file     _______________________________    Attestations:  Low Barrientos acting as a scribe for and in the presence of Joanie Mccarthy MD      February 14, 2018 at 2:47 PM       Provider Attestation:      I personally performed the services described in the documentation, reviewed the documentation, as recorded by the scribe in my presence, and it accurately and completely records my words and actions.  February 14, 2018 at 2:47 PM - Joanie Mccarthy MD    _______________________________

## 2018-02-14 NOTE — H&P
History and Physical    Patient: Eve Bragg               Sex: male          DOA: 2/14/2018       YOB: 1960      Age:  56 y.o.        LOS:  LOS: 0 days        HPI:     Eve Bragg is a 56 y.o. male who was [facility] the ER from [facility]. He had decrease in his mental status and decrease in his oral intake. In the ER he was found to have acute renal failure. He has recently had C diff colitis and has been on oral Vancomycin. He will be admitted for ongoing management. Past Medical History:   Diagnosis Date    Anxiety     Diabetes ([address]     Ill-defined condition     hypotension    Psychiatric disorder     depression       Social History:   Tobacco use:  Patient does not use tobacco   Alcohol use:  Patient does not use alcohol   Patient is from [facility]    Family History:   Multiple family members with HTN    Review of Systems    Constitutional:  No fever or weight loss  HEENT:  No headache or visual changes  Cardiovascular:  No chest pain or diaphoresis  Respiratory:  No coughing, wheezing, or shortness of breath. GI:  No nausea or vomitting. No diarrhea  :  No hematuria or dysuria  Skin:  No rashes or moles  Neuro:  No seizures or syncope  Hematological:  No bruising or bleeding  Endocrine:  Patient has known diabetes, no thyroid disease    Physical Exam:      Visit Vitals    /75    Pulse 95    Temp 99.1 °F (37.3 °C)    Resp 17    SpO2 100%       Physical Exam:    Gen:  Agitated, Upset, Yells out \"No\" quite commonly, emaciated  HEENT:  Normal cephalic atraumatic, extra-occular movements are intact. Neck:  Supple, No JVD  Lungs:  Clear bilaterally, no wheeze, no rales, normal effort  Heart:  Regular Rate and Rhythm, normal S1 and S2, no edema  Abdomen:  Soft, non tender, normal bowel sounds, no guarding. Extremities:  Well perfused, no cyanosis or edema  Neurological:  Agitated with normal tone.   No seizure  Skin:  Scattered ecchymoses on knees and on extremities, no rashes or moles      Laboratory Studies:    BMP:   Lab Results   Component Value Date/Time     (H) 02/14/2018 02:40 PM    K 3.8 02/14/2018 02:40 PM     (H) 02/14/2018 02:40 PM    CO2 25 02/14/2018 02:40 PM    AGAP 13 02/14/2018 02:40 PM     (H) 02/14/2018 02:40 PM    BUN 29 (H) 02/14/2018 02:40 PM    CREA 1.49 (H) 02/14/2018 02:40 PM    GFRAA 59 (L) 02/14/2018 02:40 PM    GFRNA 49 (L) 02/14/2018 02:40 PM     CBC:   Lab Results   Component Value Date/Time    WBC 8.3 02/14/2018 02:40 PM    HGB 13.3 02/14/2018 02:40 PM    HCT 38.8 02/14/2018 02:40 PM     02/14/2018 02:40 PM       Assessment/Plan     Principal Problem:    Acute renal failure (ARF) ([facility] [address] (2/14/2018)    Active Problems:    IDDM (insulin dependent diabetes mellitus) ([facility] [address] (2/3/2018)      Traumatic brain injury ([facility] [address] (2/3/2018)      Psychiatric disorder (2/3/2018)      Parkinson's disease ([facility] [address] (2/3/2018)        PLAN:    Will treat with IVF and will follow renal function  BP control  Follow mental status  Will follow diarrhea, if present and use enteric precautions if needed. DVT prophylaxis.

## 2018-02-14 NOTE — PROGRESS NOTES
Received pt from ED. Pt agitated/ RICKI pt's orientation as pt only screams \"No\"/No command following. Pt provided clean gown, 4 eye skin assessment completed with Sushma Lopez RN. Mepilex placed on sacral area.

## 2018-02-14 NOTE — PROGRESS NOTES
Noted Al Pulse ruano called by Sang Womack RN in attempt to obtain medication list, and verify if Flu vaccine has been given this year. Unable to reach anyone as phone continued to ring.

## 2018-02-15 LAB
ANION GAP SERPL CALC-SCNC: 4 MMOL/L (ref 3–18)
ATRIAL RATE: 118 BPM
BASOPHILS # BLD: 0 K/UL (ref 0–0.06)
BASOPHILS NFR BLD: 0 % (ref 0–2)
BUN SERPL-MCNC: 26 MG/DL (ref 7–18)
BUN/CREAT SERPL: 22 (ref 12–20)
CALCIUM SERPL-MCNC: 9.7 MG/DL (ref 8.5–10.1)
CALCULATED R AXIS, ECG10: 76 DEGREES
CALCULATED T AXIS, ECG11: 62 DEGREES
CHLORIDE SERPL-SCNC: 131 MMOL/L (ref 100–108)
CO2 SERPL-SCNC: 31 MMOL/L (ref 21–32)
CREAT SERPL-MCNC: 1.19 MG/DL (ref 0.6–1.3)
DIAGNOSIS, 93000: NORMAL
DIFFERENTIAL METHOD BLD: ABNORMAL
EOSINOPHIL # BLD: 0 K/UL (ref 0–0.4)
EOSINOPHIL NFR BLD: 0 % (ref 0–5)
ERYTHROCYTE [DISTWIDTH] IN BLOOD BY AUTOMATED COUNT: 17.5 % (ref 11.6–14.5)
GLUCOSE BLD STRIP.AUTO-MCNC: 111 MG/DL (ref 70–110)
GLUCOSE BLD STRIP.AUTO-MCNC: 254 MG/DL (ref 70–110)
GLUCOSE BLD STRIP.AUTO-MCNC: 350 MG/DL (ref 70–110)
GLUCOSE SERPL-MCNC: 89 MG/DL (ref 74–99)
HCT VFR BLD AUTO: 36.6 % (ref 36–48)
HGB BLD-MCNC: 12.4 G/DL (ref 13–16)
LYMPHOCYTES # BLD: 1.6 K/UL (ref 0.9–3.6)
LYMPHOCYTES NFR BLD: 23 % (ref 21–52)
MCH RBC QN AUTO: 29.9 PG (ref 24–34)
MCHC RBC AUTO-ENTMCNC: 33.9 G/DL (ref 31–37)
MCV RBC AUTO: 88.2 FL (ref 74–97)
MONOCYTES # BLD: 0.6 K/UL (ref 0.05–1.2)
MONOCYTES NFR BLD: 8 % (ref 3–10)
NEUTS SEG # BLD: 4.6 K/UL (ref 1.8–8)
NEUTS SEG NFR BLD: 69 % (ref 40–73)
P-R INTERVAL, ECG05: 104 MS
PLATELET # BLD AUTO: 327 K/UL (ref 135–420)
PMV BLD AUTO: 9.3 FL (ref 9.2–11.8)
POTASSIUM SERPL-SCNC: 3.4 MMOL/L (ref 3.5–5.5)
Q-T INTERVAL, ECG07: 442 MS
QRS DURATION, ECG06: 82 MS
QTC CALCULATION (BEZET), ECG08: 619 MS
RBC # BLD AUTO: 4.15 M/UL (ref 4.7–5.5)
SODIUM SERPL-SCNC: 166 MMOL/L (ref 136–145)
VENTRICULAR RATE, ECG03: 118 BPM
WBC # BLD AUTO: 6.8 K/UL (ref 4.6–13.2)

## 2018-02-15 PROCEDURE — 65270000029 HC RM PRIVATE

## 2018-02-15 PROCEDURE — 36415 COLL VENOUS BLD VENIPUNCTURE: CPT | Performed by: HOSPITALIST

## 2018-02-15 PROCEDURE — 74011250636 HC RX REV CODE- 250/636: Performed by: HOSPITALIST

## 2018-02-15 PROCEDURE — 74011636637 HC RX REV CODE- 636/637: Performed by: HOSPITALIST

## 2018-02-15 PROCEDURE — 80048 BASIC METABOLIC PNL TOTAL CA: CPT | Performed by: HOSPITALIST

## 2018-02-15 PROCEDURE — 77010033678 HC OXYGEN DAILY

## 2018-02-15 PROCEDURE — 85025 COMPLETE CBC W/AUTO DIFF WBC: CPT | Performed by: HOSPITALIST

## 2018-02-15 PROCEDURE — 82962 GLUCOSE BLOOD TEST: CPT

## 2018-02-15 RX ORDER — DEXTROSE AND POTASSIUM CHLORIDE 5; .15 G/100ML; G/100ML
SOLUTION INTRAVENOUS CONTINUOUS
Status: DISCONTINUED | OUTPATIENT
Start: 2018-02-15 | End: 2018-02-17 | Stop reason: HOSPADM

## 2018-02-15 RX ORDER — INSULIN LISPRO 100 [IU]/ML
INJECTION, SOLUTION INTRAVENOUS; SUBCUTANEOUS EVERY 6 HOURS
Status: DISCONTINUED | OUTPATIENT
Start: 2018-02-16 | End: 2018-02-16

## 2018-02-15 RX ORDER — INSULIN GLARGINE 100 [IU]/ML
13 INJECTION, SOLUTION SUBCUTANEOUS
Status: DISCONTINUED | OUTPATIENT
Start: 2018-02-15 | End: 2018-02-16

## 2018-02-15 RX ORDER — MIDODRINE HYDROCHLORIDE 10 MG/1
10 TABLET ORAL
COMMUNITY

## 2018-02-15 RX ADMIN — POTASSIUM CHLORIDE, DEXTROSE MONOHYDRATE AND SODIUM CHLORIDE 150 ML/HR: 150; 5; 450 INJECTION, SOLUTION INTRAVENOUS at 12:19

## 2018-02-15 RX ADMIN — INSULIN LISPRO 15 UNITS: 100 INJECTION, SOLUTION INTRAVENOUS; SUBCUTANEOUS at 23:30

## 2018-02-15 RX ADMIN — ENOXAPARIN SODIUM 40 MG: 40 INJECTION SUBCUTANEOUS at 18:17

## 2018-02-15 RX ADMIN — POTASSIUM CHLORIDE, DEXTROSE MONOHYDRATE AND SODIUM CHLORIDE 150 ML/HR: 150; 5; 450 INJECTION, SOLUTION INTRAVENOUS at 04:01

## 2018-02-15 RX ADMIN — DEXTROSE MONOHYDRATE AND POTASSIUM CHLORIDE: 5; .149 INJECTION, SOLUTION INTRAVENOUS at 18:24

## 2018-02-15 RX ADMIN — INSULIN GLARGINE 13 UNITS: 100 INJECTION, SOLUTION SUBCUTANEOUS at 23:30

## 2018-02-15 NOTE — PROGRESS NOTES
Problem: Diabetes Self-Management  Goal: *Disease process and treatment process  Define diabetes and identify own type of diabetes; list 3 options for treating diabetes. Outcome: Not Progressing Towards Goal  Pt alert, Non-verbal aphasic  Goal: *Incorporating nutritional management into lifestyle  Describe effect of type, amount and timing of food on blood glucose; list 3 methods for planning meals. Outcome: Not Progressing Towards Goal  Pt non-verbal aphasic      Problem: Falls - Risk of  Goal: *Absence of Falls  Document Ernie Fall Risk and appropriate interventions in the flowsheet. Outcome: Not Progressing Towards Goal  Fall Risk Interventions:       Mentation Interventions: Door open when patient unattended    Medication Interventions: Evaluate medications/consider consulting pharmacy    Elimination Interventions: Bed/chair exit alarm, Call light in reach             Comments: Pt non-verbal, non-interactive.

## 2018-02-15 NOTE — PROGRESS NOTES
Progress Note      Patient: Artie Galindo               Sex: male          DOA: 2/14/2018       YOB: 1960      Age:  56 y.o.        LOS:  LOS: 1 day             CHIEF COMPLAINT:  Hypernatremia, improved acute renal failure    Subjective:     Patient sleeping  No distress    Objective:      Visit Vitals    /70 (BP 1 Location: Right arm, BP Patient Position: At rest)    Pulse 96    Temp 97.8 °F (36.6 °C)    Resp 15    SpO2 98%       Physical Exam:  Gen:  No distress, no complaint  Lungs:  Clear bilaterally, no wheeze or rhonchi  Heart:  Regular rate and rhythm, no murmurs or gallops  Abdomen:  Soft, non-tender, normal bowel sounds        Lab/Data Reviewed:  BMP:   Lab Results   Component Value Date/Time     (HH) 02/15/2018 07:00 AM    K 3.4 (L) 02/15/2018 07:00 AM     (H) 02/15/2018 07:00 AM    CO2 31 02/15/2018 07:00 AM    AGAP 4 02/15/2018 07:00 AM    GLU 89 02/15/2018 07:00 AM    BUN 26 (H) 02/15/2018 07:00 AM    CREA 1.19 02/15/2018 07:00 AM    GFRAA >60 02/15/2018 07:00 AM    GFRNA >60 02/15/2018 07:00 AM     CBC:   Lab Results   Component Value Date/Time    WBC 6.8 02/15/2018 07:00 AM    HGB 12.4 (L) 02/15/2018 07:00 AM    HCT 36.6 02/15/2018 07:00 AM     02/15/2018 07:00 AM           Assessment/Plan     Principal Problem:    Acute renal failure (ARF) ([facility]) (2/14/2018)    Active Problems:    IDDM (insulin dependent diabetes mellitus) ([facility] [address] (2/3/2018)      Traumatic brain injury ([facility] [address] (2/3/2018)      Psychiatric disorder (2/3/2018)      Parkinson's disease ([facility] [address] (2/3/2018)      Hypernatremia    Plan:  Change to D5W with potassium  Follow sodium  May consult renal   Follow stool. DVT prophylaxis.

## 2018-02-15 NOTE — ACP (ADVANCE CARE PLANNING)
Patient has designated _______brother_________________ to participate in his/her discharge plan and to receive any needed information. Name: Lucia Martinez  Address:  Phone number: Varun Fonseca will not be at his phone until Monday, on a cruise. Contact now is [email or web address].

## 2018-02-15 NOTE — PROGRESS NOTES
Contacted [facility] re flu vaccine status. They are unable to determine date flu vaccine last eceived, listed as \"historical\" on patient's chart. Updated Primary RN, Sincere John.

## 2018-02-15 NOTE — PROGRESS NOTES
1945  report received from [place], [place]. patient is resting quietly on enteric precautions for c-diff on previous admission earlier this month. notified by CNA of a stool today, instructed to collect a sample if patient has another. patient is nonverbal, vitals stable. see flowsheets for full assessment. 2230  CNA unable to obtain specimen for POC glucose. 2300    poc glucose 400. scheduled 14 units lantus given. humalog changed to very insulin resistant scale and 15 units given. will continue to monitor    0500  phlebotomy attempted to obtain labs x 2 and was unsuccessful. will send someone else when available. 0700  AM labs drawn    Bedside and Verbal shift change report given to Kacy RN (oncoming nurse) by Charity Tian RN   (offgoing nurse). Report included the following information SBAR, Kardex, Intake/Output, MAR and Recent Results.

## 2018-02-15 NOTE — PROGRESS NOTES
Patient received in bed awake from [address]. Patient alert and not oriented, behavior indicators negative for pain and discomfort. Patient resting quietly. Frequent use items within reach. Bed locked in low position. Call bell within reach. 4793:  Received a critical lab value from [address]. Critical lab value Sodium 166.      0732:  Dr. Juana Poole paged to inform him of the above.     4653:  Repaged Dr. Jonathan Watkins:  Repaged Dr. Juana Poole

## 2018-02-15 NOTE — PROGRESS NOTES
Bedside and Verbal shift change report given to [address] [place] [address] (oncoming nurse) by [place] RN (offgoing nurse). Report included the following information SBAR, Kardex, MAR and Recent Results.

## 2018-02-15 NOTE — PROGRESS NOTES
1157: PT orders received and chart reviewed. Sleeping soundly. Does not arouse to voice or touch. Will follow up.   1328: 2nd PT attempt. Remains sleeping soundly. Does not arouse to loud knock, touch, voice, or commands to open eyes. Will follow up.      Thank you,     Marlin Lewis, PT, DPT

## 2018-02-15 NOTE — PROGRESS NOTES
[facility]/[address]   Discharge Planning/ Assessment    Reasons for Intervention: Chart reviewed and I spoke with pt. Pt with history of TBI, Parkinsons, now with Acute renal failure. He said yes he was going back to rehab [facility]). He agreed for me to speak with his brother Lucia Martinez [phone]. There was no answer at this number, states he will be unavailable till Monday. I called [facility] and they have his email [email or web address] for contact during his cruise. They state brother was notified of pt's admission. Patient with Medicare a and b, and [facility] medicaid. If pt is here till Monday, his brother will probably drive him, if not, we need to arrange transportation. His transition plan is back to [facility]. Placed in [address]     High Risk Criteria  [] Yes  [x]No   Physician Referral  [] Yes  []No        Date    Nursing Referral  [] Yes  []No        Date    Patient/Family Request  [] Yes  []No        Date       Resources:    Medicare  [x] Yes  []No   Medicaid  [x] Yes  []No   No Resources  [] Yes  []No   Private Insurance  [] Yes  []No    Name/Phone Number    Other  [] Yes  []No        (i.e. Workman's Comp)         Prior Services:    Prior Services  [x] Yes  []No   Home Health  [] Yes  []No   [address]  [] Yes  []No        Number of [place]  [] Yes  []No       Meals on Wheels  [] Yes  []No   Office on Aging  [] Yes  []No   Transportation Services  [] Yes  []No   Nursing Home  [x] Yes  []No        Nursing Home Name    [address]  [] Yes  []No        [address] Name    Other       Information Source:      Information obtained from  [x] Patient  [] Parent   [] [address]  [] Child  [] Spouse   [] Significant Other/Partner   [] Friend      [] EMS    [] Nursing Home Chart          [] Other:   Chart Review  [x] Yes  []No     Family/Support System:    Patient lives with  [] Alone    [] Spouse   [] Significant Other  [] Children  [] Caretaker   [] Parent  [] Sibling     [x] Other       Other Support System:    Is the patient responsible for care of others  [] Yes  [x]No   Information of person caring for patient on  discharge    Managers financial affairs independently  [] Yes  [x]No   If no, explain:      Status Prior to Admission:    Mental Status  [x] Awake  [] Alert  [] Oriented  [] Quiet/Calm [] Lethargic/Sedated   [] Disoriented  [] Restless/Anxious  [] Combative   Personal Care  [x] Dependent  [] [address]  [] Requires Assistance   Meal Preparation Ability  [] Independent   [] Standby Assistance   [] Minimal Assistance   [] Moderate Assistance  [] Maximum Assistance     [x] Total Assistance   Chores  [] Independent with Chores   [x] [address] Resident   [] Requires Assistance   Bowel/Bladder  [] Continent  [] Catheter  [x] Incontinent  [] Ostomy Self-Care    [] Urine Diversion Self-Care  [] Maximum Assistance     [] Total Assistance   Number of Persons needed for assistance    DME at home  [] Tamara Leal  [] Chris Leal   [] Commode    [] Bathroom/Grab Bars  [] Hospital Bed  [] Nebulizer  [] Oxygen           [] Raised Toilet Seat  [] Shower Chair  [] Side Rails for Bed   [] Tub Transfer Bench   [] Jonathan Landeros  [] Eveline Llanes, Standard      [] Other:   Vendor      Treatment Presently Receiving:    Current Treatments  [] Chemotherapy  [] Dialysis  [] Insulin  [] IVAB [x] IVF   [] O2  [] PCA   [] PT   [] RT   [] Tube Feedings   [] Wound Care     Psychosocial Evaluation:    Verbalized Knowledge of Disease Process  [] Patient  []Family   Coping with Disease Process  [] Patient  []Family   Requires Further Counseling Coping with Disease Process  [] Patient  []Family     Identified Projected Needs:    Home Health Aid  [] Yes  []No   Transportation  [x] Yes  []No   Education  [] Yes  []No        Specific Education     Financial Counseling  [] Yes  []No   Inability to Care for Self/Will Require 24 hour care [] Yes  []No   Pain Management  [] Yes  []No   Home Infusion Therapy  [] Yes  []No   Oxygen Therapy  [] Yes  []No   DME  [] Yes  []No   Long Term Care Placement  [] Yes  []No   Rehab  [] Yes  []No   Physical Therapy  [] Yes  []No   Needs Anticipated At This Time  [x] Yes  []No     Intra-Hospital Referral:    [address]  [] Yes  []No     [] Yes  []No   Patient Representative  [] Yes  []No   Staff for Teaching Needs  [] Yes  []No   Specialty Teaching Needs     Diabetic Educator  [] Yes  []No   Referral for Diabetic Educator Needed  [] Yes  []No  If Yes, place order for Nutritionist or Diabetic Consult     Tentative Discharge Plan:    Home with No Services  [] Yes  [x]No   Home with Home Health Follow-up  [] Yes  []No        If Yes, specify type    Home Care Program  [] Yes  []No        If Yes, specify type    Meals on Wheels  [] Yes  []No   Office of Aging  [] Yes  []No   NHP  [] Yes  []No   Return to the Nursing Home  [x] Yes  []No   Rehab Therapy  [] Yes  []No   Acute Rehab  [] Yes  []No   Subacute Rehab  [] Yes  []No   Private Care  [] Yes  []No   Substance Abuse Referral  [] Yes  []No   Transportation  [] Yes  []No   Chore Service  [] Yes  []No   Inpatient Hospice  [] Yes  []No   OP RT  [] Yes  [] No   OP Hemo  [] Yes  [] No   OP PT  [] Yes  []No   Support Group  [] Yes  []No   Reach to Recovery  [] Yes  []No   OP Oncology Clinic  [] Yes  []No   Clinic Appointment  [] Yes  []No   DME  [] Yes  []No   Comments    Name of D/C Planner or  Given to Patient or Family John Li   Phone Number         Epwidcgvm [phone]   Date 02/1115/18   Time    If you are discharged home, whom do you designate to participate in your discharge plan and receive any information needed?      Enter name of designee brother        Phone # of designee         Address of designee         Updated         Patient refused to designate any           individual

## 2018-02-15 NOTE — PROGRESS NOTES
OT order received and chart reviewed. 1422: attempted OT evaluation; pt very lethargic. Will not open eyes to verbal or tactile stimuli. Will follow up.     Nikkie Rodríguez MS OTR/L  Office Ext: [phone]  Pager: [phone]

## 2018-02-16 LAB
ANION GAP SERPL CALC-SCNC: 7 MMOL/L (ref 3–18)
ANION GAP SERPL CALC-SCNC: 8 MMOL/L (ref 3–18)
BASOPHILS # BLD: 0 K/UL (ref 0–0.06)
BASOPHILS NFR BLD: 1 % (ref 0–2)
BUN SERPL-MCNC: 17 MG/DL (ref 7–18)
BUN SERPL-MCNC: 18 MG/DL (ref 7–18)
BUN/CREAT SERPL: 27 (ref 12–20)
BUN/CREAT SERPL: 28 (ref 12–20)
CALCIUM SERPL-MCNC: 8.9 MG/DL (ref 8.5–10.1)
CALCIUM SERPL-MCNC: 9.3 MG/DL (ref 8.5–10.1)
CHLORIDE SERPL-SCNC: 116 MMOL/L (ref 100–108)
CHLORIDE SERPL-SCNC: 122 MMOL/L (ref 100–108)
CO2 SERPL-SCNC: 30 MMOL/L (ref 21–32)
CO2 SERPL-SCNC: 30 MMOL/L (ref 21–32)
CREAT SERPL-MCNC: 0.63 MG/DL (ref 0.6–1.3)
CREAT SERPL-MCNC: 0.65 MG/DL (ref 0.6–1.3)
DIFFERENTIAL METHOD BLD: ABNORMAL
EOSINOPHIL # BLD: 0 K/UL (ref 0–0.4)
EOSINOPHIL NFR BLD: 0 % (ref 0–5)
ERYTHROCYTE [DISTWIDTH] IN BLOOD BY AUTOMATED COUNT: 17.8 % (ref 11.6–14.5)
GLUCOSE BLD STRIP.AUTO-MCNC: 117 MG/DL (ref 70–110)
GLUCOSE BLD STRIP.AUTO-MCNC: 138 MG/DL (ref 70–110)
GLUCOSE BLD STRIP.AUTO-MCNC: 212 MG/DL (ref 70–110)
GLUCOSE BLD STRIP.AUTO-MCNC: 257 MG/DL (ref 70–110)
GLUCOSE BLD STRIP.AUTO-MCNC: 64 MG/DL (ref 70–110)
GLUCOSE SERPL-MCNC: 154 MG/DL (ref 74–99)
GLUCOSE SERPL-MCNC: 44 MG/DL (ref 74–99)
HCT VFR BLD AUTO: 34.7 % (ref 36–48)
HGB BLD-MCNC: 11.5 G/DL (ref 13–16)
LYMPHOCYTES # BLD: 2 K/UL (ref 0.9–3.6)
LYMPHOCYTES NFR BLD: 30 % (ref 21–52)
MCH RBC QN AUTO: 29.9 PG (ref 24–34)
MCHC RBC AUTO-ENTMCNC: 33.1 G/DL (ref 31–37)
MCV RBC AUTO: 90.1 FL (ref 74–97)
MONOCYTES # BLD: 0.5 K/UL (ref 0.05–1.2)
MONOCYTES NFR BLD: 7 % (ref 3–10)
NEUTS SEG # BLD: 4.2 K/UL (ref 1.8–8)
NEUTS SEG NFR BLD: 62 % (ref 40–73)
PLATELET # BLD AUTO: 233 K/UL (ref 135–420)
PMV BLD AUTO: 9.7 FL (ref 9.2–11.8)
POTASSIUM SERPL-SCNC: 3.4 MMOL/L (ref 3.5–5.5)
POTASSIUM SERPL-SCNC: 3.5 MMOL/L (ref 3.5–5.5)
RBC # BLD AUTO: 3.85 M/UL (ref 4.7–5.5)
SODIUM SERPL-SCNC: 153 MMOL/L (ref 136–145)
SODIUM SERPL-SCNC: 160 MMOL/L (ref 136–145)
WBC # BLD AUTO: 6.7 K/UL (ref 4.6–13.2)

## 2018-02-16 PROCEDURE — 74011250637 HC RX REV CODE- 250/637: Performed by: HOSPITALIST

## 2018-02-16 PROCEDURE — 74011250636 HC RX REV CODE- 250/636: Performed by: HOSPITALIST

## 2018-02-16 PROCEDURE — 80048 BASIC METABOLIC PNL TOTAL CA: CPT | Performed by: HOSPITALIST

## 2018-02-16 PROCEDURE — 97161 PT EVAL LOW COMPLEX 20 MIN: CPT

## 2018-02-16 PROCEDURE — 97166 OT EVAL MOD COMPLEX 45 MIN: CPT

## 2018-02-16 PROCEDURE — 97110 THERAPEUTIC EXERCISES: CPT

## 2018-02-16 PROCEDURE — 85025 COMPLETE CBC W/AUTO DIFF WBC: CPT | Performed by: HOSPITALIST

## 2018-02-16 PROCEDURE — 82962 GLUCOSE BLOOD TEST: CPT

## 2018-02-16 PROCEDURE — 92526 ORAL FUNCTION THERAPY: CPT

## 2018-02-16 PROCEDURE — 92610 EVALUATE SWALLOWING FUNCTION: CPT

## 2018-02-16 PROCEDURE — 74011636637 HC RX REV CODE- 636/637: Performed by: HOSPITALIST

## 2018-02-16 PROCEDURE — 65270000029 HC RM PRIVATE

## 2018-02-16 PROCEDURE — 36415 COLL VENOUS BLD VENIPUNCTURE: CPT | Performed by: HOSPITALIST

## 2018-02-16 RX ORDER — INSULIN LISPRO 100 [IU]/ML
INJECTION, SOLUTION INTRAVENOUS; SUBCUTANEOUS
Status: DISCONTINUED | OUTPATIENT
Start: 2018-02-16 | End: 2018-02-17 | Stop reason: HOSPADM

## 2018-02-16 RX ORDER — INSULIN GLARGINE 100 [IU]/ML
4 INJECTION, SOLUTION SUBCUTANEOUS EVERY 12 HOURS
Status: DISCONTINUED | OUTPATIENT
Start: 2018-02-16 | End: 2018-02-17 | Stop reason: HOSPADM

## 2018-02-16 RX ADMIN — DEXTROSE MONOHYDRATE AND POTASSIUM CHLORIDE: 5; .149 INJECTION, SOLUTION INTRAVENOUS at 17:06

## 2018-02-16 RX ADMIN — PRAMIPEXOLE DIHYDROCHLORIDE 0.12 MG: 0.25 TABLET ORAL at 08:36

## 2018-02-16 RX ADMIN — DEXTROSE MONOHYDRATE AND POTASSIUM CHLORIDE: 5; .149 INJECTION, SOLUTION INTRAVENOUS at 09:29

## 2018-02-16 RX ADMIN — DEXTROSE MONOHYDRATE AND POTASSIUM CHLORIDE: 5; .149 INJECTION, SOLUTION INTRAVENOUS at 01:59

## 2018-02-16 RX ADMIN — PRAMIPEXOLE DIHYDROCHLORIDE 0.12 MG: 0.25 TABLET ORAL at 17:07

## 2018-02-16 RX ADMIN — INSULIN GLARGINE 4 UNITS: 100 INJECTION, SOLUTION SUBCUTANEOUS at 23:25

## 2018-02-16 RX ADMIN — ENOXAPARIN SODIUM 40 MG: 40 INJECTION SUBCUTANEOUS at 18:06

## 2018-02-16 RX ADMIN — PRAMIPEXOLE DIHYDROCHLORIDE 0.12 MG: 0.25 TABLET ORAL at 23:25

## 2018-02-16 RX ADMIN — CITALOPRAM HYDROBROMIDE 40 MG: 20 TABLET ORAL at 08:36

## 2018-02-16 RX ADMIN — GLUCAGON HYDROCHLORIDE 1 MG: KIT at 07:08

## 2018-02-16 RX ADMIN — INSULIN LISPRO 6 UNITS: 100 INJECTION, SOLUTION INTRAVENOUS; SUBCUTANEOUS at 13:49

## 2018-02-16 RX ADMIN — MODAFINIL 200 MG: 100 TABLET ORAL at 08:36

## 2018-02-16 RX ADMIN — INSULIN LISPRO 9 UNITS: 100 INJECTION, SOLUTION INTRAVENOUS; SUBCUTANEOUS at 23:26

## 2018-02-16 NOTE — PROGRESS NOTES
Assume care of patient lying in bed with eyes closed. Alert to self, approached and patient makes grunting gestures to answer questions. Clearly states \"No\" for direction or questions. Bed locked in lowest position. Unable to use call light for assistance and needs. Monitoring for needs and checking for assistance. Contact Isolation for +C-diff. 0710 Patient glucose 64 and NPO with Glucagon 1 mg administered IM to left deltoid. Will communicate to oncoming shift to recheck glucose. 0730 Bedside and Verbal shift change report given to VALERIA [facility] (oncoming nurse) by Angie Abreu RN (offgoing nurse). Report given with SBAR, Kardex, Intake/Output, MAR and Recent Results.

## 2018-02-16 NOTE — INTERDISCIPLINARY ROUNDS
IDR Summary      Patient: Hussein Oro MRN: [id number]    Age: 56 y.o.  : 1960     Admit Diagnosis: Acute renal failure (ARF) (HCC)        DIET status: Clears- poor PO intake     Lines/Tubes:   IV: YES   Needed: YES  Andujar: NO  Needed:NO  Central Line: NO Needed: NO      VTE Prophylaxis: Chemical    Mobility needs: Yes     PT ordered:  YES PT eval on chart: YES    OT ordered:  YES OT eval on chart: YES      ST ordered:  YES ST eval on chart:  YES     Disposition/Care Management:  Discharge plan: SNF      Home Health ordered? NO     Recommended DME from PT/OT:      DME ordered? NO     SNF- has patient been matched? YES    Accepting bed? YES   Does patient require insurance auth?   NO        Barriers to discharge:   Financial concerns:No   PCP: Nighat Iniguez MD    : NO   Interventions:       LOS: 2 days     Expected days until discharge: tomorrow  Care Management  To set patient up on will call to return to [facility]           Signed:     VIVIANE Murdock-BC  [address]  Hospitalist Division  Pager:  [phone]  Office:  [phone]

## 2018-02-16 NOTE — ANCILLARY DISCHARGE INSTRUCTIONS
Patient and/or next of kin has been given the [facility] Important Message From Medicare About Your Rights\" letter and all questions were answered.

## 2018-02-16 NOTE — PROGRESS NOTES
1930: Assumed patient care. Received report from Kacy, [place] (offgoing nurse). Report included SBAR, Kardex, and MAR. Patient resting in bed, in no signs of pain or distress. Call light in place, bed in lowest setting    2330: When performing assessment, patient grunted to questions such as \"my name is sahra, can you understand me? \". Squeezed fingers when asked. When asked to state name patient just yelled \"No\". Mirapex held as patient is unable to follow commands. 6072: Bedside and Verbal shift change report given to VALERIA Delarosa (oncoming nurse) by Arie Ozuna RN (offgoing nurse). Report included the following information SBAR, Kardex and MAR.

## 2018-02-16 NOTE — PROGRESS NOTES
Bedside and Verbal shift change report given to Michaelle Mccall RN (oncoming nurse) by Radha Winters RN BSN (offgoing nurse). Report included the following information SBAR, Kardex, Intake/Output, MAR and Recent Results.

## 2018-02-16 NOTE — PROGRESS NOTES
Assumed care of pt from [facility] pt awake in bed alert to self , no distress noted and denies pain. Frequently used items and call bell within reach. Will monitor for needs and assistance. Bed locked and in lowest position. Contact isolation for C-diff.

## 2018-02-16 NOTE — PROGRESS NOTES
conducted an initial consultation and Spiritual Assessment for Lorenzo Cheng, who is a 56 y. o.,male. Patients Primary Language is: [place]. According to the patients EMR Rastafarian Affiliation is: Jewish. The reason the Patient came to the hospital is:   Patient Active Problem List    Diagnosis Date Noted    Acute renal failure (ARF) ([facility] 02/14/2018    C. difficile colitis 02/03/2018    Traumatic brain injury ([facility] 02/03/2018    IDDM (insulin dependent diabetes mellitus) ([facility] 02/03/2018    Parkinson's disease ([facility] 02/03/2018    Psychiatric disorder 02/03/2018        The  provided the following Interventions:   attempted to initiated a relationship of care and support with patient in room 2102. Found Patient in enteric isolation but gowned up to go in. Found patient resting peacefully at this time due to his current condition. No family seen at this time. Provided information about Spiritual Care Services. Offered prayer and assurance of continued prayers on patients behalf. Assessment:  Patient does not have any Anabaptism/cultural needs that will affect patients preferences in health care. There are no further spiritual or Anabaptism issues which require Spiritual Care Services interventions at this time. Plan:  Chaplains will continue to follow and will provide pastoral care on an as needed/requested basis    . Dov Soria   Spiritual Care   [phone]

## 2018-02-16 NOTE — PROGRESS NOTES
Transportation at Discharge: 2/17/18 (Expected D/C)    [facility])  Reference number:  none    Estimated pick-up time: Placed on Will call     Method of Transport: Stretcher    Insurance Info: [facility]  Authorization: No auth required    Made D/C transportation arrangements at the request of Outcomes manager Tanesha Forbes.       Anthony Thacker, Care- ext [phone]

## 2018-02-16 NOTE — PROGRESS NOTES
Problem: Mobility Impaired (Adult and Pediatric)  Goal: *Acute Goals and Plan of Care (Insert Text)  Physical Therapy Goals  Initiated 2/16/2018 and to be accomplished within 7 day(s)  1. Patient will move from supine to sit and sit to supine , scoot up and down and roll side to side in bed with minimal assistance/contact guard assist.     2.  Patient will transfer from bed to chair and chair to bed with minimal assistance/contact guard assist using the least restrictive device. 3.  Patient will perform sit to stand with minimal assistance/contact guard assist.  4.  Patient will ambulate with minimal assistance/contact guard assist for 25 feet with the least restrictive device. Outcome: Progressing Towards Goal  physical Therapy EVALUATION    Patient: Marissa Carrera (56 y.o. male)  Date: 2/16/2018  Primary Diagnosis: Acute renal failure (ARF) ([facility])        Precautions:   Fall, Skin, Contact    PROBLEM LIST:  Patient presents with the following problems:   Bed Mobility, Transfers, Gait, Strength, Range of Motion, Balance, Precautions and Skin Integrity/Hygiene  ASSESSMENT :   Patient requires between moderate assistance  and maximal assistance for bed mobility, refusing to move reporting pain in mouth and mouth dry using paper towel to wipe mouth offered ice chips &  mouth swab declined both stating he can't swallow. Moving a little in bed with rom for both legs; lifting them  off bed against gravity no number given for pain . Education on plan of care exercise importance of mobility and safety needs reinforcement. Nursing informed left in bed with  Call light . Patient will benefit from skilled intervention to address the above impairments.   Patients rehabilitation potential is considered to be Fair  Factors which may influence rehabilitation potential include:   []         None noted  []         Mental ability/status  [x]         Medical condition  []         Home/family situation and support systems  []         Safety awareness  []         Pain tolerance/management  []         Other:      PLAN :  Recommendations and Planned Interventions:  [x]           Bed Mobility Training             [x]    Neuromuscular Re-Education  [x]           Transfer Training                   []    Orthotic/Prosthetic Training  [x]           Gait Training                          []    Modalities  [x]           Therapeutic Exercises          []    Edema Management/Control  [x]           Therapeutic Activities            [x]    Patient and Family Training/Education  []           Other (comment):    Frequency/Duration: Patient will be followed by physical therapy 3-5 times a week to address goals. Discharge Recommendations: Arya Ackerman  Further Equipment Recommendations for Discharge: N/A     SUBJECTIVE:   Patient stated .    OBJECTIVE DATA SUMMARY:     Past Medical History:   Diagnosis Date    Anxiety     Diabetes ([facility]     Ill-defined condition     hypotension    Psychiatric disorder     depression   History reviewed. No pertinent surgical history. Barriers to Learning/Limitations: yes;  physical and altered mental status (i.e.Sedation, Confusion)  Compensate with: visual, verbal, tactile, kinesthetic cues/model    G CODES:Mobility  Current  CN= 100%   Goal  CL= 60-79%. The severity rating is based on the Other [employer] Sitting Balance Scale0/5   [employer] Sitting Balance Scale0/5  0: Pt performs 25% or less of sitting activity (Max assist) CN, 100% impaired. 1: Pt supports self with upper extremities but requires therapist assistance. Pt performs 25-50% of effort (Mod assist) CM, 80% to <100% impaired. 1+: Pt supports self with upper extremities but requires therapist assistance. Pt performs >50% effort. (Min assist). CL, 60% to <80% impaired. 2: Pt supports self independently with both upper extremities. CL, 60% to <80% impaired.   2+: Pt support self independently with 1 upper extremity. CK, 40% to <60% impaired. 3: Pt sits without upper extremity support for up to 30 seconds. CK, 40% to <60% impaired. 3+: Pt sits without upper extremity support for 30 seconds or greater. CJ, 20% to <40% impaired. 4: Pt moves and returns trunkal midpoint 1-2 inches in one plane. CJ, 20% to <40% impaired. 4+: Pt moves and returns trunkal midpoint 1-2 inches in multiple planes. CI, 1% to <20% impaired. 5: Pt moves and returns trunkal midpoint in all planes greater than 2 inches. CH, 0% impaired. Eval Complexity: History: HIGH Complexity :3+ comorbidities / personal factors will impact the outcome/ POC Exam:LOW Complexity : 1-2 Standardized tests and measures addressing body structure, function, activity limitation and / or participation in recreation  Presentation: MEDIUM Complexity : Evolving with changing characteristics  Clinical Decision Making:Medium Complexity [facility] Sitting Balance Scale0/5 Overall Complexity:LOW     Prior Level of Function/Home Situation: uncertain of adl' living in [address]: [address] [place] [address] Name: [address] [place] [address]  Critical Behavior:  Neurologic State: Alert  Orientation Level: Oriented to person  Cognition: Decreased attention/concentration;Decreased command following  Safety/Judgement: Decreased awareness of environment;Decreased awareness of need for safety  Psychosocial  Patient Behaviors: Calm;Confused; Non-Compliant  Purposeful Interaction: No (comment)  Skin Condition/Temp: Dry;Flaky  Skin Integumentary  Skin Color: Ecchymosis (comment) (knees and elbows)  Skin Condition/Temp: Dry;Flaky  Turgor: Epidermis thin w/ loss of subcut tissue  Hair Growth: Sparce  Varicosities: Absent  Strength:    Strength: Generally decreased, functional (2+/5 lift off bed independently) both legs   Tone & Sensation:   Tone: Abnormal  Sensation:  (unable to accurately assess)  Range Of Motion:  AROM: Generally decreased, functional (both legs )  PROM: Within functional limits  Functional Mobility:  Bed Mobility:  Rolling:  (refused )  Supine to Sit:  (not assessed)  Transfers:  Sit to Stand:  (not assessed despite max encouragement)  Balance:   Sitting:  (not assessed; pt refused despite max encouragement)  Standing:  (not assessed)  Therapeutic Exercises:   aarom to both legs 5 reps each. Pain:  Pre treatment pain level:0/10 faces no number given. Post treatment pain level:0/10 no number given. Pain Scale 1: Numeric (0 - 10)  Pain Intensity 1: 0  Activity Tolerance:   Fair   Please refer to the flowsheet for vital signs taken during this treatment. After treatment:   []         Patient left in no apparent distress sitting up in chair  [x]         Patient left in no apparent distress in bed  [x]         Call bell left within reach  [x]         Nursing notified  []         Caregiver present  []         Bed alarm activated    COMMUNICATION/EDUCATION:   [x]         Fall prevention education was provided and the patient/caregiver indicated understanding. [x]         Patient/family have participated as able in goal setting and plan of care. [x]         Patient/family agree to work toward stated goals and plan of care. []         Patient understands intent and goals of therapy, but is neutral about his/her participation. []         Patient is unable to participate in goal setting and plan of care. Patient educated on the role of physical therapy during the acute stay  and the importance of mobility. VU needs reinforcement.  .      Thank you for this referral.  Davis Francisco, PT   Time Calculation: 20 mins

## 2018-02-16 NOTE — PROGRESS NOTES
Problem: Self Care Deficits Care Plan (Adult)  Goal: *Acute Goals and Plan of Care (Insert Text)  Occupational Therapy Goals  Initiated 2/16/2018 within 7 day(s). Will implement 3 day trial to determine pt's ability to actively participate with skilled therapy. 1.  Patient will perform grooming tasks at EOB with supervision for balance. 2.  Patient will perform lower body dressing with minimal assistance. 3.  Patient will perform functional task in standing for 8 minutes with supervision for safety to increase activity tolerance for ADLs & functional mobility. 4.  Patient will perform toilet transfers with minimal assistance. 5.  Patient will perform all aspects of toileting with minimal assistance. 6.  Patient will participate in upper extremity therapeutic exercise/activities with minimal assistance for 8 minutes to increase BUE strength for functional transfers & ADLs. 7.  Patient will utilize energy conservation techniques during functional activities with minimal verbal cues. Outcome: Progressing Towards Goal  Occupational Therapy EVALUATION    Patient: Morgan Mejía (89 y.o. male)  Date: 2/16/2018  Primary Diagnosis: Acute renal failure (ARF) (HCC)        Precautions:  Fall, Skin, Other (comment) (contact)  PLOF: Pt reports beginning therapy at [facility]; no clear details of PLOF provided. ASSESSMENT :  Based on the objective data described below, the patient presents with impairments with regard to bed mobility, activity tolerance, BUE function and independence in ADLs due to ARF. Pt sleeping on arrival, wakes to voice. Oriented to self only. Decreased attention; follows commands with additional time. Decreased AROM, strength & coordination of BUEs. Min/mod A & additional time for bed mobility; pt deferred further tx. Pt stating \"I'll throw up if I sit at the EOB,\" however, pt not reporting nausea currently. Pt refusing to work with therapy at end of session.  Educated on role of OT and POC; pt verbalizes understanding. Will implement 3 day trial to determine pt's ability to actively participate with therapy. Needs left within reach. Patient will benefit from skilled intervention to address the above impairments. Patients rehabilitation potential is considered to be Fair  Factors which may influence rehabilitation potential include:   []             None noted  []             Mental ability/status  [x]             Medical condition  []             Home/family situation and support systems  []             Safety awareness  []             Pain tolerance/management  []             Other:        PLAN :  Recommendations and Planned Interventions:  [x]               Self Care Training                  [x]        Therapeutic Activities  [x]               Functional Mobility Training    []        Cognitive Retraining  [x]               Therapeutic Exercises           [x]        Endurance Activities  [x]               Balance Training                   []        Neuromuscular Re-Education  []               Visual/Perceptual Training     [x]   Home Safety Training  [x]               Patient Education                 [x]        Family Training/Education  []               Other (comment):    Frequency/Duration: Patient will be followed by occupational therapy 3 times a week for a 3 day trial to address goals. Discharge Recommendations: Arya Ackerman  Further Equipment Recommendations for Discharge: TBD     SUBJECTIVE:   Patient stated Thank you.     OBJECTIVE DATA SUMMARY:     Past Medical History:   Diagnosis Date    Anxiety     Diabetes ([facility] [address]     Ill-defined condition     hypotension    Psychiatric disorder     depression   History reviewed. No pertinent surgical history.   Barriers to Learning/Limitations: yes;  altered mental status (i.e.Sedation, Confusion)  Compensate with: visual, verbal, tactile, kinesthetic cues/model    GCODES:  Self Care  Current  CK= 40-59%   Goal CJ= 20-39%. The severity rating is based on the Other Functional Assessment, MMT, ROM    Eval Complexity: History: MEDIUM Complexity : Expanded review of history including physical, cognitive and psychosocial  history ; Examination: MEDIUM Complexity : 3-5 performance deficits relating to physical, cognitive , or psychosocial skils that result in activity limitations and / or participation restrictions; Decision Making:MEDIUM Complexity : Patient may present with comorbidities that affect occupational performnce. Miniml to moderate modification of tasks or assistance (eg, physical or verbal ) with assesment(s) is necessary to enable patient to complete evaluation     Prior Level of Function/Home Situation: Pt reports beginning therapy at [facility]; no clear details of PLOF provided. Home Situation  Home Environment: [address] [place] [address] Name: [facility]  [x]  Right hand dominant   []  Left hand dominant    Cognitive/Behavioral Status:  Neurologic State: Alert  Orientation Level: Oriented to person;Disoriented to place; Disoriented to situation;Disoriented to time  Cognition: Decreased attention/concentration; Follows commands (follows commands with increased time)  Safety/Judgement: Awareness of environment     Skin: Intact (BUEs)  Edema: None noted (BUEs)    Vision/Perceptual:    Acuity:  (unable to accurately assess)      Coordination:  Coordination: Generally decreased, functional (BUEs; LUE < RUE)  Fine Motor Skills-Upper: Left Impaired;Right Intact (R generally decreased)    Gross Motor Skills-Upper: Right Intact; Left Intact     Balance:  Sitting:  (not assessed; pt refused despite max encouragement)  Standing:  (not assessed)    Strength:  Strength: Generally decreased, functional (BUEs: 2+/5 deltoids; 3 to 3+/5 biceps/triceps)    Tone & Sensation:  Tone: Abnormal (L hand)  Sensation:  (unable to accurately assess)    Range of Motion:  AROM: Generally decreased, functional (BUEs)  PROM: Within functional limits (BUEs)    Functional Mobility and Transfers for ADLs:  Bed Mobility:  Rolling: Minimum assistance; Moderate assistance; Additional time (secondary to decreased attention & cooperation)  Supine to Sit:  (not assessed)    Transfers:  Sit to Stand:  (not assessed despite max encouragement)    ADL Assessment:  Feeding: Moderate assistance  Oral Facial Hygiene/Grooming: Moderate assistance  Bathing: Maximum assistance  Upper Body Dressing: Moderate assistance  Lower Body Dressing: Maximum assistance  Toileting: Maximum assistance    Cognitive Retraining  Safety/Judgement: Awareness of environment    Therapeutic Exercise:  Pt educated on BUE TherEx; performed TherEx 1x with min A; noncompliant & refusing to continue TherEx. Pain:  Pre treatment pain level: 0/10  Post treatment pain level: 0/10  Pain Scale 1: Visual  Pain Intensity 1: 0    Activity Tolerance:  Poor+  Please refer to the flowsheet for vital signs taken during this treatment. After treatment:   [] Patient left in no apparent distress sitting up in chair  [x] Patient left in no apparent distress in bed  [x] Call bell left within reach  [x] Nursing notified  [] Caregiver present  [] Bed alarm activated    COMMUNICATION/EDUCATION: Pt educated on role of OT and POC. Verbalized understanding, needs reinforcement. [] Home safety education was provided and the patient/caregiver indicated understanding. [] Patient/family have participated as able in goal setting and plan of care. [] Patient/family agree to work toward stated goals and plan of care. [x] Patient understands intent and goals of therapy, but is neutral about his/her participation. [] Patient is unable to participate in goal setting and plan of care.     Thank you for this referral    Bethanie Nunez MS OTR/L  Time Calculation: 10 mins

## 2018-02-16 NOTE — PROGRESS NOTES
Spoke with rohan at [facility] they can accept back on sat . Transport set for will call. Pcs completed , envelope in nurses station.

## 2018-02-16 NOTE — ANCILLARY DISCHARGE INSTRUCTIONS
[facility] Readmission Patient Angelica Patel    The following concerns the patient's last admission and the events following discharge from the hospital:      Date of last hospital discharge:  2/08/18    Date of Readmission:  2/14/18    Reason for Readmission:  [facility] Patient    Were you kept informed about your diagnoses during your stay in the hospital and what was being done to further evaluate and treat them? [] None of time   [] Some of time   [] Most of time   [] All of time   At the time of your discharge, did someone talk to you about:  1. What your diagnoses were? 2. What tests or procedures needed to be done after you left? 3. What to watch out for regarding worsening of your disease? 4. What to do if you were experiencing worsening of your disease? 5. Who to contact (and how) if you were experiencing worsening of your disease? [] Yes  [] No  [] Not Sure   [] Yes  [] No  [] Not Sure   [] Yes  [] No  [] Not Sure   [] Yes  [] No  [] Not Sure   [] Yes  [] No  [] Not Sure   Were you asked about your understanding of these instructions? [] Yes  [] No  [] Not Sure   Were the discharge instructions written down and given to you before you left? [] Yes  [] No  [] Not Sure   Were the written discharge instructions and plans easy to read and understand? [] Yes  [] No  [] Not Sure   How confident were you about understanding these instructions? [] Very confident   [] Somewhat confident   [] Not confident   [] Not Sure   Do you have a regular doctor who takes care of you for most things? [] Yes  [] No  [] Not Sure   At the time of your discharge, did someone talk to you about which medications to take when you left and which ones to discontinue? [] Yes  [] No  [] Not Sure   Did you take your medications as they were prescribed? [x] Yes  [] No  [] Not Sure   If not, what were the difficulties you experienced with taking your medications?    Comment:          After you left the hospital, did you have an appointment with your doctor? [] Yes  [] No  [] Not Sure       If yes, who made the appointment? [] I did    [] My family   [] Hospital staff   [] Not Sure   Were you able to get to this appointment?    [] Yes  [] No  [] Not Sure   How do you think you became sick enough to be readmitted to the hospital?   Comment:          Source:  Modified from [facility], [place], [place]

## 2018-02-16 NOTE — PROGRESS NOTES
Problem: Falls - Risk of  Goal: *Absence of Falls  Document Ernie Fall Risk and appropriate interventions in the flowsheet.    Outcome: Progressing Towards Goal  Fall Risk Interventions:       Mentation Interventions: Door open when patient unattended    Medication Interventions: Evaluate medications/consider consulting pharmacy    Elimination Interventions: Bed/chair exit alarm

## 2018-02-16 NOTE — PROGRESS NOTES
Problem: Dysphagia (Adult)  Goal: *Acute Goals and Plan of Care (Insert Text)  Recommendations:  Diet: Tsp presentations of clear thin liquids only  Meds: IV  Aspiration Precautions  Oral Care TID  Other: pt requires feeding assistance for all po intake    Goals:  Patient will:  1. Tolerate PO trials with 0 s/s overt distress in 4/5 trials  2. Utilize compensatory swallow strategies/maneuvers (decrease bite/sip, size/rate, alt. liq/sol) with min cues in 4/5 trials  3. Perform oral-motor/laryngeal exercises to increase oropharyngeal swallow function with min cues  4. Complete an objective swallow study (i.e., MBSS) to assess swallow integrity, r/o aspiration, and determine of safest LRD, min A  Outcome: Not Progressing Towards Goal    Speech LAnguage Pathology bedside swallow   evaluation & TREATMENT     Patient: Artie Galindo (56 y.o. male)  Date: 2/16/2018  Primary Diagnosis: Acute renal failure (ARF) (HCC)        Precautions: Aspiration,  Fall, Skin, Other (comment) (contact)    ASSESSMENT :  Based on the objective data described below, the patient presents with mod-severe oropharyngeal dysphagia in the setting of general debility. Pt alert and oriented to self. Pt initially refused po trials, reporting food will make him sick. Pt w/ BUE weakness; accepted SLP-fed trials of thin liquid via tsp presentation and puree with max multimodal cues and ongoing encouragement for po acceptance, mastication, and swallow initiation. Pt demo severely decreased bolus prep/transport (~1min) of puree, delayed swallow initiation only with max multimodal cues. Pt also demo facial grimmace and tense, retracted body posture. Pt demo no s/s aspiration with tsp presentation of thin liquid. Pt demo inadequate sensory/motor awareness/strength for safe and adequate nutritional intake; considered high aspiration risk.  May need to consider PEG if unable to meet nutritional needs via po diet alone; however, preferred recommendation is QOL/comfort approach to nutrition due to multiple comorbidities. At this time, pt safest for tsp presentations of clear liquids only. D/w NP Darlene. Pt may benefit from palliative consult to address nutritional offerings. SLP will follow for diet tolerance and advancement as indicated. Skilled therapy initiated with pt education on safest diet to minimize aspiration risk and aspiration precautions; verbalized comprehension. Patient will benefit from skilled intervention to address the above impairments. Patients rehabilitation potential is considered to be Guarded  Factors which may influence rehabilitation potential include:   []            None noted  [x]            Mental ability/status  [x]            Medical condition  [x]            Home/family situation and support systems  [x]            Safety awareness  []            Pain tolerance/management  []            Other:      PLAN :  Recommendations and Planned Interventions:  Pt may benefit from palliative consult  Frequency/Duration: Patient will be followed by speech-language pathology 1-2 times per day/4-7 days per week to address goals. Discharge Recommendations: Skilled Nursing Facility     SUBJECTIVE:   Patient stated that's gonna make me sick. OBJECTIVE:     Past Medical History:   Diagnosis Date    Anxiety     Diabetes ([facility] [address]     Ill-defined condition     hypotension    Psychiatric disorder     depression   History reviewed. No pertinent surgical history.   Prior Level of Function/Home Situation: Transferred from [facility] [place]: [facility] Name: Rojelio Lynn  Diet prior to admission: unable to determine  Current Diet:  Tsp presentation of clear liquids only; pt requires assistance for po intake   Cognitive and Communication Status:  Neurologic State: Alert  Orientation Level: Disoriented to person  Cognition: Decreased attention/concentration, Decreased command following  Perception: Appears intact  Perseveration: No perseveration noted  Safety/Judgement: Decreased awareness of need for safety  Oral Assessment:  Oral Assessment  Labial: No impairment  Dentition: Limited;Natural  Oral Hygiene: fair  Lingual: Decreased rate;Decreased strength; Incoordinated  Velum: No impairment  Mandible: No impairment  Gag Reflex: No impairment  P.O. Trials:  Patient Position: [id number]  Vocal quality prior to P.O.: Hoarse  Consistency Presented: Puree; Thin liquid; Nectar thick liquid  How Presented: Self-fed/presented;Spoon     Bolus Acceptance: Impaired  Bolus Formation/Control: Impaired  Type of Impairment: Delayed;Mastication;Poor  Propulsion: Delayed (# of seconds); Discoordination; Tongue pumping  Oral Residue: Less than 10% of bolus  Initiation of Swallow: Delayed (# of seconds) (~ 1 min delay)  Laryngeal Elevation: Decreased  Aspiration Signs/Symptoms: Change vocal quality  Pharyngeal Phase Characteristics: Poor endurance  Effective Modifications: Spoon  Cues for Modifications: Maximal       Oral Phase Severity: Severe  Pharyngeal Phase Severity : Moderate-severe    GCODESwallowing:  Swallow Current Status CM= 80-99%   Swallow Goal Status CK= 40-59%    The severity rating is based on the following outcomes:  BRADY Noms Swallow Level 2    Clinical Judgement    PAIN:  Start of Eval/Tx: 0  End of Eval/Tx: 0     After treatment:   []            Patient left in no apparent distress sitting up in chair  [x]            Patient left in no apparent distress in bed  [x]            Call bell left within reach  [x]            Nursing notified  []            Family present  []            Caregiver present  []            Bed alarm activated    COMMUNICATION/EDUCATION:   [x]            Aspiration precautions; swallow safety; compensatory techniques. []            Patient/family have participated as able in goal setting and plan of care.   []            Patient/family agree to work toward stated goals and plan of care. []            Patient understands intent and goals of therapy; neutral about participation. [x]            Patient unable to participate in goal setting/plan of care; educ ongoing with interdisciplinary staff  []         Posted safety precautions in patient's room.     Thank you for this referral.  Tono Parham, SLP Intern  Evaluation Time: 10 minutes  Treatment Time: 10 minutes

## 2018-02-16 NOTE — DIABETES MGMT
NUTRITIONAL ASSESSMENT GLYCEMIC CONTROL/ PLAN OF CARE     Lorenzo Noriega           56 y.o.           2/14/2018                 1. Hypernatremia    2. SILVERIO (acute kidney injury) ([facility] [address]    3. Dehydration       INTERVENTIONS/PLAN:   1. Suggest lowering Lantus from 13 units/day to 4 units every 12 hours. 2.  For tube feeding, suggest Osmolite 1.2 francois, initial rate of 20 ml/hr increasing by 20 ml/hr every 8 hours to goal rate of 70 ml/hr to provide 2016 calories, 93 gram protein, ~ 1.4 L free water/d (from tube feeding). 3.  When IVF discontinued, suggest 150 ml water flushes every 6 hours. 4.  Monitor glycemic control, labs and weights. ASSESSMENT:   Nutritional Status:   Pt is 84% ideal wt with thin appearance, history of poor appetite with unintentional weight loss and dysphagia. Nutrition Diagnoses:    Inadequate oral food and beverage intake due to dysphagia in setting of Parkinson's disease as evidenced by no po intake at lunch today (2/16/18) and report of poor oral intake PTA. Unintentional weight loss due to decreased appetite as evidenced by reported 35 lb weight loss in past 6 months and 65 lb weight loss in past 1.5 years. Difficulty swallowing due to dysphagia in the setting of Parkinson's disease as evidenced by clear liquids by tsp only. Altered GI function due to C. Diff as evidenced by recent treatment w/ oral Vancomycin . Altered nutrition related labs due to T1DM as evidenced by BG of 7.5%. Diabetes Management:   Pt with T1DM and he reports his BG frequently goes [place] and low\" for no reason. He relates he is usually asymptomatic when his BG is low. Pt with relatively well controlled diabetes PTA  per A1C but this test may be misleading in event pt with brittle T1DM. Noted hypoglycemia this AM. Pt with unprovoked episodes of hypoglycemia at prior admission.      Suggest continuing basal insulin in pt with T1DM yet at much lower dose in pt at risk of asymptomatic hypoglycemia. Recent blood glucose:   2/16/18:  POC:  63/64, 138, 212 (lab at 0600 - 44)   2/15/18:  POC: 111, 254, 350 - pt received 28 units insulin (13 units Lantus and 15 units corrective lispro)  2/14/18: 400 - pt received 29 units insulin (14 units Levemir and 15 units corrective lispro)  Within target range (non-ICU: <140; ICU<180): [] Yes   [x]  No    Current Insulin regimen:   Lantus, 13 units/day  Corrective lispro normal insulin sensitivity ACHS   Home medication/insulin regimen:   Lantus 14 units every night  Novolog TID at meals, no dose stated and pt is unable to state dose  HbA1c: 7.5 % - ave BG has been ~ 165 mg/dL over past 3 months. Adequate glycemic control PTA:  [x] Yes  [] No       SUBJECTIVE/OBJECTIVE:   Information obtained from: chart review, pt, pt known from previous admission 2/6/18. On 2/6/18 (prior admission), pt reported that 1.5 years ago his usual weight was 180# and 6 months ago his weight was 150 #. He complained of having reflux, making his food taste bad. He also complained of his mouth being dry. Pt denies having food allergies and does not have problems with chewing. Today (2/16/18) pt reports he has been unable to take any clear liquids and is asking when his tube feeding will be started. Pt used to live with his brother but was discharged last week to [facility]. Pt was receiving pureed diet with thin liquids at prior admission (2/6/18). Pt admitted with hypernatremia, ARF (improving), poor po intake; PMHx includes T1DM, traumatic brain injury, Parkinson's disease, psychiatric disorder, recent C diff colitis. Diet: clear liquids by tsp only; No straws, feeding assistance per SLP  Zero po intake noted at lunch. No data found.     Medications: [x]                Reviewed   IVF:  D5 with 20 mEq KCl/L at 125 ml/hr (providing 510 calories/day)    Most Recent POC Glucose:   Recent Labs      02/16/18   0600  02/15/18   0700  02/14/18   1440   GLU  44*  89 376*         Labs:   Lab Results   Component Value Date/Time    Hemoglobin A1c 7.5 (H) 02/14/2018 02:40 PM     Lab Results   Component Value Date/Time    Sodium 160 (H) 02/16/2018 06:00 AM    Potassium 3.5 02/16/2018 06:00 AM    Chloride 122 (H) 02/16/2018 06:00 AM    CO2 30 02/16/2018 06:00 AM    Anion gap 8 02/16/2018 06:00 AM    Glucose 44 (LL) 02/16/2018 06:00 AM    BUN 18 02/16/2018 06:00 AM    Creatinine 0.65 02/16/2018 06:00 AM    Calcium 9.3 02/16/2018 06:00 AM    Magnesium 1.9 02/06/2018 09:05 AM    Albumin 2.8 (L) 02/14/2018 02:40 PM       Anthropometrics:  ,  , BMI (calculated): 19.4  Ideal wt - 142 lbs/64.6 kg  Wt Readings from Last 1 Encounters:   02/16/18 54.4 kg (119 lb 14.9 oz)      Ht Readings from Last 1 Encounters:   02/16/18 5' 6\" (1.676 m)     Last Weight Metrics:  Weight Loss Metrics 2/16/2018 2/8/2018 1/12/2018   Today's Wt 119 lb 14.9 oz 124 lb 116 lb   BMI 19.36 kg/m2 20.01 kg/m2 18.72 kg/m2       Estimated Nutrition Needs:   ,     1956 Kcals/day, Protein (g): 79 g Fluid (ml): 2000 ml (Bertis Guitar)  Based on:   [x]          Actual BW    []          ABW   []            Adjusted BW           Nutrition Interventions:  TF recommendations  Goal:   Blood glucose will be within target range of  mg/dL by 2/19/18. Provision of adequate nutrition by 2/21/18. Pt will maintain weight (+/- 1-2 kg) or gain weight (1-2#) by 2/26/18.         Nutrition Monitoring and Evaluation      [x]     Monitor po intake on meal rounds  [x]     Continue inpatient monitoring and intervention  []     Other:      Nutrition Risk:  [x]   High     []  Moderate    []  Minimal/Uncompromised    Fidencio To, [address] [place] [address], [facility]   Office:  [address] [place] [address] Pager:  [phone]

## 2018-02-16 NOTE — PROGRESS NOTES
[facility]  Hospitalist Division        Inpatient Daily Progress Note    Daily progress Note    Patient: Max Rodriguez MRN: [id number]  [facility]: [id number]    YOB: 1960  Age: 56 y.o. Sex: male    DOA: 2/14/2018 LOS:  LOS: 2 days                    Chief Complaint:  Acute Renal Failure, Hypernatremia    Interval History: 57 y/o [place] American male with hx of Parkinson's Disease, intracranial injury and mental disorder who presented to the ED on 2/14/2018 from [facility] for evaluation of poor oral intake and abnormal outpatient labs- Sodium 160, WBC 15. Work up in the ED showed normal WBC, negative UA, Sodium 160, creatinine 1.49, A1C 7.5%, blood cultures collected 2/14/2018 NGTD x 2, NEG Influenza A/B, CXR with linear opacity in paramediastinal left upper chest and peripheral left lung base, CT chest with hazy/groundglass opacities in RLL and scattered emphysematous changes. He has a hx of C-diff (positive on 2/3/2018) and was on oral Vancomycin. He was started on IVF's, sodium trend 160 - -> 166 - -> 160. Assessment/Plan:     Patient Active Problem List   Diagnosis Code    C. difficile colitis A04.72    Traumatic brain injury ([facility] S06. 9X5A    IDDM (insulin dependent diabetes mellitus) ([facility] E11.9, Z79.4    Parkinson's disease ([facility] G20    Psychiatric disorder F99    Acute renal failure (ARF) ([facility] N17.9       A/P:  Hypernatremia  - continue IVF's  - monitor BMP    Acute renal failure  - improved  - continue to monitor BMP    Hx of C-diff  - positive C-diff PCR 2/3/2018  - was on oral vancomycin PTA  - pt and staff deny diarrhea this admission, no recorded BM listed in flowsheet    Diabetes  - A1C 7.5% on admission  - monitor accuchecks, correctional SSI, Lantus 13 units nightly    PT/OT following  - recommend SNF    DVT Prophylaxis  - Lovenox daily    Disposition  - d/c back to [facility] when medically stable    Trinh Tian FNP-C  [address] [place] [address]  Hospitalist Division  Pager:  [phone]  Office:  [phone]        Subjective:      Wants something to eat/drink. Was lethargic yesterday and made NPO. More awake this morning, answers all questions appropriately. Denies abdominal pain, N/V/D. No events overnight. Objective:      Visit Vitals    /64 (BP 1 Location: Right arm, BP Patient Position: At rest)    Pulse 76    Temp 97.6 °F (36.4 °C)    Resp 15    SpO2 100%           Physical Exam:  General appearance: alert, cooperative, no distress, appears stated age  Head: does not open eyes, pt states he's never been able to  Lungs: clear to auscultation bilaterally  Heart: regular rate and rhythm, S1, S2 normal, no murmur, click, rub or gallop  Abdomen: soft, non tender, non distended. Normoactive bowel sounds.  No masses, no organomegaly  Extremities: extremities normal, atraumatic, no cyanosis or edema  Skin: Skin color, texture, turgor normal. No rashes or lesions  Neurologic: Grossly normal  PSY: flat affect         Intake and Output:  Current Shift:     Last three shifts:  02/14 1901 - 02/16 0700  In: 1130 [I.V.:1130]  Out: 1500 [Urine:1500]    Recent Results (from the past 24 hour(s))   GLUCOSE, POC    Collection Time: 02/15/18 11:44 AM   Result Value Ref Range    Glucose (POC) 111 (H) 70 - 110 mg/dL   GLUCOSE, POC    Collection Time: 02/15/18  5:40 PM   Result Value Ref Range    Glucose (POC) 254 (H) 70 - 110 mg/dL   GLUCOSE, POC    Collection Time: 02/15/18 10:29 PM   Result Value Ref Range    Glucose (POC) 350 (H) 70 - 381 mg/dL   METABOLIC PANEL, BASIC    Collection Time: 02/16/18  6:00 AM   Result Value Ref Range    Sodium 160 (H) 136 - 145 mmol/L    Potassium 3.5 3.5 - 5.5 mmol/L    Chloride 122 (H) 100 - 108 mmol/L    CO2 30 21 - 32 mmol/L    Anion gap 8 3.0 - 18 mmol/L    Glucose 44 (LL) 74 - 99 mg/dL    BUN 18 7.0 - 18 MG/DL    Creatinine 0.65 0.6 - 1.3 MG/DL    BUN/Creatinine ratio 28 (H) 12 - 20      GFR est AA >60 >60 ml/min/1.73m2    GFR est non-AA >60 >60 ml/min/1.73m2    Calcium 9.3 8.5 - 10.1 MG/DL   GLUCOSE, POC    Collection Time: 02/16/18  6:58 AM   Result Value Ref Range    Glucose (POC) 63 (L) 70 - 110 mg/dL   GLUCOSE, POC    Collection Time: 02/16/18  6:59 AM   Result Value Ref Range    Glucose (POC) 64 (L) 70 - 110 mg/dL   GLUCOSE, POC    Collection Time: 02/16/18  7:33 AM   Result Value Ref Range    Glucose (POC) 138 (H) 70 - 110 mg/dL           Lab Results   Component Value Date/Time    Glucose 44 (LL) 02/16/2018 06:00 AM    Glucose 89 02/15/2018 07:00 AM    Glucose 376 (H) 02/14/2018 02:40 PM    Glucose 29 (LL) 02/08/2018 04:58 AM    Glucose 347 (H) 02/07/2018 04:56 AM        Imaging:  No results found.     Medication List Reviewed:  Current Facility-Administered Medications   Medication Dose Route Frequency    insulin glargine (LANTUS) injection 13 Units  13 Units SubCUTAneous QHS    insulin lispro (HUMALOG) injection   SubCUTAneous Q6H    dextrose 5% with KCl 20 mEq/L infusion   IntraVENous CONTINUOUS    influenza vaccine 2017-18 (3 yrs+)(PF) (FLUZONE QUAD/FLUARIX QUAD) injection 0.5 mL  0.5 mL IntraMUSCular PRIOR TO DISCHARGE    ALPRAZolam (XANAX) tablet 0.25 mg  0.25 mg Oral Q8H PRN    citalopram (CELEXA) tablet 40 mg  40 mg Oral DAILY    modafinil (PROVIGIL) tablet 200 mg  200 mg Oral DAILY    pramipexole (MIRAPEX) tablet 0.125 mg  0.125 mg Oral TID    enoxaparin (LOVENOX) injection 40 mg  40 mg SubCUTAneous Q24H    glucose chewable tablet 16 g  4 Tab Oral PRN    glucagon (GLUCAGEN) injection 1 mg  1 mg IntraMUSCular PRN    dextrose (D50W) injection syrg 12.5-25 g  25-50 mL IntraVENous PRN

## 2018-02-17 VITALS
RESPIRATION RATE: 16 BRPM | WEIGHT: 119.93 LBS | OXYGEN SATURATION: 100 % | BODY MASS INDEX: 19.27 KG/M2 | TEMPERATURE: 98.9 F | HEART RATE: 76 BPM | DIASTOLIC BLOOD PRESSURE: 67 MMHG | HEIGHT: 66 IN | SYSTOLIC BLOOD PRESSURE: 114 MMHG

## 2018-02-17 LAB
ANION GAP SERPL CALC-SCNC: 7 MMOL/L (ref 3–18)
BASOPHILS # BLD: 0 K/UL (ref 0–0.06)
BASOPHILS NFR BLD: 1 % (ref 0–2)
BUN SERPL-MCNC: 14 MG/DL (ref 7–18)
BUN/CREAT SERPL: 29 (ref 12–20)
CALCIUM SERPL-MCNC: 8.4 MG/DL (ref 8.5–10.1)
CHLORIDE SERPL-SCNC: 110 MMOL/L (ref 100–108)
CO2 SERPL-SCNC: 30 MMOL/L (ref 21–32)
CREAT SERPL-MCNC: 0.48 MG/DL (ref 0.6–1.3)
DIFFERENTIAL METHOD BLD: ABNORMAL
EOSINOPHIL # BLD: 0 K/UL (ref 0–0.4)
EOSINOPHIL NFR BLD: 1 % (ref 0–5)
ERYTHROCYTE [DISTWIDTH] IN BLOOD BY AUTOMATED COUNT: 16.7 % (ref 11.6–14.5)
GLUCOSE SERPL-MCNC: 143 MG/DL (ref 74–99)
HCT VFR BLD AUTO: 29.5 % (ref 36–48)
HGB BLD-MCNC: 9.7 G/DL (ref 13–16)
LYMPHOCYTES # BLD: 1.8 K/UL (ref 0.9–3.6)
LYMPHOCYTES NFR BLD: 31 % (ref 21–52)
MCH RBC QN AUTO: 29.7 PG (ref 24–34)
MCHC RBC AUTO-ENTMCNC: 32.9 G/DL (ref 31–37)
MCV RBC AUTO: 90.2 FL (ref 74–97)
MONOCYTES # BLD: 0.3 K/UL (ref 0.05–1.2)
MONOCYTES NFR BLD: 5 % (ref 3–10)
NEUTS SEG # BLD: 3.6 K/UL (ref 1.8–8)
NEUTS SEG NFR BLD: 62 % (ref 40–73)
PLATELET # BLD AUTO: 167 K/UL (ref 135–420)
PMV BLD AUTO: 9.9 FL (ref 9.2–11.8)
POTASSIUM SERPL-SCNC: 3.7 MMOL/L (ref 3.5–5.5)
RBC # BLD AUTO: 3.27 M/UL (ref 4.7–5.5)
SODIUM SERPL-SCNC: 147 MMOL/L (ref 136–145)
WBC # BLD AUTO: 5.7 K/UL (ref 4.6–13.2)

## 2018-02-17 PROCEDURE — 74011250636 HC RX REV CODE- 250/636: Performed by: HOSPITALIST

## 2018-02-17 PROCEDURE — 82962 GLUCOSE BLOOD TEST: CPT

## 2018-02-17 PROCEDURE — 36415 COLL VENOUS BLD VENIPUNCTURE: CPT | Performed by: NURSE PRACTITIONER

## 2018-02-17 PROCEDURE — 74011636637 HC RX REV CODE- 636/637: Performed by: HOSPITALIST

## 2018-02-17 PROCEDURE — 85025 COMPLETE CBC W/AUTO DIFF WBC: CPT | Performed by: NURSE PRACTITIONER

## 2018-02-17 PROCEDURE — 74011250637 HC RX REV CODE- 250/637: Performed by: HOSPITALIST

## 2018-02-17 PROCEDURE — 80048 BASIC METABOLIC PNL TOTAL CA: CPT | Performed by: NURSE PRACTITIONER

## 2018-02-17 RX ADMIN — DEXTROSE MONOHYDRATE AND POTASSIUM CHLORIDE: 5; .149 INJECTION, SOLUTION INTRAVENOUS at 02:00

## 2018-02-17 RX ADMIN — INSULIN GLARGINE 4 UNITS: 100 INJECTION, SOLUTION SUBCUTANEOUS at 10:18

## 2018-02-17 RX ADMIN — INSULIN LISPRO 9 UNITS: 100 INJECTION, SOLUTION INTRAVENOUS; SUBCUTANEOUS at 10:05

## 2018-02-17 NOTE — DISCHARGE INSTRUCTIONS
DISCHARGE SUMMARY from Nurse    PATIENT INSTRUCTIONS:    After general anesthesia or intravenous sedation, for 24 hours or while taking prescription Narcotics:  · Limit your activities  · Do not drive and operate hazardous machinery  · Do not make important personal or business decisions  · Do  not drink alcoholic beverages  · If you have not urinated within 8 hours after discharge, please contact your surgeon on call. Report the following to your surgeon:  · Excessive pain, swelling, redness or odor of or around the surgical area  · Temperature over 100.5  · Nausea and vomiting lasting longer than 4 hours or if unable to take medications  · Any signs of decreased circulation or nerve impairment to extremity: change in color, persistent  numbness, tingling, coldness or increase pain  · Any questions    What to do at [facility]:  * Recommended activity: per Doctor at [facility]    * If you experience any of the following symptoms as listed in your teaching for Acute Renal Failure under \"When Should You Call For Help?\", please follow up with your doctor and/ or have nurse call 911. *  Please give a list of your current medications to your Primary Care Provider. *  Please update this list whenever your medications are discontinued, doses are      changed, or new medications (including over-the-counter products) are added. *  Please carry medication information at all times in case of emergency situations. These are general instructions for a healthy lifestyle:    No smoking/ No tobacco products/ Avoid exposure to second hand smoke  Surgeon General's Warning:  Quitting smoking now greatly reduces serious risk to your health.     Obesity, smoking, and sedentary lifestyle greatly increases your risk for illness    A healthy diet, regular physical exercise & weight monitoring are important for maintaining a healthy lifestyle    You may be retaining fluid if you have a history of heart failure or if you experience any of the following symptoms:  Weight gain of 3 pounds or more overnight or 5 pounds in a week, increased swelling in our hands or feet or shortness of breath while lying flat in bed. Please call your doctor as soon as you notice any of these symptoms; do not wait until your next office visit. Recognize signs and symptoms of STROKE:    F-face looks uneven    A-arms unable to move or move unevenly    S-speech slurred or non-existent    T-time-call 911 as soon as signs and symptoms begin-DO NOT go       Back to bed or wait to see if you get better-TIME IS BRAIN. Warning Signs of HEART ATTACK     Call 911 if you have these symptoms:   Chest discomfort. Most heart attacks involve discomfort in the center of the chest that lasts more than a few minutes, or that goes away and comes back. It can feel like uncomfortable pressure, squeezing, fullness, or pain.  Discomfort in other areas of the upper body. Symptoms can include pain or discomfort in one or both arms, the back, neck, jaw, or stomach.  Shortness of breath with or without chest discomfort.  Other signs may include breaking out in a cold sweat, nausea, or lightheadedness. Don't wait more than five minutes to call 911 - MINUTES MATTER! Fast action can save your life. Calling 911 is almost always the fastest way to get lifesaving treatment. Emergency Medical Services staff can begin treatment when they arrive -- up to an hour sooner than if someone gets to the hospital by car. Patient discharged without removing armband and transfered to another healthcare acute, sub acute , or extended care facility. Informed of privacy risks if armband lost or stolen     The discharge information has been reviewed with the {PATIENT PARENT GUARDIAN:62074}. The {PATIENT PARENT GUARDIAN:03631} verbalized understanding.     Discharge medications reviewed with the {Dishcarge meds reviewed VAOH:88980} and appropriate educational materials and side effects teaching were provided.   ___________________________________________________________________________________________________________________________________

## 2018-02-17 NOTE — PROGRESS NOTES
Progress Note      Patient: Ronit Hernandez               Sex: male          DOA: 2/14/2018       YOB: 1960      Age:  56 y.o.        LOS:  LOS: 3 days             CHIEF COMPLAINT:  Hyponatremia, improving    Subjective:     No distress    Objective:      Visit Vitals    /67 (BP 1 Location: Right arm, BP Patient Position: At rest)    Pulse 76    Temp 98.9 °F (37.2 °C)    Resp 16    Ht 5' 6\" (1.676 m)    Wt 54.4 kg (119 lb 14.9 oz)    SpO2 100%    BMI 19.36 kg/m2       Physical Exam:  Gen:  No distress, no complaint  Lungs:  Clear bilaterally, no wheeze or rhonchi  Heart:  Regular rate and rhythm, no murmurs or gallops  Abdomen:  Soft, non-tender, normal bowel sounds        Lab/Data Reviewed:  BMP:   Lab Results   Component Value Date/Time     (H) 02/17/2018 05:50 AM    K 3.7 02/17/2018 05:50 AM     (H) 02/17/2018 05:50 AM    CO2 30 02/17/2018 05:50 AM    AGAP 7 02/17/2018 05:50 AM     (H) 02/17/2018 05:50 AM    BUN 14 02/17/2018 05:50 AM    CREA 0.48 (L) 02/17/2018 05:50 AM    GFRAA >60 02/17/2018 05:50 AM    GFRNA >60 02/17/2018 05:50 AM     CBC:   Lab Results   Component Value Date/Time    WBC 5.7 02/17/2018 05:50 AM    HGB 9.7 (L) 02/17/2018 05:50 AM    HCT 29.5 (L) 02/17/2018 05:50 AM     02/17/2018 05:50 AM           Assessment/Plan     Principal Problem:    Acute renal failure (ARF) (HCC) (2/14/2018)    Active Problems:    IDDM (insulin dependent diabetes mellitus) ([facility] (2/3/2018)      Traumatic brain injury ([facility] (2/3/2018)      Psychiatric disorder (2/3/2018)      Parkinson's disease ([facility] (2/3/2018)        Plan:  Continue with IVF   Following sodium and renal function  Anticipate patient can return to SNF on Monday.

## 2018-02-17 NOTE — PROGRESS NOTES
Patient to be discharged to [facility], will call status. This nurse (Charge nurse) assisting Primary nurse. Called Luis Paniagua (DC Planner) said that Patient has been excepted to [facility] and to call Medical transporter for pickup time. Called [employer] spoke with Vance Pepper. pickup time scheduled for 11:30am. Primary nurse made aware. [id number]- Patient due for Influenza vaccine prior to discharge. [facility] to ask if Flu vaccine was previously given, spoke with Kun Madera., LPN (Staff nurse) said no record of flu vaccine given. 1054- Patient A&O x3, made aware of Flu vaccine to be given and refused said \"no, I'm not gonna take it. \"     8102- Dr. Davonte Soler was called T.O. order received to discharge Patient ([facility]).

## 2018-02-17 NOTE — ROUTINE PROCESS
Bedside and Verbal shift change report given to Cecelia Moss RN (oncoming nurse) by Laura Martinez RN (offgoing nurse). Report included the following information SBAR, Kardex, Intake/Output, MAR and Recent Results.

## 2018-02-17 NOTE — PROGRESS NOTES
Care Management Interventions  PCP Verified by CM: Yes  Palliative Care Criteria Met (RRAT>21 & CHF Dx)?: No  Mode of Transport at Discharge:  Other (see comment) (Brother will drive if after monday)  Transition of Care Consult (CM Consult): Discharge Planning  MyChart Signup: No  Discharge Durable Medical Equipment: No  Physical Therapy Consult: Yes  Occupational Therapy Consult: Yes  Speech Therapy Consult: No  Current Support Network: [address] [place] [address] ([facility])  Confirm Follow Up Transport: Other (see comment)  Plan discussed with Pt/Family/Caregiver: No  Discharge Location  Discharge Placement: Skilled nursing facility [facility])

## 2018-02-17 NOTE — PROGRESS NOTES
Response to query . No pt's respiratory failure isz resolved with bipap .  Treatment consists of treating the underlying cause o f the respiratory failure eg pneumonia etc

## 2018-02-17 NOTE — PROGRESS NOTES
Problem: Falls - Risk of  Goal: *Absence of Falls  Document Ernie Fall Risk and appropriate interventions in the flowsheet.    Outcome: Progressing Towards Goal  Fall Risk Interventions:       Mentation Interventions: Door open when patient unattended    Medication Interventions: Evaluate medications/consider consulting pharmacy    Elimination Interventions: Bed/chair exit alarm, Call light in reach

## 2018-02-17 NOTE — DISCHARGE SUMMARY
Discharge Summary    Patient: Karin Gee               Sex: male          DOA: 2/14/2018         YOB: 1960      Age:  56 y.o.        LOS:  LOS: 3 days                Admit Date: 2/14/2018    Discharge Date: 2/17/2018    Discharge Medications:     Discharge Medication List as of 2/17/2018  3:19 PM      CONTINUE these medications which have NOT CHANGED    Details   midodrine (PROAMITINE) 10 mg tablet Take 10 mg by mouth three (3) times daily (with meals). Indications: Hold for SBP > 130, Historical Med             ALPRAZolam (XANAX) 0.25 mg tablet Take 1 Tab by mouth every eight (8) hours as needed for Anxiety. Max Daily Amount: 0.75 mg., Print, Disp-10 Tab, R-0      citalopram (CELEXA) 40 mg tablet Take 1 Tab by mouth daily. Indications: ANXIETY WITH DEPRESSION, Print, Disp-10 Tab, R-0      modafinil (PROVIGIL) 200 mg tablet Take 200 mg by mouth daily. , Historical Med      nortriptyline (PAMELOR) 75 mg capsule Take 150 mg by mouth nightly. Indications: 150mg at dinner and 75mg at hs, Historical Med      pramipexole (MIRAPEX) 0.125 mg tablet Take 0.125 mg by mouth three (3) times daily. Indications: IDIOPATHIC PARKINSONISM, Historical Med      insulin glargine (LANTUS) 100 unit/mL injection 14 Units by SubCUTAneous route nightly., Historical Med      insulin aspart (NOVOLOG) 100 unit/mL injection by SubCUTAneous route Before breakfast, lunch, dinner and at bedtime.  Indications: type 1 diabetes mellitus, Historical Med         STOP VANC    Follow-up: PCP/ in facility physician    Discharge Condition: Stable    Activity: PT/OT Eval and Treat    Diet: Resume previous diet    Labs:  Labs: Results:       Chemistry Recent Labs      02/17/18   0550  02/16/18   1524  02/16/18   0600   GLU  143*  154*  44*   NA  147*  153*  160*   K  3.7  3.4*  3.5   CL  110*  116*  122*   CO2  30  30  30   BUN  14  17  18   CREA  0.48*  0.63  0.65   CA  8.4*  8.9  9.3   AGAP  7  7  8   BUCR  29*  27*  28*      CBC w/Diff Recent Labs      02/17/18   0550  02/16/18   0600  02/15/18   0700   WBC  5.7  6.7  6.8   RBC  3.27*  3.85*  4.15*   HGB  9.7*  11.5*  12.4*   HCT  29.5*  34.7*  36.6   PLT  167  233  327   GRANS  62  62  69   LYMPH  31  30  23   EOS  1  0  0      Cardiac Enzymes No results for input(s): CPK, CKND1, ROSCOE in the last 72 hours. No lab exists for component: CKRMB, TROIP   Coagulation No results for input(s): PTP, INR, APTT in the last 72 hours. No lab exists for component: INREXT    Lipid Panel No results found for: CHOL, CHOLPOCT, CHOLX, CHLST, CHOLV, 891244, HDL, LDL, LDLC, DLDLP, 229754, VLDLC, VLDL, TGLX, TRIGL, TRIGP, TGLPOCT, CHHD, CHHDX   BNP No results for input(s): BNPP in the last 72 hours. Liver Enzymes No results for input(s): TP, ALB, TBIL, AP, SGOT, GPT in the last 72 hours. No lab exists for component: DBIL   Thyroid Studies Lab Results   Component Value Date/Time    TSH 1.85 09/15/2011 05:20 AM          Imaging:      CT chest  Hazy/groundglass opacities in right lower lobe could findings nonspecific, but  very early infectious/inflammatory process not excluded.     Scattered emphysematous changes. Consults: None    Treatment Team: Treatment Team: Consulting Provider: Yara Duval MD; Utilization Review:  Spenser Fan RN; Care Manager: Jayna Dominguez RN; Primary Nurse: Juan A Cruz RN; Charge Nurse: Miguel Lopez RN    Significant Diagnostic Studies: labs:   Recent Results (from the past 24 hour(s))   GLUCOSE, POC    Collection Time: 02/16/18  5:35 PM   Result Value Ref Range    Glucose (POC) 117 (H) 70 - 110 mg/dL   GLUCOSE, POC    Collection Time: 02/16/18 11:09 PM   Result Value Ref Range    Glucose (POC) 257 (H) 70 - 512 mg/dL   METABOLIC PANEL, BASIC    Collection Time: 02/17/18  5:50 AM   Result Value Ref Range    Sodium 147 (H) 136 - 145 mmol/L    Potassium 3.7 3.5 - 5.5 mmol/L    Chloride 110 (H) 100 - 108 mmol/L    CO2 30 21 - 32 mmol/L    Anion gap 7 3.0 - 18 mmol/L Glucose 143 (H) 74 - 99 mg/dL    BUN 14 7.0 - 18 MG/DL    Creatinine 0.48 (L) 0.6 - 1.3 MG/DL    BUN/Creatinine ratio 29 (H) 12 - 20      GFR est AA >60 >60 ml/min/1.73m2    GFR est non-AA >60 >60 ml/min/1.73m2    Calcium 8.4 (L) 8.5 - 10.1 MG/DL   CBC WITH AUTOMATED DIFF    Collection Time: 02/17/18  5:50 AM   Result Value Ref Range    WBC 5.7 4.6 - 13.2 K/uL    RBC 3.27 (L) 4.70 - 5.50 M/uL    HGB 9.7 (L) 13.0 - 16.0 g/dL    HCT 29.5 (L) 36.0 - 48.0 %    MCV 90.2 74.0 - 97.0 FL    MCH 29.7 24.0 - 34.0 PG    MCHC 32.9 31.0 - 37.0 g/dL    RDW 16.7 (H) 11.6 - 14.5 %    PLATELET 804 621 - 003 K/uL    MPV 9.9 9.2 - 11.8 FL    NEUTROPHILS 62 40 - 73 %    LYMPHOCYTES 31 21 - 52 %    MONOCYTES 5 3 - 10 %    EOSINOPHILS 1 0 - 5 %    BASOPHILS 1 0 - 2 %    ABS. NEUTROPHILS 3.6 1.8 - 8.0 K/UL    ABS. LYMPHOCYTES 1.8 0.9 - 3.6 K/UL    ABS. MONOCYTES 0.3 0.05 - 1.2 K/UL    ABS. EOSINOPHILS 0.0 0.0 - 0.4 K/UL    ABS. BASOPHILS 0.0 0.0 - 0.06 K/UL    DF AUTOMATED           Discharge diagnoses:    Problem List as of 2/17/2018  Date Reviewed: 2/14/2018          Codes Class Noted - Resolved    * (Principal)Acute renal failure (ARF) ([facility] ICD-10-CM: N17.9  ICD-9-CM: 584.9  2/14/2018 - Present        C. difficile colitis ICD-10-CM: A04.72  ICD-9-CM: 008.45  2/3/2018 - Present        Traumatic brain injury ([facility] ICD-10-CM: S06. 9X9A  ICD-9-CM: 854.00  2/3/2018 - Present        IDDM (insulin dependent diabetes mellitus) ([facility] ICD-10-CM: E11.9, Z79.4  ICD-9-CM: 250.00, V58.67  2/3/2018 - Present        Parkinson's disease ([facility] ICD-10-CM: G20  ICD-9-CM: 332.0  2/3/2018 - Present        Psychiatric disorder ICD-10-CM: F99  ICD-9-CM: 300.9  2/3/2018 - Present            Hospital Course:  Major issues addressed during hospitalization outlined  below.       55 y/o [place] male with hx of Parkinson's Disease, intracranial injury and mental disorder who presented to the ED on 2/14/2018 from [facility] for evaluation of poor oral intake and abnormal outpatient labs- Sodium 160, WBC 15. Work up in the ED showed normal WBC, negative UA, Sodium 160, creatinine 1.49, A1C 7.5%, blood cultures collected 2/14/2018 NGTD x 2, NEG Influenza A/B, CXR with linear opacity in paramediastinal left upper chest and peripheral left lung base, CT chest with hazy/groundglass opacities in RLL and scattered emphysematous changes. He has a hx of C-diff (positive on 2/3/2018) and was on oral Vancomycin. He was started on IVF's, sodium trend 160 - -> 166 - -> 160. Na was 147 on day of dc almost in normal range. Cdiff test negative so vanc can be stopped. Creatinine normalized. He will need to avoid nephrotoxin and maintain good oral intake. Midodrine can be used for low bp if <415 systolic otherwise would not give this medication.   Safe for dc to [facility] on 2/17    Terry Javed MD  February 17, 2018        Total time spent 35 minutes

## 2018-02-17 NOTE — PROGRESS NOTES
1926: Assumed pt care. Received pt resting in bed, awake, alert to self and place. With delayed responses. Denies any pain at this time. No signs of distress. Bed on lowest position, wheels locked, call bell within reach. 2-    7423: Bedside and Verbal shift change report given to Gee Fry (oncoming nurse) by Lorraine Nugent   (offgoing nurse). Report included the following information SBAR, Kardex, Intake/Output, MAR and Recent Results.

## 2018-02-17 NOTE — PROGRESS NOTES
Assumed patient care. Received patient awake, alert. Patient denies any pain at this time. Bed is locked and in lowest position and call bell is within reach. Not in acute distress. Patient is ordered for D/C  to [facility] and [facility]. Report given to [address] [place] [address] Drive, [address], RN. Report included KARDEX, MAR, Labs. 1110   - Patient is transported to [facility] and Rehab via [facility].

## 2018-02-17 NOTE — PROGRESS NOTES
Response to query The patient indicates understanding of these issues and agrees with the plan.  The the pt is underweight with a bmi of 17

## 2018-02-18 LAB
GLUCOSE BLD STRIP.AUTO-MCNC: 281 MG/DL (ref 70–110)
GLUCOSE BLD STRIP.AUTO-MCNC: 63 MG/DL (ref 70–110)

## 2018-02-22 ENCOUNTER — HOSPITAL ENCOUNTER (INPATIENT)
Age: 58
LOS: 9 days | Discharge: NURSING, REHAB OR LONG-TERM CARE | DRG: 683 | End: 2018-03-03
Attending: EMERGENCY MEDICINE | Admitting: HOSPITALIST
Payer: MEDICARE

## 2018-02-22 DIAGNOSIS — Z71.89 ADVANCED CARE PLANNING/COUNSELING DISCUSSION: ICD-10-CM

## 2018-02-22 DIAGNOSIS — E87.0 HYPERNATREMIA: ICD-10-CM

## 2018-02-22 DIAGNOSIS — E86.0 DEHYDRATION: ICD-10-CM

## 2018-02-22 DIAGNOSIS — R10.84 ABDOMINAL PAIN, GENERALIZED: ICD-10-CM

## 2018-02-22 DIAGNOSIS — F41.9 ANXIETY: ICD-10-CM

## 2018-02-22 DIAGNOSIS — R53.81 DEBILITY: ICD-10-CM

## 2018-02-22 DIAGNOSIS — N17.9 ACUTE RENAL FAILURE, UNSPECIFIED ACUTE RENAL FAILURE TYPE (HCC): Primary | ICD-10-CM

## 2018-02-22 LAB
ALBUMIN SERPL-MCNC: 2.6 G/DL (ref 3.4–5)
ALBUMIN SERPL-MCNC: 2.9 G/DL (ref 3.4–5)
ALBUMIN/GLOB SERPL: 0.7 {RATIO} (ref 0.8–1.7)
ALBUMIN/GLOB SERPL: 0.8 {RATIO} (ref 0.8–1.7)
ALP SERPL-CCNC: 129 U/L (ref 45–117)
ALP SERPL-CCNC: 64 U/L (ref 45–117)
ALT SERPL-CCNC: 23 U/L (ref 16–61)
ALT SERPL-CCNC: 24 U/L (ref 16–61)
ANION GAP SERPL CALC-SCNC: 18 MMOL/L (ref 3–18)
ANION GAP SERPL CALC-SCNC: 7 MMOL/L (ref 3–18)
AST SERPL-CCNC: 14 U/L (ref 15–37)
AST SERPL-CCNC: 16 U/L (ref 15–37)
ATRIAL RATE: 102 BPM
BASOPHILS # BLD: 0 K/UL (ref 0–0.06)
BASOPHILS NFR BLD: 0 % (ref 0–2)
BILIRUB SERPL-MCNC: 0.1 MG/DL (ref 0.2–1)
BILIRUB SERPL-MCNC: 0.5 MG/DL (ref 0.2–1)
BUN SERPL-MCNC: 17 MG/DL (ref 7–18)
BUN SERPL-MCNC: 53 MG/DL (ref 7–18)
BUN/CREAT SERPL: 15 (ref 12–20)
BUN/CREAT SERPL: 31 (ref 12–20)
CALCIUM SERPL-MCNC: 10 MG/DL (ref 8.5–10.1)
CALCIUM SERPL-MCNC: 8.2 MG/DL (ref 8.5–10.1)
CALCULATED P AXIS, ECG09: 78 DEGREES
CALCULATED R AXIS, ECG10: 84 DEGREES
CALCULATED T AXIS, ECG11: 70 DEGREES
CHLORIDE SERPL-SCNC: 104 MMOL/L (ref 100–108)
CHLORIDE SERPL-SCNC: 110 MMOL/L (ref 100–108)
CO2 SERPL-SCNC: 25 MMOL/L (ref 21–32)
CO2 SERPL-SCNC: 29 MMOL/L (ref 21–32)
CREAT SERPL-MCNC: 1.12 MG/DL (ref 0.6–1.3)
CREAT SERPL-MCNC: 1.69 MG/DL (ref 0.6–1.3)
DIAGNOSIS, 93000: NORMAL
DIFFERENTIAL METHOD BLD: ABNORMAL
EOSINOPHIL # BLD: 0 K/UL (ref 0–0.4)
EOSINOPHIL NFR BLD: 0 % (ref 0–5)
ERYTHROCYTE [DISTWIDTH] IN BLOOD BY AUTOMATED COUNT: 17.2 % (ref 11.6–14.5)
GLOBULIN SER CALC-MCNC: 3.4 G/DL (ref 2–4)
GLOBULIN SER CALC-MCNC: 3.9 G/DL (ref 2–4)
GLUCOSE BLD STRIP.AUTO-MCNC: 367 MG/DL (ref 70–110)
GLUCOSE BLD STRIP.AUTO-MCNC: 379 MG/DL (ref 70–110)
GLUCOSE SERPL-MCNC: 105 MG/DL (ref 74–99)
GLUCOSE SERPL-MCNC: 400 MG/DL (ref 74–99)
HCT VFR BLD AUTO: 45.9 % (ref 36–48)
HGB BLD-MCNC: 14.9 G/DL (ref 13–16)
LACTATE BLD-SCNC: 2.6 MMOL/L (ref 0.4–2)
LYMPHOCYTES # BLD: 1.9 K/UL (ref 0.9–3.6)
LYMPHOCYTES NFR BLD: 19 % (ref 21–52)
MCH RBC QN AUTO: 30.7 PG (ref 24–34)
MCHC RBC AUTO-ENTMCNC: 32.5 G/DL (ref 31–37)
MCV RBC AUTO: 94.4 FL (ref 74–97)
MONOCYTES # BLD: 1 K/UL (ref 0.05–1.2)
MONOCYTES NFR BLD: 10 % (ref 3–10)
NEUTS SEG # BLD: 7.2 K/UL (ref 1.8–8)
NEUTS SEG NFR BLD: 71 % (ref 40–73)
P-R INTERVAL, ECG05: 122 MS
PH BLDV: 7.37 [PH] (ref 7.32–7.42)
PLATELET # BLD AUTO: 325 K/UL (ref 135–420)
PMV BLD AUTO: 10.8 FL (ref 9.2–11.8)
POTASSIUM SERPL-SCNC: 4.4 MMOL/L (ref 3.5–5.5)
POTASSIUM SERPL-SCNC: 5.1 MMOL/L (ref 3.5–5.5)
PROT SERPL-MCNC: 6 G/DL (ref 6.4–8.2)
PROT SERPL-MCNC: 6.8 G/DL (ref 6.4–8.2)
Q-T INTERVAL, ECG07: 342 MS
QRS DURATION, ECG06: 80 MS
QTC CALCULATION (BEZET), ECG08: 445 MS
RBC # BLD AUTO: 4.86 M/UL (ref 4.7–5.5)
SODIUM SERPL-SCNC: 140 MMOL/L (ref 136–145)
SODIUM SERPL-SCNC: 153 MMOL/L (ref 136–145)
VENTRICULAR RATE, ECG03: 102 BPM
WBC # BLD AUTO: 10.1 K/UL (ref 4.6–13.2)

## 2018-02-22 PROCEDURE — 36415 COLL VENOUS BLD VENIPUNCTURE: CPT | Performed by: NURSE PRACTITIONER

## 2018-02-22 PROCEDURE — 74011250637 HC RX REV CODE- 250/637: Performed by: NURSE PRACTITIONER

## 2018-02-22 PROCEDURE — 74011250636 HC RX REV CODE- 250/636: Performed by: EMERGENCY MEDICINE

## 2018-02-22 PROCEDURE — 93005 ELECTROCARDIOGRAM TRACING: CPT

## 2018-02-22 PROCEDURE — 82800 BLOOD PH: CPT | Performed by: EMERGENCY MEDICINE

## 2018-02-22 PROCEDURE — 77030011943

## 2018-02-22 PROCEDURE — 74011250636 HC RX REV CODE- 250/636: Performed by: NURSE PRACTITIONER

## 2018-02-22 PROCEDURE — 36593 DECLOT VASCULAR DEVICE: CPT

## 2018-02-22 PROCEDURE — 65270000029 HC RM PRIVATE

## 2018-02-22 PROCEDURE — 87040 BLOOD CULTURE FOR BACTERIA: CPT | Performed by: EMERGENCY MEDICINE

## 2018-02-22 PROCEDURE — 87186 SC STD MICRODIL/AGAR DIL: CPT | Performed by: EMERGENCY MEDICINE

## 2018-02-22 PROCEDURE — 99285 EMERGENCY DEPT VISIT HI MDM: CPT

## 2018-02-22 PROCEDURE — 74011250636 HC RX REV CODE- 250/636

## 2018-02-22 PROCEDURE — 77010033678 HC OXYGEN DAILY

## 2018-02-22 PROCEDURE — 74011636637 HC RX REV CODE- 636/637: Performed by: NURSE PRACTITIONER

## 2018-02-22 PROCEDURE — 83605 ASSAY OF LACTIC ACID: CPT

## 2018-02-22 PROCEDURE — 80053 COMPREHEN METABOLIC PANEL: CPT | Performed by: EMERGENCY MEDICINE

## 2018-02-22 PROCEDURE — 82962 GLUCOSE BLOOD TEST: CPT

## 2018-02-22 PROCEDURE — 87077 CULTURE AEROBIC IDENTIFY: CPT | Performed by: EMERGENCY MEDICINE

## 2018-02-22 PROCEDURE — 85025 COMPLETE CBC W/AUTO DIFF WBC: CPT | Performed by: EMERGENCY MEDICINE

## 2018-02-22 PROCEDURE — 80053 COMPREHEN METABOLIC PANEL: CPT | Performed by: NURSE PRACTITIONER

## 2018-02-22 PROCEDURE — 51702 INSERT TEMP BLADDER CATH: CPT

## 2018-02-22 RX ORDER — ONDANSETRON 2 MG/ML
4 INJECTION INTRAMUSCULAR; INTRAVENOUS
Status: DISCONTINUED | OUTPATIENT
Start: 2018-02-22 | End: 2018-03-03 | Stop reason: HOSPADM

## 2018-02-22 RX ORDER — MAGNESIUM SULFATE 100 %
4 CRYSTALS MISCELLANEOUS AS NEEDED
Status: DISCONTINUED | OUTPATIENT
Start: 2018-02-22 | End: 2018-03-03 | Stop reason: HOSPADM

## 2018-02-22 RX ORDER — MIDODRINE HYDROCHLORIDE 2.5 MG/1
10 TABLET ORAL
Status: DISCONTINUED | OUTPATIENT
Start: 2018-02-22 | End: 2018-03-03 | Stop reason: HOSPADM

## 2018-02-22 RX ORDER — MODAFINIL 100 MG/1
200 TABLET ORAL DAILY
Status: DISCONTINUED | OUTPATIENT
Start: 2018-02-23 | End: 2018-03-03 | Stop reason: HOSPADM

## 2018-02-22 RX ORDER — INSULIN GLARGINE 100 [IU]/ML
14 INJECTION, SOLUTION SUBCUTANEOUS
Status: DISCONTINUED | OUTPATIENT
Start: 2018-02-22 | End: 2018-02-23

## 2018-02-22 RX ORDER — PRAMIPEXOLE DIHYDROCHLORIDE 0.25 MG/1
0.12 TABLET ORAL 3 TIMES DAILY
Status: DISCONTINUED | OUTPATIENT
Start: 2018-02-22 | End: 2018-03-03 | Stop reason: HOSPADM

## 2018-02-22 RX ORDER — ACETAMINOPHEN 325 MG/1
650 TABLET ORAL
Status: DISCONTINUED | OUTPATIENT
Start: 2018-02-22 | End: 2018-03-03 | Stop reason: HOSPADM

## 2018-02-22 RX ORDER — CITALOPRAM 20 MG/1
40 TABLET, FILM COATED ORAL DAILY
Status: DISCONTINUED | OUTPATIENT
Start: 2018-02-23 | End: 2018-03-03 | Stop reason: HOSPADM

## 2018-02-22 RX ORDER — INSULIN LISPRO 100 [IU]/ML
INJECTION, SOLUTION INTRAVENOUS; SUBCUTANEOUS
Status: DISCONTINUED | OUTPATIENT
Start: 2018-02-22 | End: 2018-02-28

## 2018-02-22 RX ORDER — SODIUM CHLORIDE 9 MG/ML
75 INJECTION, SOLUTION INTRAVENOUS CONTINUOUS
Status: DISCONTINUED | OUTPATIENT
Start: 2018-02-22 | End: 2018-02-25

## 2018-02-22 RX ORDER — NORTRIPTYLINE HYDROCHLORIDE 25 MG/1
150 CAPSULE ORAL
Status: DISCONTINUED | OUTPATIENT
Start: 2018-02-22 | End: 2018-03-03 | Stop reason: HOSPADM

## 2018-02-22 RX ORDER — DEXTROSE 50 % IN WATER (D50W) INTRAVENOUS SYRINGE
25-50 AS NEEDED
Status: DISCONTINUED | OUTPATIENT
Start: 2018-02-22 | End: 2018-03-03 | Stop reason: HOSPADM

## 2018-02-22 RX ADMIN — INSULIN GLARGINE 14 UNITS: 100 INJECTION, SOLUTION SUBCUTANEOUS at 22:13

## 2018-02-22 RX ADMIN — ALTEPLASE 2 MG: 2.2 INJECTION, POWDER, LYOPHILIZED, FOR SOLUTION INTRAVENOUS at 11:37

## 2018-02-22 RX ADMIN — SODIUM CHLORIDE 75 ML/HR: 900 INJECTION, SOLUTION INTRAVENOUS at 16:07

## 2018-02-22 RX ADMIN — SODIUM CHLORIDE 2000 ML: 900 INJECTION, SOLUTION INTRAVENOUS at 14:39

## 2018-02-22 RX ADMIN — SODIUM CHLORIDE 500 ML: 900 INJECTION, SOLUTION INTRAVENOUS at 11:44

## 2018-02-22 RX ADMIN — INSULIN LISPRO 10 UNITS: 100 INJECTION, SOLUTION INTRAVENOUS; SUBCUTANEOUS at 22:13

## 2018-02-22 NOTE — ED NOTES
TRANSFER - OUT REPORT:    Lorenzo Noriega  being transferred to [facility](unit) for routine progression of care       Report consisted of patients Situation, Background, Assessment and   Recommendations(SBAR). Information from the following report(s) SBAR, ED Summary, Intake/Output, MAR, Recent Results and Med Rec Status was reviewed with the receiving nurse. Lines:   Peripheral IV 02/22/18 Left Antecubital (Active)   Site Assessment Clean, dry, & intact 2/22/2018 11:30 AM   Phlebitis Assessment 0 2/22/2018 11:30 AM   Infiltration Assessment 0 2/22/2018 11:30 AM   Dressing Status Clean, dry, & intact 2/22/2018 11:30 AM        Opportunity for questions and clarification was provided.       Patient transported with:   O2 @ 2 liters  Tech

## 2018-02-22 NOTE — PROGRESS NOTES
completed the initial Spiritual Assessment of the patient in bed 11 of the emergency room, and offered Pastoral Care support. Patient does not have any Advent/cultural needs that will affect patients preferences in health care.  Chaplains will continue to follow and will provide pastoral care on an as needed/requested basis     Chaplain Fan Hdez   Board Certified [address] [place] [address]   [phone]

## 2018-02-22 NOTE — ED NOTES
Unable to aspirate back on midline with CATHFLO in place. MD aware. Will attempt to aspirate back again in 120 minutes per medication instructions. Still unable to obtain second set of blood cultures at this time. MD aware.

## 2018-02-22 NOTE — ED TRIAGE NOTES
Pt arrived via EMS from [facility] with c/o \"abnormal lab values\" and \"high blood sugar. \" Upon RN initial inspection pt is tachycardic, hypotensive, and tachypneic. Pt appears in generally poor health and extremely thin. The Sepsis Screening has been completed on arrival in the Emergency Department.     Vital signs:  Patient Vitals for the past 4 hrs:   Temp Pulse Resp BP SpO2   02/22/18 1118 98.8 °F (37.1 °C) (!) 117 28 (!) 75/56 100 %            =monitored (data validate)  MAP (Calculated): (!) 62    =calculated (manual entry)    Is patient is 31y/o or older, meets 2 or more ABNORMAL VITAL SIGNS below, associated with SUSPECTED INFECTION?  YES     Temperature < 96.8°F (36°C) or > 100.9°F (38.3°C)   Heart Rate > 90 beats per minute   Respiratory Rate > 20 beats per minute   BP < 90 systolic    MAP < 65    IF ANSWER IS YES, CALL A CODE SEPSIS  POC LACTIC ACID ASAP AND BEGIN NURSE DRIVEN SEPSIS ORDERS WHILE AWAITING PROVIDER

## 2018-02-22 NOTE — ED NOTES
Pt straight cathed per MD order. Only visible urine in tip of tube. MD aware. Order placed for stout catheter for strict I&O. Stout catheter placed per MD order. Pt tolerated well. No urine output noted. MD aware. Will continue to monitor.

## 2018-02-22 NOTE — H&P
GENERAL GENERIC H&P/CONSULT    Lorenzo Walker is a 56 y.o. male with history of DM and Dementia, C-Diff (treated with PO vanc), Parkinson's, TBI who presents to the ED from [facility]. Patient apparently had abnormal labs however unclear of actual lab values but apparently NA and BG was elevated. Patient apparently has been having poor PO intake. Patient was just discharged 2/17/18 for ARF along with hypernatremia. Patient presents back here again with noted worsening renal issues along with hypernatremia and hypotension. Hx limited 2/2 patient condition. Patient will be abdmitted for further evaluation. Past Medical History:   Diagnosis Date    Anxiety     Diabetes ([address]     Ill-defined condition     hypotension    Psychiatric disorder     depression      History reviewed. No pertinent surgical history. Prior to Admission medications    Medication Sig Start Date End Date Taking? Authorizing Provider   midodrine (PROAMITINE) 10 mg tablet Take 10 mg by mouth three (3) times daily (with meals). Indications: Hold for SBP > 130    Historical Provider   vancomycin 50 mg/mL oral solution (compounded) Take 2.5 mL by mouth every six (6) hours. 125 mg po qid x 12 days,then 125 mg po bid x 7 days;then 125 mg po daily x 7 days;then 125 mg po every other day x 7 days;then 125 mg po q 3 days x 14 days 2/8/18   Raman Tovar MD   ALPRAZolam Chriss Craw) 0.25 mg tablet Take 1 Tab by mouth every eight (8) hours as needed for Anxiety. Max Daily Amount: 0.75 mg. 2/8/18   Raman Tovar MD   citalopram (CELEXA) 40 mg tablet Take 1 Tab by mouth daily. Indications: ANXIETY WITH DEPRESSION 2/8/18   Raman Tovar MD   modafinil (PROVIGIL) 200 mg tablet Take 200 mg by mouth daily. Courtney Dash MD   nortriptyline (PAMELOR) 75 mg capsule Take 150 mg by mouth nightly. Indications: 150mg at dinner and 75mg at hs    Courtney Dash MD   pramipexole (MIRAPEX) 0.125 mg tablet Take 0.125 mg by mouth three (3) times daily. Indications: IDIOPATHIC PARKINSONISM    Courtney Dash MD   insulin glargine (LANTUS) 100 unit/mL injection 14 Units by SubCUTAneous route nightly. Courtney Dash MD   insulin aspart (NOVOLOG) 100 unit/mL injection by SubCUTAneous route Before breakfast, lunch, dinner and at bedtime. Indications: type 1 diabetes mellitus    Courtney Dash MD     No Known Allergies   Social History   Substance Use Topics    Smoking status: Former Smoker    Smokeless tobacco: Never Used    Alcohol use No      History reviewed. No pertinent family history. Review of Systems   Unable to perform ROS: Dementia       Objective:          Patient Vitals for the past 8 hrs:   BP Temp Pulse Resp SpO2 Height Weight   02/22/18 1315 112/71 - 80 16 - - -   02/22/18 1300 99/65 - (!) 103 20 - - -   02/22/18 1245 105/73 - 92 14 - - -   02/22/18 1230 108/81 - 92 15 - - -   02/22/18 1215 106/73 - 93 19 - - -   02/22/18 1200 104/73 - 95 30 - - -   02/22/18 1145 92/69 - (!) 103 26 - - -   02/22/18 1130 91/65 - (!) 108 30 - - -   02/22/18 1118 (!) 75/56 98.8 °F (37.1 °C) (!) 117 28 100 % 5' 6\" (1.676 m) 45.4 kg (100 lb)   02/22/18 1115 (!) 75/56 - - - - - -     Physical Exam   Constitutional: He appears cachectic. No distress. HENT:   Head: Normocephalic. Eyes: Pupils are equal, round, and reactive to light. Neck: Normal range of motion. Cardiovascular: Normal rate. Pulmonary/Chest: Effort normal and breath sounds normal.   Abdominal: Soft. Bowel sounds are normal. He exhibits no distension. Neurological: He is alert. Skin: He is not diaphoretic.         Labs:    Recent Results (from the past 24 hour(s))   POC LACTIC ACID    Collection Time: 02/22/18 11:29 AM   Result Value Ref Range    Lactic Acid (POC) 2.6 (HH) 0.4 - 2.0 mmol/L   CBC WITH AUTOMATED DIFF    Collection Time: 02/22/18 11:43 AM   Result Value Ref Range    WBC 10.1 4.6 - 13.2 K/uL    RBC 4.86 4.70 - 5.50 M/uL    HGB 14.9 13.0 - 16.0 g/dL    HCT 45.9 36.0 - 48.0 %    MCV 94.4 74.0 - 97.0 FL    MCH 30.7 24.0 - 34.0 PG    MCHC 32.5 31.0 - 37.0 g/dL    RDW 17.2 (H) 11.6 - 14.5 %    PLATELET 340 932 - 486 K/uL    MPV 10.8 9.2 - 11.8 FL    NEUTROPHILS 71 40 - 73 %    LYMPHOCYTES 19 (L) 21 - 52 %    MONOCYTES 10 3 - 10 %    EOSINOPHILS 0 0 - 5 %    BASOPHILS 0 0 - 2 %    ABS. NEUTROPHILS 7.2 1.8 - 8.0 K/UL    ABS. LYMPHOCYTES 1.9 0.9 - 3.6 K/UL    ABS. MONOCYTES 1.0 0.05 - 1.2 K/UL    ABS. EOSINOPHILS 0.0 0.0 - 0.4 K/UL    ABS. BASOPHILS 0.0 0.0 - 0.06 K/UL    DF AUTOMATED     METABOLIC PANEL, COMPREHENSIVE    Collection Time: 02/22/18 11:43 AM   Result Value Ref Range    Sodium 153 (H) 136 - 145 mmol/L    Potassium 5.1 3.5 - 5.5 mmol/L    Chloride 110 (H) 100 - 108 mmol/L    CO2 25 21 - 32 mmol/L    Anion gap 18 3.0 - 18 mmol/L    Glucose 400 (H) 74 - 99 mg/dL    BUN 53 (H) 7.0 - 18 MG/DL    Creatinine 1.69 (H) 0.6 - 1.3 MG/DL    BUN/Creatinine ratio 31 (H) 12 - 20      GFR est AA 51 (L) >60 ml/min/1.73m2    GFR est non-AA 42 (L) >60 ml/min/1.73m2    Calcium 10.0 8.5 - 10.1 MG/DL    Bilirubin, total 0.5 0.2 - 1.0 MG/DL    ALT (SGPT) 24 16 - 61 U/L    AST (SGOT) 14 (L) 15 - 37 U/L    Alk.  phosphatase 129 (H) 45 - 117 U/L    Protein, total 6.8 6.4 - 8.2 g/dL    Albumin 2.9 (L) 3.4 - 5.0 g/dL    Globulin 3.9 2.0 - 4.0 g/dL    A-G Ratio 0.7 (L) 0.8 - 1.7     CULTURE, BLOOD    Collection Time: 02/22/18 11:43 AM   Result Value Ref Range    Special Requests: PERIPHERAL      Culture result: PENDING    PH, VENOUS    Collection Time: 02/22/18 11:43 AM   Result Value Ref Range    VENOUS PH 7.37 7.32 - 7.42     EKG, 12 LEAD, INITIAL    Collection Time: 02/22/18 11:47 AM   Result Value Ref Range    Ventricular Rate 102 BPM    Atrial Rate 102 BPM    P-R Interval 122 ms    QRS Duration 80 ms    Q-T Interval 342 ms    QTC Calculation (Bezet) 445 ms    Calculated P Axis 78 degrees    Calculated R Axis 84 degrees    Calculated T Axis 70 degrees    Diagnosis       Sinus tachycardia  Otherwise normal ECG  When compared with ECG of 14-FEB-2018 11:17,  T wave inversion no longer evident in Anterior leads             Assessment:  Principal Problem:    Acute renal failure (ARF) ([facility] [address] (2/14/2018)    Active Problems:    Traumatic brain injury ([facility] [address] (2/3/2018)      IDDM (insulin dependent diabetes mellitus) ([facility] [address] (2/3/2018)      Parkinson's disease ([facility] [address] (2/3/2018)      Psychiatric disorder (2/3/2018)        Plan:    Acute Renal Failure  -2/2 dehydration  -noted BUN/Creat ratio  -IVF for now  -[id number] weight and I&O's    Hypovolemic Hypernatermia  -serial BMP  -NS for now    Hypotension  -2/2 dehydration  -do not suspect infectious etiology  -blood cultures done  -improved with IVF  -hold abx for now  -midodrine  -UA ok    Deconditioning  -PT/OT    IDDM  -corrective insulin  -basal dosing    Psych disorder/Parkinsons  -mirapex  -celexa    Hx of C-diff  -S/p completion of po vanc  -last negative for c-diff    DVT prophylaxis  -scd/teds  -heparin sq      Signed:  Rissa Siddiqi NP 2/22/2018

## 2018-02-22 NOTE — ED PROVIDER NOTES
EMERGENCY DEPARTMENT HISTORY AND PHYSICAL EXAM    12:11 PM      Date: 2/22/2018  Patient Name: Charbel Vieira    History of Presenting Illness     Chief Complaint   Patient presents with    Abnormal Lab Results         History Provided By: Nurse    Chief Complaint: Abnormal labs  Duration:  Unknown  Timing:  Acute  Location: Unknown  Quality: N/A  Severity: Moderate  Modifying Factors: N/A  Associated Symptoms: N.A      Additional History (Context): 12:12 PM Charbel Vieira is a 56 y.o. male with h/o DM and Dementia who presents to ED complaining of acute moderate abnormal labs onset unkown. Per nurse, he was discharged for renal failure on the 7th. His sodium is elevated to 150 per EMS. Hx limited due to dementia. PCP: Nelda Simpson MD    Current Facility-Administered Medications   Medication Dose Route Frequency Provider Last Rate Last Dose    alteplase (CATHFLO) injection 2 mg  2 mg InterCATHeter ONCE Rosa Espinosa MD        sodium chloride 0.9 % bolus infusion 2,000 mL  2,000 mL IntraVENous ONCE Rosa Espinosa MD         Current Outpatient Prescriptions   Medication Sig Dispense Refill    midodrine (PROAMITINE) 10 mg tablet Take 10 mg by mouth three (3) times daily (with meals). Indications: Hold for SBP > 130      vancomycin 50 mg/mL oral solution (compounded) Take 2.5 mL by mouth every six (6) hours. 125 mg po qid x 12 days,then 125 mg po bid x 7 days;then 125 mg po daily x 7 days;then 125 mg po every other day x 7 days;then 125 mg po q 3 days x 14 days 1 Bottle 0    ALPRAZolam (XANAX) 0.25 mg tablet Take 1 Tab by mouth every eight (8) hours as needed for Anxiety. Max Daily Amount: 0.75 mg. 10 Tab 0    citalopram (CELEXA) 40 mg tablet Take 1 Tab by mouth daily. Indications: ANXIETY WITH DEPRESSION 10 Tab 0    modafinil (PROVIGIL) 200 mg tablet Take 200 mg by mouth daily.  nortriptyline (PAMELOR) 75 mg capsule Take 150 mg by mouth nightly.  Indications: 150mg at dinner and 75mg at hs      pramipexole (MIRAPEX) 0.125 mg tablet Take 0.125 mg by mouth three (3) times daily. Indications: IDIOPATHIC PARKINSONISM      insulin glargine (LANTUS) 100 unit/mL injection 14 Units by SubCUTAneous route nightly.  insulin aspart (NOVOLOG) 100 unit/mL injection by SubCUTAneous route Before breakfast, lunch, dinner and at bedtime. Indications: type 1 diabetes mellitus         Past History     Past Medical History:  Past Medical History:   Diagnosis Date    Anxiety     [facility] [address] [address]     Ill-defined condition     hypotension    Psychiatric disorder     depression       Past Surgical History:  History reviewed. No pertinent surgical history. Family History:  History reviewed. No pertinent family history. Social History:  Social History   Substance Use Topics    Smoking status: Former Smoker    Smokeless tobacco: Never Used    Alcohol use No       Allergies:  No Known Allergies      Review of Systems     Review of Systems   Unable to perform ROS: Dementia         Physical Exam     Visit Vitals    /71    Pulse 80    Temp 98.8 °F (37.1 °C)    Resp 16    Ht 5' 6\" (1.676 m)    Wt 45.4 kg (100 lb)    SpO2 100%    BMI 16.14 kg/m2       Physical Exam   Constitutional: He appears well-developed and well-nourished. He appears cachectic. No distress. Thin appearing  No acute distress   HENT:   Head: Normocephalic and atraumatic. Eyes: Conjunctivae are normal. No scleral icterus. Neck: Normal range of motion. Neck supple. No JVD present. Cardiovascular: Normal rate, regular rhythm and normal heart sounds. 4 intact extremity pulses   Pulmonary/Chest: Effort normal and breath sounds normal.   Abdominal: Soft. He exhibits no mass. There is no tenderness. Musculoskeletal: Normal range of motion. Lymphadenopathy:     He has no cervical adenopathy. Neurological: He is alert. Skin: Skin is warm and dry. Nursing note and vitals reviewed.         Diagnostic Study Results     Labs -  Recent Results (from the past 12 hour(s))   POC LACTIC ACID    Collection Time: 02/22/18 11:29 AM   Result Value Ref Range    Lactic Acid (POC) 2.6 (HH) 0.4 - 2.0 mmol/L   CBC WITH AUTOMATED DIFF    Collection Time: 02/22/18 11:43 AM   Result Value Ref Range    WBC 10.1 4.6 - 13.2 K/uL    RBC 4.86 4.70 - 5.50 M/uL    HGB 14.9 13.0 - 16.0 g/dL    HCT 45.9 36.0 - 48.0 %    MCV 94.4 74.0 - 97.0 FL    MCH 30.7 24.0 - 34.0 PG    MCHC 32.5 31.0 - 37.0 g/dL    RDW 17.2 (H) 11.6 - 14.5 %    PLATELET 905 993 - 530 K/uL    MPV 10.8 9.2 - 11.8 FL    NEUTROPHILS 71 40 - 73 %    LYMPHOCYTES 19 (L) 21 - 52 %    MONOCYTES 10 3 - 10 %    EOSINOPHILS 0 0 - 5 %    BASOPHILS 0 0 - 2 %    ABS. NEUTROPHILS 7.2 1.8 - 8.0 K/UL    ABS. LYMPHOCYTES 1.9 0.9 - 3.6 K/UL    ABS. MONOCYTES 1.0 0.05 - 1.2 K/UL    ABS. EOSINOPHILS 0.0 0.0 - 0.4 K/UL    ABS. BASOPHILS 0.0 0.0 - 0.06 K/UL    DF AUTOMATED     METABOLIC PANEL, COMPREHENSIVE    Collection Time: 02/22/18 11:43 AM   Result Value Ref Range    Sodium 153 (H) 136 - 145 mmol/L    Potassium 5.1 3.5 - 5.5 mmol/L    Chloride 110 (H) 100 - 108 mmol/L    CO2 25 21 - 32 mmol/L    Anion gap 18 3.0 - 18 mmol/L    Glucose 400 (H) 74 - 99 mg/dL    BUN 53 (H) 7.0 - 18 MG/DL    Creatinine 1.69 (H) 0.6 - 1.3 MG/DL    BUN/Creatinine ratio 31 (H) 12 - 20      GFR est AA 51 (L) >60 ml/min/1.73m2    GFR est non-AA 42 (L) >60 ml/min/1.73m2    Calcium 10.0 8.5 - 10.1 MG/DL    Bilirubin, total 0.5 0.2 - 1.0 MG/DL    ALT (SGPT) 24 16 - 61 U/L    AST (SGOT) 14 (L) 15 - 37 U/L    Alk.  phosphatase 129 (H) 45 - 117 U/L    Protein, total 6.8 6.4 - 8.2 g/dL    Albumin 2.9 (L) 3.4 - 5.0 g/dL    Globulin 3.9 2.0 - 4.0 g/dL    A-G Ratio 0.7 (L) 0.8 - 1.7     CULTURE, BLOOD    Collection Time: 02/22/18 11:43 AM   Result Value Ref Range    Special Requests: PERIPHERAL      Culture result: PENDING    PH, VENOUS    Collection Time: 02/22/18 11:43 AM   Result Value Ref Range    VENOUS PH 7.37 7.32 - 7.42 EKG, 12 LEAD, INITIAL    Collection Time: 02/22/18 11:47 AM   Result Value Ref Range    Ventricular Rate 102 BPM    Atrial Rate 102 BPM    P-R Interval 122 ms    QRS Duration 80 ms    Q-T Interval 342 ms    QTC Calculation (Bezet) 445 ms    Calculated P Axis 78 degrees    Calculated R Axis 84 degrees    Calculated T Axis 70 degrees    Diagnosis       Sinus tachycardia  Otherwise normal ECG  When compared with ECG of 14-FEB-2018 11:17,  T wave inversion no longer evident in Anterior leads         Radiologic Studies -   No orders to display     No results found. Medical Decision Making   Initial Medical Decision Making and DDx:  DDx: Hyperneutremia, renal failure, dehydration  Nurse found no urine output from straight cath. I do not suspect infection. ED Course: Progress Notes, Reevaluation, and Consults:  2:08 PM Consult: I discussed care with Dr. America Greer (Hospitalist). It was a standard discussion including patient history, chief complaint, available diagnostic results, and predicted treatment course. Will see and admit    I am the first provider for this patient. I reviewed the vital signs, available nursing notes, past medical history, past surgical history, family history and social history. Vital Signs-Reviewed the patient's vital signs. Pulse Oximetry Analysis - 100% in room air    EKG: Interpreted by the EP. Time Interpreted: 11:49   Rate: 102 bpm   Rhythm: Sinus tachycardia   Interpretation: No acute process    Records Reviewed: Nursing Notes and Old Medical Records (Time of Review: 12:11 PM)    Diagnosis     Clinical Impression:   1. Acute renal failure, unspecified acute renal failure type ([address]    2. Hypernatremia    3. Dehydration        Disposition: Admit    Follow-up Information     None           Patient's Medications   Start Taking    No medications on file   Continue Taking    ALPRAZOLAM (XANAX) 0.25 MG TABLET    Take 1 Tab by mouth every eight (8) hours as needed for Anxiety.  Max Daily Amount: 0.75 mg. CITALOPRAM (CELEXA) 40 MG TABLET    Take 1 Tab by mouth daily. Indications: ANXIETY WITH DEPRESSION    INSULIN ASPART (NOVOLOG) 100 UNIT/ML INJECTION    by SubCUTAneous route Before breakfast, lunch, dinner and at bedtime. Indications: type 1 diabetes mellitus    INSULIN GLARGINE (LANTUS) 100 UNIT/ML INJECTION    14 Units by SubCUTAneous route nightly. MIDODRINE (PROAMITINE) 10 MG TABLET    Take 10 mg by mouth three (3) times daily (with meals). Indications: Hold for SBP > 130    MODAFINIL (PROVIGIL) 200 MG TABLET    Take 200 mg by mouth daily. NORTRIPTYLINE (PAMELOR) 75 MG CAPSULE    Take 150 mg by mouth nightly. Indications: 150mg at dinner and 75mg at hs    PRAMIPEXOLE (MIRAPEX) 0.125 MG TABLET    Take 0.125 mg by mouth three (3) times daily. Indications: IDIOPATHIC PARKINSONISM   These Medications have changed    No medications on file   Stop Taking    VANCOMYCIN 50 MG/ML ORAL SOLUTION (COMPOUNDED)    Take 2.5 mL by mouth every six (6) hours. 125 mg po qid x 12 days,then 125 mg po bid x 7 days;then 125 mg po daily x 7 days;then 125 mg po every other day x 7 days;then 125 mg po q 3 days x 14 days     _______________________________    Attestations:  Scribe Attestation     [employer] acting as a scribe for and in the presence of José Miguel Zazueta MD      February 22, 2018 at 12:11 PM       Provider Attestation:      I personally performed the services described in the documentation, reviewed the documentation, as recorded by the scribe in my presence, and it accurately and completely records my words and actions.  February 22, 2018 at 12:11 PM - José Miguel Zazueta MD    _______________________________

## 2018-02-23 LAB
BASOPHILS # BLD: 0 K/UL (ref 0–0.06)
BASOPHILS NFR BLD: 0 % (ref 0–2)
DIFFERENTIAL METHOD BLD: ABNORMAL
EOSINOPHIL # BLD: 0 K/UL (ref 0–0.4)
EOSINOPHIL NFR BLD: 0 % (ref 0–5)
ERYTHROCYTE [DISTWIDTH] IN BLOOD BY AUTOMATED COUNT: 17.7 % (ref 11.6–14.5)
GLUCOSE BLD STRIP.AUTO-MCNC: 163 MG/DL (ref 70–110)
GLUCOSE BLD STRIP.AUTO-MCNC: 190 MG/DL (ref 70–110)
GLUCOSE BLD STRIP.AUTO-MCNC: 191 MG/DL (ref 70–110)
GLUCOSE BLD STRIP.AUTO-MCNC: 239 MG/DL (ref 70–110)
GLUCOSE BLD STRIP.AUTO-MCNC: 38 MG/DL (ref 70–110)
GLUCOSE BLD STRIP.AUTO-MCNC: 48 MG/DL (ref 70–110)
GLUCOSE BLD STRIP.AUTO-MCNC: 71 MG/DL (ref 70–110)
HCT VFR BLD AUTO: 36.6 % (ref 36–48)
HGB BLD-MCNC: 11.5 G/DL (ref 13–16)
LYMPHOCYTES # BLD: 2.9 K/UL (ref 0.9–3.6)
LYMPHOCYTES NFR BLD: 25 % (ref 21–52)
MCH RBC QN AUTO: 29.5 PG (ref 24–34)
MCHC RBC AUTO-ENTMCNC: 31.4 G/DL (ref 31–37)
MCV RBC AUTO: 93.8 FL (ref 74–97)
MONOCYTES # BLD: 1 K/UL (ref 0.05–1.2)
MONOCYTES NFR BLD: 9 % (ref 3–10)
NEUTS SEG # BLD: 7.6 K/UL (ref 1.8–8)
NEUTS SEG NFR BLD: 66 % (ref 40–73)
PLATELET # BLD AUTO: 235 K/UL (ref 135–420)
PMV BLD AUTO: 10.2 FL (ref 9.2–11.8)
RBC # BLD AUTO: 3.9 M/UL (ref 4.7–5.5)
WBC # BLD AUTO: 11.5 K/UL (ref 4.6–13.2)

## 2018-02-23 PROCEDURE — 82962 GLUCOSE BLOOD TEST: CPT

## 2018-02-23 PROCEDURE — 74011250637 HC RX REV CODE- 250/637: Performed by: NURSE PRACTITIONER

## 2018-02-23 PROCEDURE — 74011636637 HC RX REV CODE- 636/637: Performed by: NURSE PRACTITIONER

## 2018-02-23 PROCEDURE — 74011636637 HC RX REV CODE- 636/637: Performed by: HOSPITALIST

## 2018-02-23 PROCEDURE — 77030020263 HC SOL INJ SOD CL0.9% LFCR 1000ML

## 2018-02-23 PROCEDURE — 94760 N-INVAS EAR/PLS OXIMETRY 1: CPT

## 2018-02-23 PROCEDURE — 65270000029 HC RM PRIVATE

## 2018-02-23 PROCEDURE — 85025 COMPLETE CBC W/AUTO DIFF WBC: CPT | Performed by: NURSE PRACTITIONER

## 2018-02-23 PROCEDURE — 77010033678 HC OXYGEN DAILY

## 2018-02-23 PROCEDURE — 36415 COLL VENOUS BLD VENIPUNCTURE: CPT | Performed by: NURSE PRACTITIONER

## 2018-02-23 PROCEDURE — 77030032490 HC SLV COMPR SCD KNE COVD -B

## 2018-02-23 PROCEDURE — 74011250636 HC RX REV CODE- 250/636: Performed by: NURSE PRACTITIONER

## 2018-02-23 RX ORDER — DEXTROSE MONOHYDRATE AND SODIUM CHLORIDE 5; .9 G/100ML; G/100ML
75 INJECTION, SOLUTION INTRAVENOUS
Status: DISCONTINUED | OUTPATIENT
Start: 2018-02-23 | End: 2018-02-25

## 2018-02-23 RX ORDER — INSULIN GLARGINE 100 [IU]/ML
4 INJECTION, SOLUTION SUBCUTANEOUS
Status: DISCONTINUED | OUTPATIENT
Start: 2018-02-23 | End: 2018-02-27

## 2018-02-23 RX ORDER — INSULIN LISPRO 100 [IU]/ML
10 INJECTION, SOLUTION INTRAVENOUS; SUBCUTANEOUS ONCE
Status: DISCONTINUED | OUTPATIENT
Start: 2018-02-23 | End: 2018-02-23

## 2018-02-23 RX ADMIN — CITALOPRAM HYDROBROMIDE 40 MG: 20 TABLET ORAL at 11:33

## 2018-02-23 RX ADMIN — INSULIN GLARGINE 4 UNITS: 100 INJECTION, SOLUTION SUBCUTANEOUS at 21:30

## 2018-02-23 RX ADMIN — MODAFINIL 200 MG: 100 TABLET ORAL at 11:33

## 2018-02-23 RX ADMIN — MIDODRINE HYDROCHLORIDE 10 MG: 2.5 TABLET ORAL at 11:32

## 2018-02-23 RX ADMIN — PRAMIPEXOLE DIHYDROCHLORIDE 0.12 MG: 0.25 TABLET ORAL at 11:33

## 2018-02-23 RX ADMIN — GLUCAGON HYDROCHLORIDE 1 MG: KIT at 11:44

## 2018-02-23 RX ADMIN — INSULIN LISPRO 2 UNITS: 100 INJECTION, SOLUTION INTRAVENOUS; SUBCUTANEOUS at 21:30

## 2018-02-23 RX ADMIN — PRAMIPEXOLE DIHYDROCHLORIDE 0.12 MG: 0.25 TABLET ORAL at 16:17

## 2018-02-23 RX ADMIN — SODIUM CHLORIDE 75 ML/HR: 900 INJECTION, SOLUTION INTRAVENOUS at 21:29

## 2018-02-23 RX ADMIN — MIDODRINE HYDROCHLORIDE 10 MG: 2.5 TABLET ORAL at 16:16

## 2018-02-23 RX ADMIN — SODIUM CHLORIDE 75 ML/HR: 900 INJECTION, SOLUTION INTRAVENOUS at 06:33

## 2018-02-23 RX ADMIN — PRAMIPEXOLE DIHYDROCHLORIDE 0.12 MG: 0.25 TABLET ORAL at 21:28

## 2018-02-23 NOTE — PROGRESS NOTES
Problem: Falls - Risk of  Goal: *Absence of Falls  Document Ernie Fall Risk and appropriate interventions in the flowsheet.    Outcome: Progressing Towards Goal  Fall Risk Interventions:                 Elimination Interventions: Call light in reach, Patient to call for help with toileting needs

## 2018-02-23 NOTE — ANCILLARY DISCHARGE INSTRUCTIONS
Patient and/or next of kin has been given the [facility] Important Message From Medicare About Your Rights\" letter and all questions were answered.

## 2018-02-23 NOTE — MANAGEMENT PLAN
[facility]   Discharge Planning/ Assessment    Reasons for Intervention:   Interviewed patient. Verified demographics listed on face sheet with patient; all information correct. Pt has Medicare A&B and [place] Medicaid. Patient stated their PCP is Dr Nereyda Meade at [facility] . Patient lives a [employer] home. Patient's NOK is brotherMirian at [phone]. Patient dependent with ADLs prior to admission. States no longer gets out of hospital bed. DME prior to admission: oxygen at [facility].  Discharge plan is to return to [place] [id number] RN BSN  Outcomes Manager  Pager # [phone]    High Risk Criteria  [x] Yes  []No   Physician Referral  [] Yes  []No        Date    Nursing Referral  [] Yes  []No        Date    Patient/Family Request  [] Yes  []No        Date       Resources:    Medicare  [] Yes  []No   Medicaid  [] Yes  []No   No Resources  [] Yes  []No   Private Insurance  [] Yes  []No    Name/Phone Number    Other  [] Yes  []No        (i.e. Workman's Comp)         Prior Services:    Prior Services  [x] Yes  []No   Home Health  [] Yes  []No   [address]  [] Yes  []No        Number of [address]  [] Yes  []No       Meals on Wheels  [] Yes  []No   Office on Aging  [] Yes  []No   Transportation Services  [] Yes  []No   Nursing Home  [x] Yes  []No        Nursing Home Name [facility]  [] Yes  []No        [address] Name    Other       Information Source:      Information obtained from  [x] Patient  [] Parent   [] [address]  [] Child  [] Spouse   [] Significant Other/Partner   [] Friend      [] EMS    [] Nursing Home Chart          [] Other:   Chart Review  [x] Yes  []No     Family/Support System:    Patient lives with  [] Alone    [] Spouse   [] Significant Other  [] Children  [] Caretaker   [] Parent  [] Sibling     [x] Other       Other Support System:    Is the patient responsible for care of others  [] Yes  [x]No   Information of person caring for patient on  discharge    Managers financial affairs independently  [] Yes  [x]No   If no, explain:      Status Prior to Admission:    Mental Status  [x] Awake  [x] Alert  [x] Oriented  [] Quiet/Calm [] Lethargic/Sedated   [] Disoriented  [] Restless/Anxious  [] Combative   Personal Care  [] Dependent  [] [address]  [x] Requires Assistance   Meal Preparation Ability  [] Independent   [] Standby Assistance   [] Minimal Assistance   [] Moderate Assistance  [] Maximum Assistance     [x] Total Assistance   Chores  [] Independent with Chores   [] N/A Nursing Home Resident   [x] Requires Assistance   Bowel/Bladder  [] Continent  [] Catheter  [] Incontinent  [] Ostomy Self-Care    [] Urine Diversion Self-Care  [] Maximum Assistance     [x] Total Assistance   Number of Persons needed for assistance    DME at home  [] [employer], Vinod [employer]  [] [employer], Straight   [] Commode    [] Bathroom/Grab Bars  [x] Hospital Bed  [] Nebulizer  [x] Oxygen           [] Raised Toilet Seat  [] Shower Chair  [] Side Rails for Bed   [] Tub Transfer Bench   [] Rodriguez Rinks  [] Verita Nella, Standard      [] Other:   Vendor      Treatment Presently Receiving:    Current Treatments  [] Chemotherapy  [] Dialysis  [] Insulin  [x] IVAB [x] IVF   [x] O2  [] PCA   [] PT   [] RT   [] Tube Feedings   [] Wound Care     Psychosocial Evaluation:    Verbalized Knowledge of Disease Process  [] Patient  []Family   Coping with Disease Process  [] Patient  []Family   Requires Further Counseling Coping with Disease Process  [] Patient  []Family     Identified Projected Needs:    Home Health Aid  [] Yes  []No   Transportation  [x] Yes  []No   Education  [] Yes  []No        Specific Education     Financial Counseling  [] Yes  []No   Inability to Care for Self/Will Require 24 hour care  [] Yes  []No   Pain Management  [] Yes  []No   Home Infusion Therapy  [] Yes  []No   Oxygen Therapy  [] Yes  []No DME  [] Yes  []No   Long Term Care Placement  [] Yes  []No   Rehab  [] Yes  []No   Physical Therapy  [] Yes  []No   Needs Anticipated At This Time  [x] Yes  []No     Intra-Hospital Referral:    [facility]  [] Yes  []No     [] Yes  []No   Patient Representative  [] Yes  []No   Staff for Teaching Needs  [] Yes  []No   Specialty Teaching Needs     Diabetic Educator  [] Yes  []No   Referral for Diabetic Educator Needed  [] Yes  []No  If Yes, place order for Nutritionist or Diabetic Consult     Tentative Discharge Plan:    Home with No Services  [] Yes  [x]No   Home with [address]  [] Yes  []No        If Yes, specify type    [address]  [] Yes  []No        If Yes, specify type    Meals on Wheels  [] Yes  []No   Office of Aging  [] Yes  []No   NHP  [] Yes  []No   Return to the Nursing Home  [x] Yes  []No   Rehab Therapy  [] Yes  []No   Acute Rehab  [] Yes  []No   Subacute Rehab  [] Yes  []No   Private Care  [] Yes  []No   Substance Abuse Referral  [] Yes  []No   Transportation  [] Yes  []No   Chore Service  [] Yes  []No   Inpatient Hospice  [] Yes  []No   OP RT  [] Yes  [] No   OP Hemo  [] Yes  [] No   OP PT  [] Yes  []No   Support Group  [] Yes  []No   Reach to Recovery  [] Yes  []No   OP Oncology Clinic  [] Yes  []No   Clinic Appointment  [] Yes  []No   DME  [] Yes  []No   Comments    Name of D/C Planner or  Given to Patient or Family Theresa Marquez RN BSN  Outcomes Manager  Pager # [phone]   Phone Number         Extension    Date 2/23/2018   Time 0930   If you are discharged home, whom do you designate to participate in your discharge plan and receive any information needed?      Enter name of designee brother        Phone # of designee         Address of designee         Updated         Patient refused to designate any           individual

## 2018-02-23 NOTE — PROGRESS NOTES
0730 Report received from [facility]. Patient received in bed awake and alert x 4 and is able to make all his needs known at this time. Patient is incontinent of bowel and bladder and is noted with a Andujar cath draining ashleigh colored urine. Patient is currently on bed rest, and is unable to ambulate. Patient noted on 3 Liters of oxygen noted with clear lung sounds. Patient is noted with sacral stage I pressure area, and bilateral LE bruising. Plans are to discharge back to [facility] when medically stable. Patient currently c/o zero pain and is noted with zero s/s of distress. Patient came in via EMS, for abnormal lab values and high blood sugar. Patient has a history of abnormal blood sugars dated back to age 13, Patient has a history of fall with traumatic brain injury and has a history of mental illness. He is alert x 4, with a delayed response, he has a history of anxiety with swallowing. Patient educated on medication needs. All medication given crushed in applesauce. Bedside and Verbal shift change report given to [facility] RN. (oncoming nurse) by Olvin Mtz RN (offgoing nurse). Report included the following information SBAR, ED Summary, Procedure Summary, Intake/Output, MAR, Recent Results, Med Rec Status and Cardiac Rhythm Regular.

## 2018-02-23 NOTE — ANCILLARY DISCHARGE INSTRUCTIONS
[facility] Readmission Patient Kale Ahn    The following concerns the patient's last admission and the events following discharge from the hospital:      Date of last hospital discharge: 2/17/18      Date of Readmission:  2/22/18    Reason for Readmission:  Acute Renal Failure      Gen Maya Patient    Were you kept informed about your diagnoses during your stay in the hospital and what was being done to further evaluate and treat them? [] None of time   [] Some of time   [] Most of time   [] All of time   At the time of your discharge, did someone talk to you about:  1. What your diagnoses were? 2. What tests or procedures needed to be done after you left? 3. What to watch out for regarding worsening of your disease? 4. What to do if you were experiencing worsening of your disease? 5. Who to contact (and how) if you were experiencing worsening of your disease? [] Yes  [] No  [] Not Sure   [] Yes  [] No  [] Not Sure   [] Yes  [] No  [] Not Sure   [] Yes  [] No  [] Not Sure   [] Yes  [] No  [] Not Sure   Were you asked about your understanding of these instructions? [] Yes  [] No  [] Not Sure   Were the discharge instructions written down and given to you before you left? [] Yes  [] No  [] Not Sure   Were the written discharge instructions and plans easy to read and understand? [] Yes  [] No  [] Not Sure   How confident were you about understanding these instructions? [] Very confident   [] Somewhat confident   [] Not confident   [] Not Sure   Do you have a regular doctor who takes care of you for most things? [] Yes  [] No  [] Not Sure   At the time of your discharge, did someone talk to you about which medications to take when you left and which ones to discontinue? [] Yes  [] No  [] Not Sure   Did you take your medications as they were prescribed? [] Yes  [] No  [] Not Sure   If not, what were the difficulties you experienced with taking your medications? Comment:          After you left the hospital, did you have an appointment with your doctor? [] Yes  [] No  [] Not Sure       If yes, who made the appointment? [] I did    [] My family   [] Hospital staff   [] Not Sure   Were you able to get to this appointment?    [] Yes  [] No  [] Not Sure   How do you think you became sick enough to be readmitted to the hospital?   Comment:          Source:  Modified from [facility], [place], [place]

## 2018-02-23 NOTE — DIABETES MGMT
NUTRITIONAL ASSESSMENT GLYCEMIC CONTROL/ PLAN OF CARE     Lorenzo Noriega           56 y.o.           2/22/2018                 1. Acute renal failure, unspecified acute renal failure type ([address]    2. Hypernatremia    3. Dehydration       INTERVENTIONS/PLAN:   1. Monitor glycemic control, labs and weights. 2.  If tube feeding needed, suggest Osmolite 1.2 francois, initial rate of 10 ml/hr increasing by 10 ml/hr every 6 hours to goal rate of 65 ml/hr to provide 1872 calories, 87 gram protein, ~ 1.3 L free water/d (from tube feeding). 3.  If po diet is ordered, suggest sending Ensure Enlive (likes chocolate) TID. ASSESSMENT:   Nutritional Status:   Pt is 70% ideal wt with thin appearance, history of poor appetite with unintentional weight loss and dysphagia. Cachectic appearance. Pt with hypoalbuminemia as evidenced by albumin of 2.6. Nutrition Diagnoses:  Underweight due to inadequate energy intake as evidenced by BMI of 16.1 kg/m2. Inadequate oral food and beverage intake due to dysphagia in setting of Parkinson's disease as evidenced by history of poor po intake at prior admissions. Unintentional weight loss due to decreased appetite as evidenced by documented weight loss of 16 lbs (14%) in past 1.5 months. Pt also with reported 35 lb weight loss (33%)  in past 6 months and 65 lb weight loss (36%) in past 1.5 years. Difficulty swallowing due to dysphagia in the setting of Parkinson's disease as evidenced history of texture altered diet orders. Altered GI function due to C. Diff as evidenced by recent treatment w/ oral Vancomycin . Altered nutrition related labs due to T1DM as evidenced by BG of 7.6% and widley varied BG readings (38 to 379 mg/dL).      Meets Criteria for Chronic Malnutrition   [x] Severe Malnutrition, as evidenced by:   [x] Severe muscle wasting, loss of subcutaneous fat   [x] Nutritional intake of <75% of recommended intake for >1 month   [x] Weight loss of  >5% in 1 month, >7.5% in 3 months, >10% in 6 months, >20% in 1 year   [] Severe edema        Diabetes Management:   Pt with T1DM and he reports his BG frequently goes [place] and low\" for no reason. He relates he is usually asymptomatic when his BG is low. Pt with relatively well controlled diabetes PTA  per A1C but this test may be misleading in event pt with brittle T1DM. Noted hypoglycemia this AM. Pt with unprovoked episodes of hypoglycemia at prior admission. Suggest continuing basal insulin in pt with T1DM yet at much lower dose in pt at risk of asymptomatic hypoglycemia. Recent blood glucose: Within target range (non-ICU: <140; ICU<180): [] Yes   [x]  No    Current Insulin regimen:   Lantus 4 units/day  Corrective lispro normal insulin sensitivity ACHS   Home medication/insulin regimen:   Lantus 14 units every night  Novolog TID at meals, no dose stated and pt is unable to state dose  HbA1c: 7.5 %  ( 2/14/18)- ave BG has been ~ 165 mg/dL over past 3 months. Adequate glycemic control PTA:  [x] Yes  [] No       SUBJECTIVE/OBJECTIVE:   Information obtained from: chart review, pt, pt known from previous admission 2/6/18 and 2/14/18. On 2/6/18 (prior admission), pt reported that 1.5 years ago his usual weight was 180# and 6 months ago his weight was 150 #. He complained of having reflux, making his food taste bad. He also complained of his mouth being dry. Pt denies having food allergies and does not have problems with chewing. On 2/14/18 pt reported he had been unable to take any clear liquids and was asking when his tube feeding will be started. Pt used to live with his brother but was discharged earlier in month to [facility]. Pt was receiving pureed diet with thin liquids at prior admission (2/6/18). On 2/23/18 (today) pt reports he was receiving regular food at [facility] but was unable to eat. He states he can drink fine and likes Glucerna.   Pt admitted with abnormal labs, hypernatremia, ARF, , poor po intake; PMHx includes T1DM, traumatic brain injury, Parkinson's disease, psychiatric disorder, recent C diff colitis. Diet: NPO    No data found. Medications: [x]                Reviewed   IVF:  NS at 75 ml/hr    Most Recent POC Glucose:   Recent Labs      02/22/18   1935  02/22/18   1143   GLU  105*  400*         Labs:   Lab Results   Component Value Date/Time    Hemoglobin A1c 7.5 (H) 02/14/2018 02:40 PM     Lab Results   Component Value Date/Time    Sodium 140 02/22/2018 07:35 PM    Potassium 4.4 02/22/2018 07:35 PM    Chloride 104 02/22/2018 07:35 PM    CO2 29 02/22/2018 07:35 PM    Anion gap 7 02/22/2018 07:35 PM    Glucose 105 (H) 02/22/2018 07:35 PM    BUN 17 02/22/2018 07:35 PM    Creatinine 1.12 02/22/2018 07:35 PM    Calcium 8.2 (L) 02/22/2018 07:35 PM    Magnesium 1.9 02/06/2018 09:05 AM    Albumin 2.6 (L) 02/22/2018 07:35 PM       Anthropometrics: IBW : 64.4 kg (142 lb), % IBW (Calculated): 70.42 %, BMI (calculated): 16.1  Ideal wt - 142 lbs/64.6 kg  Wt Readings from Last 1 Encounters:   02/23/18 45.4 kg (100 lb)      Ht Readings from Last 1 Encounters:   02/23/18 5' 6\" (1.676 m)     Last Weight Metrics:  Weight Loss Metrics 2/23/2018 2/16/2018 2/8/2018 1/12/2018   Today's Wt 100 lb 119 lb 14.9 oz 124 lb 116 lb   BMI 16.14 kg/m2 19.36 kg/m2 20.01 kg/m2 18.72 kg/m2       Estimated Nutrition Needs:  1851 Kcals/day, Protein (g): 68 g Fluid (ml): 1900 ml 1956 Kcals/day, Protein (g): 79 g Fluid (ml): 2000 ml (Alfredo Linares)  Based on:   [x]          Actual BW    []          ABW   []            Adjusted BW           Nutrition Interventions:  TF recommendations if needed  Goal:   Blood glucose will be within target range of  mg/dL by 2/26/18. Provision of adequate nutrition by 2/28/18. Pt will maintain weight (+/- 1-2 kg) or gain weight (1-2#) by 3/5/18.         Nutrition Monitoring and Evaluation      []     Monitor po intake on meal rounds  [x]     Continue inpatient monitoring and intervention  []     Other:      Nutrition Risk:  [x]   High     []  Moderate    []  Minimal/Uncompromised    Bean Villasenor, [address] [place] [address], [facility]   Office:  [address] [place] [address] Pager:  [phone]

## 2018-02-23 NOTE — ACP (ADVANCE CARE PLANNING)
Patient has designated ___brother_____________________ to participate in his/her discharge plan and to receive any needed information.      Name: Leonor martinez pt  Phone number: [phone]

## 2018-02-23 NOTE — ROUTINE PROCESS
Assumed care of pt. Pt lying in bed. Andujar leaking some, adjusted balloon with Ortiz Lee RN. Andujar secure and intact. Gown changed. Pt on 3L O2 NC. AxOx4. Stage 1 noticed on pt's sacrum area. No c/o pain. No signs of distress. Review med list with pt. Pt states he's not taking any of \". .those meds, I can't fit them in my mouth. \" Shift assessment complete. Call bell in reach. Will continue to monitor. 2214- Pt refused PO meds, say \"I can't fit swallow them. \" pt's BS- 379. 10 units of insulin given and Dr. Almetta Scheuermann paged and made aware- Instructed to recheck BS in 2 hours and to call  with results. 0032- BS- 367. Dr. Almetta Scheuermann made aware- instructed to check BS again in 2 hours and call MD with results. 6509- Reassessment complete. Pt resting in bed.     7170- BS- 163. Dr. Almetta Scheuermann paged and notified. 5765- new bag of normal saline hung. Bedside shift change report given to Carmela Yanez rn (oncoming nurse) by Damon Salcedo (offgoing nurse). Report included the following information SBAR, Kardex, Intake/Output, MAR, Accordion and Recent Results.

## 2018-02-23 NOTE — PROGRESS NOTES
Bedside and Verbal shift change report given to Nghia (oncoming nurse) by Jacqueline Mejias (offgoing nurse). Report included the following information SBAR, Kardex and MAR.

## 2018-02-24 LAB
GLUCOSE BLD STRIP.AUTO-MCNC: 218 MG/DL (ref 70–110)
GLUCOSE BLD STRIP.AUTO-MCNC: 231 MG/DL (ref 70–110)
GLUCOSE BLD STRIP.AUTO-MCNC: 305 MG/DL (ref 70–110)
GLUCOSE BLD STRIP.AUTO-MCNC: 96 MG/DL (ref 70–110)

## 2018-02-24 PROCEDURE — 77010033678 HC OXYGEN DAILY

## 2018-02-24 PROCEDURE — 36415 COLL VENOUS BLD VENIPUNCTURE: CPT | Performed by: HOSPITALIST

## 2018-02-24 PROCEDURE — 74011250636 HC RX REV CODE- 250/636: Performed by: NURSE PRACTITIONER

## 2018-02-24 PROCEDURE — 74011000258 HC RX REV CODE- 258: Performed by: HOSPITALIST

## 2018-02-24 PROCEDURE — 74011636637 HC RX REV CODE- 636/637: Performed by: NURSE PRACTITIONER

## 2018-02-24 PROCEDURE — 77030020263 HC SOL INJ SOD CL0.9% LFCR 1000ML

## 2018-02-24 PROCEDURE — 87040 BLOOD CULTURE FOR BACTERIA: CPT | Performed by: HOSPITALIST

## 2018-02-24 PROCEDURE — 74011636637 HC RX REV CODE- 636/637: Performed by: HOSPITALIST

## 2018-02-24 PROCEDURE — 82962 GLUCOSE BLOOD TEST: CPT

## 2018-02-24 PROCEDURE — 65270000029 HC RM PRIVATE

## 2018-02-24 PROCEDURE — 74011250637 HC RX REV CODE- 250/637: Performed by: NURSE PRACTITIONER

## 2018-02-24 RX ADMIN — INSULIN LISPRO 4 UNITS: 100 INJECTION, SOLUTION INTRAVENOUS; SUBCUTANEOUS at 23:00

## 2018-02-24 RX ADMIN — PRAMIPEXOLE DIHYDROCHLORIDE 0.12 MG: 0.25 TABLET ORAL at 22:57

## 2018-02-24 RX ADMIN — DEXTROSE AND SODIUM CHLORIDE 75 ML/HR: 5; 900 INJECTION, SOLUTION INTRAVENOUS at 08:45

## 2018-02-24 RX ADMIN — PRAMIPEXOLE DIHYDROCHLORIDE 0.12 MG: 0.25 TABLET ORAL at 18:46

## 2018-02-24 RX ADMIN — INSULIN GLARGINE 4 UNITS: 100 INJECTION, SOLUTION SUBCUTANEOUS at 23:00

## 2018-02-24 RX ADMIN — CITALOPRAM HYDROBROMIDE 40 MG: 20 TABLET ORAL at 08:41

## 2018-02-24 RX ADMIN — INSULIN LISPRO 4 UNITS: 100 INJECTION, SOLUTION INTRAVENOUS; SUBCUTANEOUS at 18:47

## 2018-02-24 RX ADMIN — MODAFINIL 200 MG: 100 TABLET ORAL at 08:40

## 2018-02-24 RX ADMIN — MIDODRINE HYDROCHLORIDE 10 MG: 2.5 TABLET ORAL at 11:17

## 2018-02-24 RX ADMIN — MIDODRINE HYDROCHLORIDE 10 MG: 2.5 TABLET ORAL at 08:41

## 2018-02-24 RX ADMIN — MIDODRINE HYDROCHLORIDE 10 MG: 2.5 TABLET ORAL at 18:46

## 2018-02-24 RX ADMIN — PRAMIPEXOLE DIHYDROCHLORIDE 0.12 MG: 0.25 TABLET ORAL at 08:40

## 2018-02-24 RX ADMIN — SODIUM CHLORIDE 75 ML/HR: 900 INJECTION, SOLUTION INTRAVENOUS at 20:20

## 2018-02-24 NOTE — PROGRESS NOTES
Assumed care; Pt resting in bed; no distress noted; Pt denies pain; bed in low position; Side rails up x 3; Call light and personal belonging within reach. Will continue to monitor. 2000: C-diff isolation resolved. Clarified with Cisco Patel. C-diff isolation discontinued. 2128: Pt reject Pamelor 150 mg in applesauce. Request pudding- No pudding available. Pt request medication in ginger ale. 2154: Medication in ginger ale offered. Pt reject. Medication disposed.  Will continue to monitor

## 2018-02-24 NOTE — PROGRESS NOTES
0830 Report received from [facility]. Patient received in bed awake and alert x 4 and is able to make all his needs known at this time. Patient is continent of bowel and bladder and use the bedpan, he is noted with a Andujar cath draining ashleigh colored urine. Patient is currently on bed rest, and is unable to ambulate. Patient noted on 3 Liters of oxygen noted with clear lung sounds. Patient is noted with sacral stage I pressure area, and bilateral LE bruising. Plans are to discharge back to [facility] when medically stable. Patient currently c/o zero pain and is noted with zero s/s of distress.      Patient came in via EMS, for abnormal lab values and high blood sugar. Patient has a history of abnormal blood sugars dated back to age 13, Patient has a history of fall with traumatic brain injury and has a history of mental illness. He is alert x 4, with a delayed response, he has a history of anxiety with swallowing. Patient educated on medication needs. All medication given crushed in applesauce. Bedside and Verbal shift change report given to Olamide SHAW (oncoming nurse) by José Miguel Kumar RN (offgoing nurse). Report included the following information SBAR, ED Summary, Procedure Summary, Intake/Output, MAR, Recent Results and Med Rec Status.

## 2018-02-24 NOTE — PROGRESS NOTES
Gave patient medication and he was very agitated. He was refusing his medications stating we are trying to kill him with all these pills. He states he has trouble swallowing and we do not care. I crushed medication and placed in pudding. Patient stated he did not want to be bother again until his breakfast came. Sugar was 96 so insulin was held and Normal Saline was stopped for D5.

## 2018-02-25 LAB
ALBUMIN SERPL-MCNC: 2.2 G/DL (ref 3.4–5)
ALBUMIN/GLOB SERPL: 0.8 {RATIO} (ref 0.8–1.7)
ALP SERPL-CCNC: 97 U/L (ref 45–117)
ALT SERPL-CCNC: 23 U/L (ref 16–61)
ANION GAP SERPL CALC-SCNC: 12 MMOL/L (ref 3–18)
AST SERPL-CCNC: 28 U/L (ref 15–37)
BASOPHILS # BLD: 0 K/UL (ref 0–0.06)
BASOPHILS NFR BLD: 0 % (ref 0–2)
BILIRUB SERPL-MCNC: 0.4 MG/DL (ref 0.2–1)
BUN SERPL-MCNC: 15 MG/DL (ref 7–18)
BUN/CREAT SERPL: 23 (ref 12–20)
CALCIUM SERPL-MCNC: 8.3 MG/DL (ref 8.5–10.1)
CHLORIDE SERPL-SCNC: 116 MMOL/L (ref 100–108)
CO2 SERPL-SCNC: 28 MMOL/L (ref 21–32)
CREAT SERPL-MCNC: 0.65 MG/DL (ref 0.6–1.3)
DIFFERENTIAL METHOD BLD: ABNORMAL
EOSINOPHIL # BLD: 0 K/UL (ref 0–0.4)
EOSINOPHIL NFR BLD: 0 % (ref 0–5)
ERYTHROCYTE [DISTWIDTH] IN BLOOD BY AUTOMATED COUNT: 17.1 % (ref 11.6–14.5)
GLOBULIN SER CALC-MCNC: 2.9 G/DL (ref 2–4)
GLUCOSE BLD STRIP.AUTO-MCNC: 191 MG/DL (ref 70–110)
GLUCOSE BLD STRIP.AUTO-MCNC: 198 MG/DL (ref 70–110)
GLUCOSE BLD STRIP.AUTO-MCNC: 304 MG/DL (ref 70–110)
GLUCOSE BLD STRIP.AUTO-MCNC: 36 MG/DL (ref 70–110)
GLUCOSE BLD STRIP.AUTO-MCNC: 93 MG/DL (ref 70–110)
GLUCOSE BLD STRIP.AUTO-MCNC: <30 MG/DL (ref 70–110)
GLUCOSE SERPL-MCNC: 31 MG/DL (ref 74–99)
HCT VFR BLD AUTO: 37.5 % (ref 36–48)
HGB BLD-MCNC: 11.7 G/DL (ref 13–16)
LYMPHOCYTES # BLD: 2.8 K/UL (ref 0.9–3.6)
LYMPHOCYTES NFR BLD: 28 % (ref 21–52)
MAGNESIUM SERPL-MCNC: 1.7 MG/DL (ref 1.6–2.6)
MCH RBC QN AUTO: 29.7 PG (ref 24–34)
MCHC RBC AUTO-ENTMCNC: 31.2 G/DL (ref 31–37)
MCV RBC AUTO: 95.2 FL (ref 74–97)
MONOCYTES # BLD: 0.7 K/UL (ref 0.05–1.2)
MONOCYTES NFR BLD: 7 % (ref 3–10)
NEUTS SEG # BLD: 6.2 K/UL (ref 1.8–8)
NEUTS SEG NFR BLD: 65 % (ref 40–73)
PLATELET # BLD AUTO: 191 K/UL (ref 135–420)
PMV BLD AUTO: 10.5 FL (ref 9.2–11.8)
POTASSIUM SERPL-SCNC: 2.9 MMOL/L (ref 3.5–5.5)
PROT SERPL-MCNC: 5.1 G/DL (ref 6.4–8.2)
RBC # BLD AUTO: 3.94 M/UL (ref 4.7–5.5)
SODIUM SERPL-SCNC: 156 MMOL/L (ref 136–145)
WBC # BLD AUTO: 9.7 K/UL (ref 4.6–13.2)

## 2018-02-25 PROCEDURE — 85025 COMPLETE CBC W/AUTO DIFF WBC: CPT | Performed by: HOSPITALIST

## 2018-02-25 PROCEDURE — 74011250636 HC RX REV CODE- 250/636: Performed by: HOSPITALIST

## 2018-02-25 PROCEDURE — 74011636637 HC RX REV CODE- 636/637: Performed by: HOSPITALIST

## 2018-02-25 PROCEDURE — 36415 COLL VENOUS BLD VENIPUNCTURE: CPT | Performed by: HOSPITALIST

## 2018-02-25 PROCEDURE — 74011250637 HC RX REV CODE- 250/637: Performed by: NURSE PRACTITIONER

## 2018-02-25 PROCEDURE — 82962 GLUCOSE BLOOD TEST: CPT

## 2018-02-25 PROCEDURE — 83735 ASSAY OF MAGNESIUM: CPT | Performed by: HOSPITALIST

## 2018-02-25 PROCEDURE — 77010033678 HC OXYGEN DAILY

## 2018-02-25 PROCEDURE — 80053 COMPREHEN METABOLIC PANEL: CPT | Performed by: HOSPITALIST

## 2018-02-25 PROCEDURE — 74011636637 HC RX REV CODE- 636/637: Performed by: NURSE PRACTITIONER

## 2018-02-25 PROCEDURE — 65270000029 HC RM PRIVATE

## 2018-02-25 PROCEDURE — 74011250637 HC RX REV CODE- 250/637: Performed by: HOSPITALIST

## 2018-02-25 RX ORDER — DEXTROSE AND POTASSIUM CHLORIDE 5; .15 G/100ML; G/100ML
SOLUTION INTRAVENOUS CONTINUOUS
Status: DISCONTINUED | OUTPATIENT
Start: 2018-02-25 | End: 2018-02-26

## 2018-02-25 RX ORDER — LANOLIN ALCOHOL/MO/W.PET/CERES
400 CREAM (GRAM) TOPICAL 2 TIMES DAILY
Status: DISCONTINUED | OUTPATIENT
Start: 2018-02-25 | End: 2018-03-03 | Stop reason: HOSPADM

## 2018-02-25 RX ORDER — MAGNESIUM SULFATE HEPTAHYDRATE 40 MG/ML
2 INJECTION, SOLUTION INTRAVENOUS ONCE
Status: COMPLETED | OUTPATIENT
Start: 2018-02-25 | End: 2018-02-28

## 2018-02-25 RX ADMIN — INSULIN LISPRO 1 UNITS: 100 INJECTION, SOLUTION INTRAVENOUS; SUBCUTANEOUS at 21:40

## 2018-02-25 RX ADMIN — Medication 400 MG: at 14:53

## 2018-02-25 RX ADMIN — DEXTROSE MONOHYDRATE AND POTASSIUM CHLORIDE: 5; .149 INJECTION, SOLUTION INTRAVENOUS at 09:27

## 2018-02-25 RX ADMIN — CITALOPRAM HYDROBROMIDE 40 MG: 20 TABLET ORAL at 08:00

## 2018-02-25 RX ADMIN — INSULIN GLARGINE 4 UNITS: 100 INJECTION, SOLUTION SUBCUTANEOUS at 21:39

## 2018-02-25 RX ADMIN — Medication 400 MG: at 18:35

## 2018-02-25 RX ADMIN — PRAMIPEXOLE DIHYDROCHLORIDE 0.12 MG: 0.25 TABLET ORAL at 08:01

## 2018-02-25 RX ADMIN — MIDODRINE HYDROCHLORIDE 10 MG: 2.5 TABLET ORAL at 14:53

## 2018-02-25 RX ADMIN — Medication 16 G: at 07:47

## 2018-02-25 RX ADMIN — MIDODRINE HYDROCHLORIDE 10 MG: 2.5 TABLET ORAL at 18:33

## 2018-02-25 RX ADMIN — MAGNESIUM SULFATE HEPTAHYDRATE 2 G: 40 INJECTION, SOLUTION INTRAVENOUS at 14:49

## 2018-02-25 RX ADMIN — NORTRIPTYLINE HYDROCHLORIDE 150 MG: 25 CAPSULE ORAL at 21:40

## 2018-02-25 RX ADMIN — INSULIN LISPRO 4 UNITS: 100 INJECTION, SOLUTION INTRAVENOUS; SUBCUTANEOUS at 18:42

## 2018-02-25 RX ADMIN — MODAFINIL 200 MG: 100 TABLET ORAL at 08:00

## 2018-02-25 RX ADMIN — PRAMIPEXOLE DIHYDROCHLORIDE 0.12 MG: 0.25 TABLET ORAL at 21:40

## 2018-02-25 RX ADMIN — PRAMIPEXOLE DIHYDROCHLORIDE 0.12 MG: 0.25 TABLET ORAL at 18:33

## 2018-02-25 RX ADMIN — MIDODRINE HYDROCHLORIDE 10 MG: 2.5 TABLET ORAL at 08:00

## 2018-02-25 NOTE — ROUTINE PROCESS
2000  Bedside and Verbal shift change report given to Kapil Rodas (oncoming nurse) by Epifanio Fernandez (offgoing nurse). Report included the following information SBAR, Kardex, Intake/Output and MAR. Pt awake and alert at this time. 2230  Shift assessment complete. Pt refusing pamelor, but agreeable to insulin and mirapex at this time    0300  Reassessment complete    0730  Bedside and Verbal shift change report given to Epifanio Fernandez (oncoming nurse) by Kapil Rodas (offgoing nurse). Report included the following information SBAR, Kardex, Intake/Output and MAR.

## 2018-02-25 NOTE — CONSULTS
Consult Note  Consult requested by: Dr. Tyra Gandhi is a 56 y.o. male [facility] who is being seen on consult for hypernatremia  Chief Complaint   Patient presents with    Abnormal Lab Results     Admission diagnosis: Acute renal failure (ARF) (HCC)     HPI: 56 y. o. male with history of DM and Dementia, C-Diff, Parkinson's, TBI was admitted with hypernatremia, seems like a recurrent problem. We were consulted to help diagnose etiology of hypernatremia. Patient provides limited history, says he is thirsty when asked, seems to have poor access to food and fluids in the rehab he is in. Past Medical History:   Diagnosis Date    Anxiety     Diabetes ([facility] [address]     Ill-defined condition     hypotension    Psychiatric disorder     depression   As above in HPI. History reviewed. No pertinent surgical history. Social History     Social History    Marital status: SINGLE     Spouse name: N/A    Number of children: N/A    Years of education: N/A     Occupational History    Not on file. Social History Main Topics    Smoking status: Former Smoker    Smokeless tobacco: Never Used    Alcohol use No    Drug use: No    Sexual activity: Not on file     Other Topics Concern    Not on file     Social History Narrative       History reviewed. No pertinent family history.   No Known Allergies     Home Medications:     Current Facility-Administered Medications   Medication Dose Route Frequency    dextrose 5% with KCl 20 mEq/L infusion   IntraVENous CONTINUOUS    magnesium sulfate 2 g/50 ml IVPB (premix or compounded)  2 g IntraVENous ONCE    magnesium oxide (MAG-OX) tablet 400 mg  400 mg Oral BID    influenza vaccine 2017-18 (3 yrs+)(PF) (FLUZONE QUAD/FLUARIX QUAD) injection 0.5 mL  0.5 mL IntraMUSCular PRIOR TO DISCHARGE    insulin glargine (LANTUS) injection 4 Units  4 Units SubCUTAneous QHS    citalopram (CELEXA) tablet 40 mg  40 mg Oral DAILY    midodrine (PROAMITINE) tablet 10 mg  10 mg Oral TID WITH MEALS    modafinil (PROVIGIL) tablet 200 mg  200 mg Oral DAILY    nortriptyline (PAMELOR) capsule 150 mg  150 mg Oral QHS    pramipexole (MIRAPEX) tablet 0.125 mg  0.125 mg Oral TID    acetaminophen (TYLENOL) tablet 650 mg  650 mg Oral Q4H PRN    ondansetron (ZOFRAN) injection 4 mg  4 mg IntraVENous Q4H PRN    insulin lispro (HUMALOG) injection   SubCUTAneous AC&HS    glucose chewable tablet 16 g  4 Tab Oral PRN    glucagon (GLUCAGEN) injection 1 mg  1 mg IntraMUSCular PRN    dextrose (D50W) injection syrg 12.5-25 g  25-50 mL IntraVENous PRN       Review of Systems:     Poor historian and unable to obtain 12 point ROS. Rest of the review of systems as per HPI. Physical Assessment:     Visit Vitals    /58 (BP 1 Location: Right arm, BP Patient Position: At rest)    Pulse (!) 59    Temp 98.2 °F (36.8 °C)    Resp 18    Ht 5' 6\" (1.676 m)    Wt 46.8 kg (103 lb 1.6 oz)    SpO2 100%    BMI 16.64 kg/m2       Intake/Output Summary (Last 24 hours) at 02/25/18 1245  Last data filed at 02/25/18 0659   Gross per 24 hour   Intake             2820 ml   Output              450 ml   Net             2370 ml       General: Awake alert, cuddled. In no acute distress. HEENT: Normocephalic atraumatic and oral mucosae are dry  Neck supple with no jugular venous distention and no thyromegaly   Lungs: Clear to auscultation, with no wheezing no crepitation or rhonchi   Heart: S1, S2 normal intensity, no murmur, no rub, no gallop   Abdomen: soft, non-tender; bowel sounds normal; no masses, no organomegaly   Extremities: extremities normal, warm and well-perfused. No LE edema. Skin: Skin turgor reduced.    Neurologic: awake, moves extremities when asked to limited coversation  Psych: unable to assess    Data Review:    Labs: Results:       Chemistry Recent Labs      02/25/18   0515  02/22/18   1935   GLU  31*  105*   NA  156*  140   K  2.9*  4.4   CL  116*  104   CO2  28  29   BUN  15 17   CREA  0.65  1.12   CA  8.3*  8.2*   AGAP  12  7   BUCR  23*  15   AP  97  64   TP  5.1*  6.0*   ALB  2.2*  2.6*   GLOB  2.9  3.4   AGRAT  0.8  0.8   MG  1.7   --       CBC w/Diff Recent Labs      02/25/18   0515  02/23/18   0529   WBC  9.7  11.5   RBC  3.94*  3.90*   HGB  11.7*  11.5*   HCT  37.5  36.6   PLT  191  235   GRANS  65  66   LYMPH  28  25   EOS  0  0      Coagulation No results for input(s): PTP, INR, APTT in the last 72 hours. No lab exists for component: INREXT    Iron/Ferritin No results for input(s): IRON in the last 72 hours. No lab exists for component: TIBCCALC   BNP No results for input(s): BNPP in the last 72 hours. Cardiac Enzymes No results for input(s): CPK, CKND1, ROSCOE in the last 72 hours. No lab exists for component: CKRMB, TROIP   Liver Enzymes Recent Labs      02/25/18   0515   TP  5.1*   ALB  2.2*   AP  97   SGOT  28      Thyroid Studies Lab Results   Component Value Date/Time    TSH 1.85 09/15/2011 05:20 AM           Lab Results   Component Value Date/Time    Color YELLOW 02/14/2018 11:46 AM    Appearance CLEAR 02/14/2018 11:46 AM    Specific gravity >1.030 (H) 02/14/2018 11:46 AM    pH (UA) 6.5 02/14/2018 11:46 AM    Protein 100 (A) 02/14/2018 11:46 AM    Glucose 500 (A) 02/14/2018 11:46 AM    Ketone >80 (A) 02/14/2018 11:46 AM    Bilirubin LARGE (A) 02/14/2018 11:46 AM    Urobilinogen 0.2 02/14/2018 11:46 AM    Nitrites NEGATIVE  02/14/2018 11:46 AM    Leukocyte Esterase NEGATIVE  02/14/2018 11:46 AM    Epithelial cells FEW 02/14/2018 11:46 AM    Bacteria FEW (A) 02/14/2018 11:46 AM    WBC 0 to 3 02/14/2018 11:46 AM    RBC 0 to 3 02/14/2018 11:46 AM       IMAGING:   No orders to display       Impression and Plan:     Impression:   1. Hypernatremia, high urine specific gravity on recent UA and lack of polyuria goes against diagnosis of diabetes insipidus, suggests volume depletion as etiology  2. Hypokalemia, nutritional  3.  Hypomagnesmia, nutritional Recommendations:   - Agree with IVF, change D5W with 20 meq/L KCL at 75 cc/h  - Supplement K, mag  - Check urine electrolytes, urine osmolality.   - I's and O's charting   - Monitor Chem-7, magnesium  - will need to figure out a way to keep him hydrated at the nursing home  - Discussed with primary physician   Will follow.       James Hutson MD  Pager: [phone]  February 25, 2018

## 2018-02-25 NOTE — PROGRESS NOTES
Progress Note      Patient: Steffi Alvarado               Sex: male          DOA: 2/22/2018       YOB: 1960      Age:  56 y.o.        LOS:  LOS: 2 days               Subjective:   Difficult problem theophylline pt has a psychiatric diagnosis and he is on medications which may be making control of his electrolytes very difficult .   today he was feeling well and was more alert         Objective:      Visit Vitals    /67 (BP 1 Location: Right arm, BP Patient Position: Head of bed elevated (Comment degrees))    Pulse 62    Temp 98.1 °F (36.7 °C)    Resp 18    Ht 5' 6\" (1.676 m)    Wt 46.8 kg (103 lb 1.6 oz)    SpO2 93%    BMI 16.64 kg/m2       Physical Exam:  Pt is awake and is responsive   Heart reg rate and rhythm   Lungs good breath sounds heard   Abdomen soft and nontender  Neuro nature of his mental illness is not clear . Lab/Data Reviewed:  BMP: No results found for: NA, K, CL, CO2, AGAP, GLU, BUN, CREA, GFRAA, GFRNA  CMP: No results found for: NA, K, CL, CO2, AGAP, GLU, BUN, CREA, GFRAA, GFRNA, CA, MG, PHOS, ALB, TBIL, TP, ALB, GLOB, AGRAT, SGOT, ALT, GPT  CBC: No results found for: WBC, HGB, HGBEXT, HCT, HCTEXT, PLT, PLTEXT, HGBEXT, HCTEXT, PLTEXT        Assessment/Plan     Principal Problem:    Acute renal failure (ARF) ([facility]) (2/14/2018)    Active Problems:    Traumatic brain injury ([facility] [address] (2/3/2018)      IDDM (insulin dependent diabetes mellitus) ([facility] [address] (2/3/2018)      Parkinson's disease ([facility] [address] (2/3/2018)      Psychiatric disorder (2/3/2018)      [facility]IL[facility] (2/22/2018)        Plan:will continue to try to taper down the iv fluids .

## 2018-02-25 NOTE — PROGRESS NOTES
0730 Report received from [facility]. Patient received in bed awake and alert x 4 and is able to make all his needs known at this time. Patient is continent of bowel and bladder and use the bedpan, he is noted with a Andujar cath draining ashleigh colored urine. Patient is currently on bed rest, and is unable to ambulate. Patient noted on 3 Liters of oxygen noted with clear lung sounds. Patient is noted with sacral stage I pressure area, and bilateral LE bruising. Plans are to discharge back to [facility] when medically stable. Patient currently c/o zero pain and is noted with zero s/s of distress.       Patient came in via EMS, for abnormal lab values and high blood sugar. Patient has a history of abnormal blood sugars dated back to age 13, Patient has a history of fall with traumatic brain injury and has a history of mental illness. He is alert x 4, with a delayed response, he has a history of anxiety with swallowing. Patient educated on medication needs. All medication given crushed in applesauce. K+ level at 2.9, Na level at 156, MD notified with new orders to start Dextrose 5 with 20 Meq of potassium at 50/hr, and discontinue NS fluids. Bedside and Verbal shift change report given to Arya Bernard (oncoming nurse) by Noemy Pedraza RN (offgoing nurse). Report included the following information SBAR, ED Summary, OR Summary, Procedure Summary, Intake/Output, MAR, Recent Results, Med Rec Status and Cardiac Rhythm Regular.

## 2018-02-25 NOTE — PROGRESS NOTES
Problem: Falls - Risk of  Goal: *Absence of Falls  Document Ernie Fall Risk and appropriate interventions in the flowsheet.    Outcome: Progressing Towards Goal  Fall Risk Interventions:       Mentation Interventions: Adequate sleep, hydration, pain control, Door open when patient unattended    Medication Interventions: Evaluate medications/consider consulting pharmacy    Elimination Interventions: Call light in reach

## 2018-02-26 LAB
ALBUMIN SERPL-MCNC: 1.9 G/DL (ref 3.4–5)
ALBUMIN/GLOB SERPL: 0.7 {RATIO} (ref 0.8–1.7)
ALP SERPL-CCNC: 94 U/L (ref 45–117)
ALT SERPL-CCNC: 17 U/L (ref 16–61)
ANION GAP SERPL CALC-SCNC: 4 MMOL/L (ref 3–18)
AST SERPL-CCNC: 18 U/L (ref 15–37)
BACTERIA SPEC CULT: ABNORMAL
BILIRUB SERPL-MCNC: 0.4 MG/DL (ref 0.2–1)
BUN SERPL-MCNC: 8 MG/DL (ref 7–18)
BUN/CREAT SERPL: 18 (ref 12–20)
CALCIUM SERPL-MCNC: 8.2 MG/DL (ref 8.5–10.1)
CHLORIDE SERPL-SCNC: 103 MMOL/L (ref 100–108)
CO2 SERPL-SCNC: 37 MMOL/L (ref 21–32)
CORTIS AM PEAK SERPL-MCNC: 12.5 UG/DL (ref 4.3–22.4)
CREAT SERPL-MCNC: 0.44 MG/DL (ref 0.6–1.3)
ERYTHROCYTE [DISTWIDTH] IN BLOOD BY AUTOMATED COUNT: 15.9 % (ref 11.6–14.5)
GLOBULIN SER CALC-MCNC: 2.8 G/DL (ref 2–4)
GLUCOSE BLD STRIP.AUTO-MCNC: 137 MG/DL (ref 70–110)
GLUCOSE BLD STRIP.AUTO-MCNC: 245 MG/DL (ref 70–110)
GLUCOSE BLD STRIP.AUTO-MCNC: 337 MG/DL (ref 70–110)
GLUCOSE BLD STRIP.AUTO-MCNC: 388 MG/DL (ref 70–110)
GLUCOSE SERPL-MCNC: 144 MG/DL (ref 74–99)
GRAM STN SPEC: ABNORMAL
GRAM STN SPEC: ABNORMAL
HCT VFR BLD AUTO: 29.6 % (ref 36–48)
HGB BLD-MCNC: 9.7 G/DL (ref 13–16)
MAGNESIUM SERPL-MCNC: 2.2 MG/DL (ref 1.6–2.6)
MCH RBC QN AUTO: 30.1 PG (ref 24–34)
MCHC RBC AUTO-ENTMCNC: 32.8 G/DL (ref 31–37)
MCV RBC AUTO: 91.9 FL (ref 74–97)
PLATELET # BLD AUTO: 112 K/UL (ref 135–420)
PMV BLD AUTO: 10.5 FL (ref 9.2–11.8)
POTASSIUM SERPL-SCNC: 2.9 MMOL/L (ref 3.5–5.5)
PROT SERPL-MCNC: 4.7 G/DL (ref 6.4–8.2)
RBC # BLD AUTO: 3.22 M/UL (ref 4.7–5.5)
SERVICE CMNT-IMP: ABNORMAL
SODIUM SERPL-SCNC: 144 MMOL/L (ref 136–145)
WBC # BLD AUTO: 5.5 K/UL (ref 4.6–13.2)

## 2018-02-26 PROCEDURE — 92610 EVALUATE SWALLOWING FUNCTION: CPT

## 2018-02-26 PROCEDURE — 82962 GLUCOSE BLOOD TEST: CPT

## 2018-02-26 PROCEDURE — 82533 TOTAL CORTISOL: CPT | Performed by: HOSPITALIST

## 2018-02-26 PROCEDURE — 36415 COLL VENOUS BLD VENIPUNCTURE: CPT | Performed by: INTERNAL MEDICINE

## 2018-02-26 PROCEDURE — 80053 COMPREHEN METABOLIC PANEL: CPT | Performed by: HOSPITALIST

## 2018-02-26 PROCEDURE — 74011636637 HC RX REV CODE- 636/637: Performed by: HOSPITALIST

## 2018-02-26 PROCEDURE — 84588 ASSAY OF VASOPRESSIN: CPT | Performed by: HOSPITALIST

## 2018-02-26 PROCEDURE — 74011250636 HC RX REV CODE- 250/636: Performed by: INTERNAL MEDICINE

## 2018-02-26 PROCEDURE — 83735 ASSAY OF MAGNESIUM: CPT | Performed by: INTERNAL MEDICINE

## 2018-02-26 PROCEDURE — 74011250637 HC RX REV CODE- 250/637: Performed by: HOSPITALIST

## 2018-02-26 PROCEDURE — 77010033678 HC OXYGEN DAILY

## 2018-02-26 PROCEDURE — 85027 COMPLETE CBC AUTOMATED: CPT | Performed by: HOSPITALIST

## 2018-02-26 PROCEDURE — 74011250637 HC RX REV CODE- 250/637: Performed by: NURSE PRACTITIONER

## 2018-02-26 PROCEDURE — 65270000029 HC RM PRIVATE

## 2018-02-26 PROCEDURE — 74011250636 HC RX REV CODE- 250/636: Performed by: HOSPITALIST

## 2018-02-26 PROCEDURE — 82024 ASSAY OF ACTH: CPT | Performed by: HOSPITALIST

## 2018-02-26 RX ORDER — POTASSIUM CHLORIDE 7.45 MG/ML
10 INJECTION INTRAVENOUS
Status: DISPENSED | OUTPATIENT
Start: 2018-02-26 | End: 2018-02-26

## 2018-02-26 RX ORDER — POTASSIUM CHLORIDE 7.45 MG/ML
10 INJECTION INTRAVENOUS
Status: DISPENSED | OUTPATIENT
Start: 2018-02-26 | End: 2018-02-27

## 2018-02-26 RX ORDER — POTASSIUM CHLORIDE 1.5 G/1.77G
40 POWDER, FOR SOLUTION ORAL EVERY 4 HOURS
Status: DISCONTINUED | OUTPATIENT
Start: 2018-02-26 | End: 2018-02-26

## 2018-02-26 RX ORDER — POTASSIUM CHLORIDE AND SODIUM CHLORIDE 450; 150 MG/100ML; MG/100ML
INJECTION, SOLUTION INTRAVENOUS CONTINUOUS
Status: DISCONTINUED | OUTPATIENT
Start: 2018-02-26 | End: 2018-03-03 | Stop reason: HOSPADM

## 2018-02-26 RX ORDER — POTASSIUM CHLORIDE 20MEQ/15ML
40 LIQUID (ML) ORAL EVERY 4 HOURS
Status: DISCONTINUED | OUTPATIENT
Start: 2018-02-26 | End: 2018-02-26 | Stop reason: CLARIF

## 2018-02-26 RX ADMIN — CITALOPRAM HYDROBROMIDE 40 MG: 20 TABLET ORAL at 10:44

## 2018-02-26 RX ADMIN — POTASSIUM CHLORIDE 10 MEQ: 7.46 INJECTION, SOLUTION INTRAVENOUS at 22:35

## 2018-02-26 RX ADMIN — INSULIN LISPRO 5 UNITS: 100 INJECTION, SOLUTION INTRAVENOUS; SUBCUTANEOUS at 21:18

## 2018-02-26 RX ADMIN — DEXTROSE MONOHYDRATE AND POTASSIUM CHLORIDE: 5; .149 INJECTION, SOLUTION INTRAVENOUS at 00:08

## 2018-02-26 RX ADMIN — PRAMIPEXOLE DIHYDROCHLORIDE 0.12 MG: 0.25 TABLET ORAL at 10:43

## 2018-02-26 RX ADMIN — MIDODRINE HYDROCHLORIDE 10 MG: 2.5 TABLET ORAL at 10:42

## 2018-02-26 RX ADMIN — Medication 400 MG: at 19:31

## 2018-02-26 RX ADMIN — PRAMIPEXOLE DIHYDROCHLORIDE 0.12 MG: 0.25 TABLET ORAL at 22:36

## 2018-02-26 RX ADMIN — PRAMIPEXOLE DIHYDROCHLORIDE 0.12 MG: 0.25 TABLET ORAL at 19:31

## 2018-02-26 RX ADMIN — POTASSIUM CHLORIDE 10 MEQ: 7.46 INJECTION, SOLUTION INTRAVENOUS at 19:35

## 2018-02-26 RX ADMIN — MODAFINIL 200 MG: 100 TABLET ORAL at 10:44

## 2018-02-26 RX ADMIN — POTASSIUM CHLORIDE 10 MEQ: 7.46 INJECTION, SOLUTION INTRAVENOUS at 20:54

## 2018-02-26 RX ADMIN — MIDODRINE HYDROCHLORIDE 10 MG: 2.5 TABLET ORAL at 19:30

## 2018-02-26 RX ADMIN — INSULIN LISPRO 4 UNITS: 100 INJECTION, SOLUTION INTRAVENOUS; SUBCUTANEOUS at 19:33

## 2018-02-26 RX ADMIN — INSULIN GLARGINE 4 UNITS: 100 INJECTION, SOLUTION SUBCUTANEOUS at 21:17

## 2018-02-26 RX ADMIN — SODIUM CHLORIDE AND POTASSIUM CHLORIDE: 4.5; 1.49 INJECTION, SOLUTION INTRAVENOUS at 10:59

## 2018-02-26 RX ADMIN — NORTRIPTYLINE HYDROCHLORIDE 150 MG: 25 CAPSULE ORAL at 22:36

## 2018-02-26 RX ADMIN — Medication 400 MG: at 10:42

## 2018-02-26 NOTE — PROGRESS NOTES
Medical Progress Note      NAME: Ahmet Nielson   :  1960  MRM:  [id number]    Date/Time: 2018  8:32 AM         Subjective:     \"im ok\"    Past Medical History reviewed and unchanged from Admission History and Physical    Review of Systems   Unable to perform ROS: Acuity of condition            Objective:     Poor historian. Noted Serum NA improved with fluids. Renal onboard. Replace Via PO K. MAG is ok. ADH/ACTH pending. FENA urine osmo pending. I&O's noted unlikely DI given output. Will order cortisol level. Will order MRI pituitary. Vitals:      Last 24hrs VS reviewed since prior progress note. Most recent are:    Visit Vitals    /61 (BP 1 Location: Right arm, BP Patient Position: At rest)    Pulse 66    Temp 98.1 °F (36.7 °C)    Resp 18    Ht 5' 6\" (1.676 m)    Wt 45.4 kg (100 lb 1.4 oz)    SpO2 100%    BMI 16.15 kg/m2     SpO2 Readings from Last 6 Encounters:   18 100%   18 100%   18 99%   18 99%    O2 Flow Rate (L/min): 3 l/min       Intake/Output Summary (Last 24 hours) at 18 0833  Last data filed at 18 0659   Gross per 24 hour   Intake           2252.5 ml   Output             1426 ml   Net            826.5 ml          Exam:      Physical Exam   Constitutional: No distress. HENT:   Head: Normocephalic. Eyes: Pupils are equal, round, and reactive to light. Cardiovascular: Normal rate. Pulmonary/Chest: Effort normal and breath sounds normal.   Abdominal: Soft. Bowel sounds are normal.   Musculoskeletal: Normal range of motion. Neurological: He is alert. Skin: Skin is warm and dry. He is not diaphoretic.              Medications:  Current Facility-Administered Medications   Medication Dose Route Frequency    potassium chloride (KLOR-CON) packet 40 mEq  40 mEq Oral Q4H    dextrose 5% with KCl 20 mEq/L infusion   IntraVENous CONTINUOUS    magnesium oxide (MAG-OX) tablet 400 mg  400 mg Oral BID    influenza vaccine  (3 yrs+)(PF) (FLUZONE QUAD/FLUARIX QUAD) injection 0.5 mL  0.5 mL IntraMUSCular PRIOR TO DISCHARGE    insulin glargine (LANTUS) injection 4 Units  4 Units SubCUTAneous QHS    citalopram (CELEXA) tablet 40 mg  40 mg Oral DAILY    midodrine (PROAMITINE) tablet 10 mg  10 mg Oral TID WITH MEALS    modafinil (PROVIGIL) tablet 200 mg  200 mg Oral DAILY    nortriptyline (PAMELOR) capsule 150 mg  150 mg Oral QHS    pramipexole (MIRAPEX) tablet 0.125 mg  0.125 mg Oral TID    acetaminophen (TYLENOL) tablet 650 mg  650 mg Oral Q4H PRN    ondansetron (ZOFRAN) injection 4 mg  4 mg IntraVENous Q4H PRN    insulin lispro (HUMALOG) injection   SubCUTAneous AC&HS    glucose chewable tablet 16 g  4 Tab Oral PRN    glucagon (GLUCAGEN) injection 1 mg  1 mg IntraMUSCular PRN    dextrose (D50W) injection syrg 12.5-25 g  25-50 mL IntraVENous PRN       ______________________________________________________________________      Lab Review:     Recent Labs      02/26/18   0446  02/25/18   0515   WBC  5.5  9.7   HGB  9.7*  11.7*   HCT  29.6*  37.5   PLT  112*  191     Recent Labs      02/26/18   0446  02/25/18   0515   NA  144  156*   K  2.9*  2.9*   CL  103  116*   CO2  37*  28   GLU  144*  31*   BUN  8  15   CREA  0.44*  0.65   CA  8.2*  8.3*   MG  2.2  1.7   ALB  1.9*  2.2*   SGOT  18  28   ALT  17  23     No components found for: GLPOC  No results for input(s): PH, PCO2, PO2, HCO3, FIO2 in the last 72 hours. No results for input(s): INR in the last 72 hours.     No lab exists for component: INREXT, INREXT, INREXT             Assessment:     Principal Problem:    Acute renal failure (ARF) ([address] (2/14/2018)    Active Problems:    Traumatic brain injury ([facility] [address] (2/3/2018)      IDDM (insulin dependent diabetes mellitus) ([facility] [address] (2/3/2018)      Parkinson's disease ([facility] [address] (2/3/2018)      Psychiatric disorder (2/3/2018)      [facility] (2/22/2018)           Plan:       Acute Renal Failure'  -resolved  -2/2 dehydration  -IVF for now  -l2rzogk weight and I&O's     Hypovolemic Hypernatermia  -renal onboard  -serial BMP  -IVF for now as per Renal  -do not think DI given output     Hypotension  -2/2 dehydration  -do not suspect infectious etiology  -blood cultures done NGTD  -improved with IVF  -hold abx for now  -midodrine  -UA ok     Deconditioning  -PT/OT     IDDM  -corrective insulin  -basal dosing     Psych disorder/Parkinsons  -mirapex  -celexa     Hx of C-diff  -S/p completion of po vanc  -last negative for c-diff     DVT prophylaxis  -scd/teds  -heparin sq         ___________________________________________________    Attending Physician: Murali Jackson, NP

## 2018-02-26 NOTE — PROGRESS NOTES
1935 Bedside and Verbal shift change report given to Jose E Bernardo (oncoming nurse) by Itz Drake RN (off going nurse). Report included the following information SBAR, Kardex, [facility] and Recent Results,nsr       2025 Shift assessment completed,patient is a/o x 2,with periodic confus ,call bell within reach ,bed lowered and locked, no signs of distress noted      2140 due medications given,no adverse reactions observed, well tolerated      0025 Shift reassessment completed,patient soundly sleeping, no changes in previous assessment      0429 reassessment ,call bell within reach no signs of distress noted      Bedside and Verbal shift change report given to [facility] (oncoming nurse) by Madhu Perera  RN (off going nurse).  Report included the following information SBAR, Kardex, [facility] and Recent Results

## 2018-02-26 NOTE — PROGRESS NOTES
Problem: Dysphagia (Adult)  Goal: *Acute Goals and Plan of Care (Insert Text)  Dysphagia Present:   No    Recommendations:  Diet: regular solids/thin liquid  Meds: one at a time  Aspiration Precautions    Patient will:  1. Participate in training and education related to continued aspiration risk, diet recs and compensatory strategies (goal met). Outcome: Resolved/Met Date Met: 02/26/18  Speech LAnguage Pathology bedside swallow evaluation AND DISCHARGE    Patient: Paras Duncan (48 y.o. male)  Date: 2/26/2018  Primary Diagnosis: SILVERIO (acute kidney injury) ([facility] [address]        Precautions: n/a       ASSESSMENT :  Clinical beside swallow eval completed per MD orders. Pt A&Ox4. Functional communication. Intelligibility >90%. Cognitive-linguistic function appears intact. OM examination revealed oral motor structures functional for mastication and deglutition. Presented with thin liquid, puree, and solid trials. Exhibited (+) bolus cohesion, manipulation and A-P transit. Further exhibited (+) swallow timing/reflex and hyolaryngeal excursion. Pt able to manipulate and clear with 0 clinical s/s aspiration and/or oropharyngeal dysphagia. Today's examination consistent with previous hospitalization; pt requires ongoing encouragement for po intake. While pt demo functional sensory/motor awareness/strength this visit, pt refuses adequate nutritional intake. At this time, pt safest to continue diet of regular solids/thin liquids. May need to consider PEG if unable to meet nutritional needs via po diet alone; however, preferred recommendation is QOL/comfort approach to nutrition due to multiple comorbidities. 0 formal ST needs for dysphagia indicated at this time. SLP educated pt on role of speech therapist in current setting with re: speech/swallow; verbalized comprehension. SLP available for re-evaluation if indicated by MD. Results d/w RN.      Thank you for this referral.   Antonio Parham, SLP Intern     PLAN :  Recommendations and Planned Interventions:  No formal ST needs ID'd for dysphagia. Eval only. Discharge Recommendations: Skilled Nursing Facility     SUBJECTIVE:   Patient stated it's too much, I just don't want any more. OBJECTIVE:     Past Medical History:   Diagnosis Date    Anxiety     Diabetes ([address]     Ill-defined condition     hypotension    Psychiatric disorder     depression   History reviewed. No pertinent surgical history. Prior Level of Function/Home Situation:SNF   Home Situation  Home Environment: [address] [place] [address] Name: kaleb ruano  One/Two Story Residence: Other (Comment)  Living Alone: No  Support Systems: Skilled nursing facility  Patient Expects to be Discharged to[de-identified] Assisted living  Current DME Used/Available at Home: Other (comment)  Diet prior to admission: regular/thin  Current Diet:  Regular/thin   Cognitive and Communication Status:  Neurologic State: Alert  Orientation Level: Oriented X4  Cognition: Follows commands  Perception: Appears intact  Perseveration: No perseveration noted     Oral Assessment:  Oral Assessment  Labial: No impairment  Dentition: Limited;Natural  Oral Hygiene: fair  Lingual: No impairment  Velum: No impairment  Mandible: No impairment  Gag Reflex: No impairment  P.O. Trials:  Patient Position: HOB45  Vocal quality prior to P.O.: Strain; Low volume  Consistency Presented: Thin liquid; Solid;Puree  How Presented: SLP-fed/presented; Successive swallows;Straw;Spoon     Bolus Acceptance: No impairment        Propulsion: No impairment  Oral Residue: None  Initiation of Swallow: No impairment  Laryngeal Elevation: Functional  Aspiration Signs/Symptoms: None  Pharyngeal Phase Characteristics: No impairment, issues, or problems   Effective Modifications: None  Cues for Modifications: None       Oral Phase Severity: No impairment  Pharyngeal Phase Severity : No impairment    GCODESwallowing:  Swallow Current Status CH= 0%   Swallow Goal Status CH= 0%   Swallow D/C Status CH= 0%    The severity rating is based on the following outcomes:  BRADY Noms Swallow Level 7    Clinical Judgement      PAIN:  Start of Eval: 0  End of Eval: 0     After evaluation:   []            Patient left in no apparent distress sitting up in chair  [x]            Patient left in no apparent distress in bed  [x]            Call bell left within reach  [x]            Nursing notified  []            Family present  []            Caregiver present  []            Bed alarm activated      COMMUNICATION/EDUCATION:   []            Aspiration precautions; swallow safety; compensatory techniques. [x]            Patient/family have participated as able in goal setting and plan of care. []            Patient/family agree to work toward stated goals and plan of care. []            Patient understands intent and goals of therapy; neutral about participation. []            Patient unable to participate in goal setting/plan of care; educ ongoing with interdisciplinary staff  []         Posted safety precautions in patient's room.     Thank you for this referral.  Negrita Parham, SLP Intern  Time Calculation: 8 mins

## 2018-02-26 NOTE — DIABETES MGMT
Nutrition/Glycemic Control:  Pt with hypoglycemia this am. Pt is receiving 4 units Lantus q 24 hrs. and corrective lispro. Lowered corrective scale as pt is insulin sensitive.     Recent Glucose Results:   Lab Results   Component Value Date/Time    GLU 31 (LL) 02/25/2018 05:15 AM    GLUCPOC 304 (H) 02/25/2018 05:19 PM    GLUCPOC 191 (H) 02/25/2018 11:54 AM    GLUCPOC 93 02/25/2018 08:34 AM     Jkaob MILLAN

## 2018-02-26 NOTE — CDMP QUERY
Please clarify if this patient is being treated/managed for:    =>Severe Protein Calorie Malnutrition in the setting of low BMI, dysphagia, cachexia  =>Other Explanation of clinical findings  =>Unable to Determine (no explanation of clinical findings)    The medical record reflects the following:    Risk:Chronically ill pt    Clinical Indicators:Note by Dietitian stated--\"Nutritional Status:   Pt is 70% ideal wt with thin appearance, history of poor appetite with unintentional weight loss and dysphagia. Cachectic appearance.      Nutrition Diagnoses:  Underweight due to inadequate energy intake as evidenced by BMI of 16.1 kg/m2. Inadequate oral food and beverage intake due to dysphagia in setting of Parkinson's disease as evidenced by history of poor po intake at prior admissions. Unintentional weight loss due to decreased appetite as evidenced by documented weight loss of 16 lbs (14%) in past 1.5 months. Pt also with reported 35 lb weight loss in past 6 months and 65 lb weight loss in past 1.5 years. Difficulty swallowing due to dysphagia in the setting of Parkinson's disease as evidenced history of texture altered diet orders. Altered GI function due to C. Diff as evidenced by recent treatment w/ oral Vancomycin . Albumin=1.9, BMI=16.64    Treatment:  possible PEG instertion,  1/2 NS @ 50ml    Please clarify and document your clinical opinion in the progress notes and discharge summary including the definitive and/or presumptive diagnosis, (suspected or probable), related to the above clinical findings. Please include clinical findings supporting your diagnosis. If you DECLINE this query or would like to communicate with [facility], please utilize the \"[facility] message box\" at the TOP of the Progress Note on the right.       Thank you,  Le Ayoub RN [facility]  [phone]

## 2018-02-26 NOTE — MANAGEMENT PLAN
Discharge Planning    0915:Spoke with brother. He may want pt at [facility] for long term instead of returning to [facility]. [facility].  He would like update from [phone]: Spoke with [place] at [facility] and they can accept        Kasie Whitney # [phone]

## 2018-02-26 NOTE — PROGRESS NOTES
RENAL PROGRESS NOTE        Lorenzo Noriega         Assessment/Plan:     · SILVERIO due to volume depletion. Resolved. · Recurrent hypovolemic hypernatremia. Improving, continue to encourage oral intake of free water. Will soon stop ivf to assess of oral food/fluid intake is adequate. Check urine osmolality, although don't suspect DI. · Hypokalemia. Agree with replacement. Subjective:  Patient complaints off: No complaints. Sleepy but arousalbe, appropriate. No SOB/CP/N/V. States appetite is very little. States he is thirsty and is drinking water. Patient Active Problem List   Diagnosis Code    C. difficile colitis A04.72    Traumatic brain injury ([facility] S06. 9X5A    IDDM (insulin dependent diabetes mellitus) ([facility] E11.9, Z79.4    Parkinson's disease ([facility] G20    Psychiatric disorder F99    Acute renal failure (ARF) ([facility] N17.9    SILVERIO (acute kidney injury) ([facility] N17.9       Current Facility-Administered Medications   Medication Dose Route Frequency Provider Last Rate Last Dose    potassium chloride (KLOR-CON) packet 40 mEq  40 mEq Oral Q4H Terry Javed MD        0.45% sodium chloride with KCl 20 mEq/L infusion   IntraVENous CONTINUOUS Terry Javed MD 50 mL/hr at 02/26/18 1059      magnesium oxide (MAG-OX) tablet 400 mg  400 mg Oral BID Max Barajas MD   400 mg at 02/26/18 1042    influenza vaccine 2017-18 (3 yrs+)(PF) (FLUZONE QUAD/FLUARIX QUAD) injection 0.5 mL  0.5 mL IntraMUSCular PRIOR TO DISCHARGE Max Barajas MD        insulin glargine (LANTUS) injection 4 Units  4 Units SubCUTAneous QHS Max Barajas MD   4 Units at 02/25/18 2139    citalopram (CELEXA) tablet 40 mg  40 mg Oral DAILY [place] Innocent, NP   40 mg at 02/26/18 1044    midodrine (PROAMITINE) tablet 10 mg  10 mg Oral TID WITH MEALS [place] Innocent, NP   10 mg at 02/26/18 1042    modafinil (PROVIGIL) tablet 200 mg  200 mg Oral DAILY Yusef Mink, NP   200 mg at 02/26/18 1044    nortriptyline (PAMELOR) capsule 150 mg  150 mg Oral QHS Yusef Mink, NP   150 mg at 02/25/18 2140    pramipexole (MIRAPEX) tablet 0.125 mg  0.125 mg Oral TID Yusef Mink, NP   0.125 mg at 02/26/18 1043    acetaminophen (TYLENOL) tablet 650 mg  650 mg Oral Q4H PRN Yusef Mink, NP        ondansetron [facility]) injection 4 mg  4 mg IntraVENous Q4H PRN Yusef Mink, NP        insulin lispro (HUMALOG) injection   SubCUTAneous AC&HS Jared Obando MD   Stopped at 02/26/18 0730    glucose chewable tablet 16 g  4 Tab Oral PRN Yusef Mink, NP   16 g at 02/25/18 0747    glucagon (GLUCAGEN) injection 1 mg  1 mg IntraMUSCular PRN Yusef Mink, NP   1 mg at 02/23/18 1144    dextrose (D50W) injection syrg 12.5-25 g  25-50 mL IntraVENous PRN Yusef Mink, NP           Objective  Vitals:    02/25/18 2132 02/26/18 0006 02/26/18 0550 02/26/18 0850   BP: 102/64 117/60 102/61 114/64   Pulse: 62 61 66 60   Resp: 20 20 18 18   Temp: 97.8 °F (36.6 °C) 97.9 °F (36.6 °C) 98.1 °F (36.7 °C) 98.3 °F (36.8 °C)   SpO2: 100% 99% 100% 99%   Weight:   45.4 kg (100 lb 1.4 oz)    Height:             Intake/Output Summary (Last 24 hours) at 02/26/18 1138  Last data filed at 02/26/18 0659   Gross per 24 hour   Intake           2012.5 ml   Output             1426 ml   Net            586.5 ml           Admission weight: Weight: 45.4 kg (100 lb) (02/22/18 1118)  Last Weight Metrics:  Weight Loss Metrics 2/26/2018 2/16/2018 2/8/2018 1/12/2018   Today's Wt 100 lb 1.4 oz 119 lb 14.9 oz 124 lb 116 lb   BMI 16.15 kg/m2 19.36 kg/m2 20.01 kg/m2 18.72 kg/m2             Physical Assessment:     General: NAD, alert and oriented. Neck: No jvd. LUNGS: Clear to Auscultation, No rales, rhonchi or wheezes. CVS EXM: S1, S2  RRR, no murmurs/gallops/rubs. Abdomen: soft, non tender. Lower Extremities:  no edema.        Lab    CBC w/Diff Recent Labs      02/26/18   0446  02/25/18   0515 WBC  5.5  9.7   RBC  3.22*  3.94*   HGB  9.7*  11.7*   HCT  29.6*  37.5   PLT  112*  191   GRANS   --   65   LYMPH   --   28   EOS   --   0        Chemistry Recent Labs      02/26/18   0446  02/25/18   0515   GLU  144*  31*   NA  144  156*   K  2.9*  2.9*   CL  103  116*   CO2  37*  28   BUN  8  15   CREA  0.44*  0.65   CA  8.2*  8.3*   AGAP  4  12   BUCR  18  23*   AP  94  97   TP  4.7*  5.1*   ALB  1.9*  2.2*   GLOB  2.8  2.9   AGRAT  0.7*  0.8         No results found for: IRON, FE, TIBC, IBCT, PSAT, FERR Lab Results   Component Value Date/Time    Calcium 8.2 (L) 02/26/2018 04:46 AM        Nabeel Raygoza M.D.   [facility]  Phone

## 2018-02-26 NOTE — PROGRESS NOTES
Problem: Falls - Risk of  Goal: *Absence of Falls  Document Ernie Fall Risk and appropriate interventions in the flowsheet.    Outcome: Progressing Towards Goal  Fall Risk Interventions:       Mentation Interventions: Adequate sleep, hydration, pain control    Medication Interventions: Patient to call before getting OOB, Teach patient to arise slowly    Elimination Interventions: Call light in reach, Patient to call for help with toileting needs, Toileting schedule/hourly rounds

## 2018-02-26 NOTE — PROGRESS NOTES
Progress Note      Patient: Sophia Bridges               Sex: male          DOA: 2/22/2018       YOB: 1960      Age:  56 y.o.        LOS:  LOS: 3 days               Subjective:   Help of renal greatly appreciated the patient is a recurrent visitor to the er with multiple admissions for dehydration and hypoglycemia . The problem seems to   Be the nature of his brain trauma and how this has affected the way he handles fluids  and his glucose . Suspect  that the pituitary has been affected . He will need an mri of the pituitary . Given enough fluids  he might be seen to develop diabetes insipidus he is always thirsty and he is dehydrated . He will need an adh level ,acth level and a cortisol level as well as an  Mr Priya Drilling the pituitary . He may need a peg . pt also seems to have amental illness but this may not betrue given the fact that he had trauma .          Objective:      Visit Vitals    /64 (BP 1 Location: Right arm, BP Patient Position: At rest)    Pulse 62    Temp 97.8 °F (36.6 °C)    Resp 20    Ht 5' 6\" (1.676 m)    Wt 46.8 kg (103 lb 1.6 oz)    SpO2 100%    BMI 16.64 kg/m2       Physical Exam:  Pt is awake and appears to be comfortable   Heart reg rate and rhythm   Lungs good breath sounds heard   Abdomen soft ad no pain on palpation   Neuro no focal weakness         Lab/Data Reviewed:  BMP:   Lab Results   Component Value Date/Time     (H) 02/25/2018 05:15 AM    K 2.9 (LL) 02/25/2018 05:15 AM     (H) 02/25/2018 05:15 AM    CO2 28 02/25/2018 05:15 AM    AGAP 12 02/25/2018 05:15 AM    GLU 31 (LL) 02/25/2018 05:15 AM    BUN 15 02/25/2018 05:15 AM    CREA 0.65 02/25/2018 05:15 AM    GFRAA >60 02/25/2018 05:15 AM    GFRNA >60 02/25/2018 05:15 AM     CMP:   Lab Results   Component Value Date/Time     (H) 02/25/2018 05:15 AM    K 2.9 (LL) 02/25/2018 05:15 AM     (H) 02/25/2018 05:15 AM    CO2 28 02/25/2018 05:15 AM    AGAP 12 02/25/2018 05:15 AM    GLU 31 () 02/25/2018 05:15 AM    BUN 15 02/25/2018 05:15 AM    CREA 0.65 02/25/2018 05:15 AM    GFRAA >60 02/25/2018 05:15 AM    GFRNA >60 02/25/2018 05:15 AM    CA 8.3 (L) 02/25/2018 05:15 AM    MG 1.7 02/25/2018 05:15 AM    ALB 2.2 (L) 02/25/2018 05:15 AM    TP 5.1 (L) 02/25/2018 05:15 AM    GLOB 2.9 02/25/2018 05:15 AM    AGRAT 0.8 02/25/2018 05:15 AM    SGOT 28 02/25/2018 05:15 AM    ALT 23 02/25/2018 05:15 AM     CBC:   Lab Results   Component Value Date/Time    WBC 9.7 02/25/2018 05:15 AM    HGB 11.7 (L) 02/25/2018 05:15 AM    HCT 37.5 02/25/2018 05:15 AM     02/25/2018 05:15 AM           Assessment/Plan     Principal Problem:    Acute renal failure (ARF) ([facility] (2/14/2018)    Active Problems:    Traumatic brain injury ([facility] (2/3/2018)      IDDM (insulin dependent diabetes mellitus) ([facility] (2/3/2018)      Parkinson's disease ([facility] (2/3/2018)      Psychiatric disorder (2/3/2018)      SILVERIO (acute kidney injury) ([facility] (2/22/2018)        Plan:pt angel lneed to be worked up next week .

## 2018-02-27 LAB
ACTH PLAS-MCNC: 13.5 PG/ML (ref 7.2–63.3)
ANION GAP SERPL CALC-SCNC: 5 MMOL/L (ref 3–18)
BUN SERPL-MCNC: 10 MG/DL (ref 7–18)
BUN/CREAT SERPL: 26 (ref 12–20)
CALCIUM SERPL-MCNC: 8.5 MG/DL (ref 8.5–10.1)
CHLORIDE SERPL-SCNC: 108 MMOL/L (ref 100–108)
CO2 SERPL-SCNC: 34 MMOL/L (ref 21–32)
CORTIS AM PEAK SERPL-MCNC: 8.4 UG/DL (ref 4.3–22.4)
CREAT SERPL-MCNC: 0.39 MG/DL (ref 0.6–1.3)
ERYTHROCYTE [DISTWIDTH] IN BLOOD BY AUTOMATED COUNT: 15.6 % (ref 11.6–14.5)
GLUCOSE BLD STRIP.AUTO-MCNC: 135 MG/DL (ref 70–110)
GLUCOSE BLD STRIP.AUTO-MCNC: 167 MG/DL (ref 70–110)
GLUCOSE BLD STRIP.AUTO-MCNC: 168 MG/DL (ref 70–110)
GLUCOSE BLD STRIP.AUTO-MCNC: 255 MG/DL (ref 70–110)
GLUCOSE BLD STRIP.AUTO-MCNC: 45 MG/DL (ref 70–110)
GLUCOSE SERPL-MCNC: 36 MG/DL (ref 74–99)
HCT VFR BLD AUTO: 30.3 % (ref 36–48)
HGB BLD-MCNC: 9.9 G/DL (ref 13–16)
MCH RBC QN AUTO: 29.8 PG (ref 24–34)
MCHC RBC AUTO-ENTMCNC: 32.7 G/DL (ref 31–37)
MCV RBC AUTO: 91.3 FL (ref 74–97)
PLATELET # BLD AUTO: 105 K/UL (ref 135–420)
PMV BLD AUTO: 11.7 FL (ref 9.2–11.8)
POTASSIUM SERPL-SCNC: 3.8 MMOL/L (ref 3.5–5.5)
RBC # BLD AUTO: 3.32 M/UL (ref 4.7–5.5)
SODIUM SERPL-SCNC: 147 MMOL/L (ref 136–145)
WBC # BLD AUTO: 4.6 K/UL (ref 4.6–13.2)

## 2018-02-27 PROCEDURE — 82962 GLUCOSE BLOOD TEST: CPT

## 2018-02-27 PROCEDURE — 36415 COLL VENOUS BLD VENIPUNCTURE: CPT | Performed by: NURSE PRACTITIONER

## 2018-02-27 PROCEDURE — 77010033678 HC OXYGEN DAILY

## 2018-02-27 PROCEDURE — 65270000029 HC RM PRIVATE

## 2018-02-27 PROCEDURE — 74011636637 HC RX REV CODE- 636/637: Performed by: HOSPITALIST

## 2018-02-27 PROCEDURE — 74011250636 HC RX REV CODE- 250/636: Performed by: HOSPITALIST

## 2018-02-27 PROCEDURE — 80048 BASIC METABOLIC PNL TOTAL CA: CPT | Performed by: NURSE PRACTITIONER

## 2018-02-27 PROCEDURE — 74011250636 HC RX REV CODE- 250/636: Performed by: NURSE PRACTITIONER

## 2018-02-27 PROCEDURE — 82533 TOTAL CORTISOL: CPT | Performed by: NURSE PRACTITIONER

## 2018-02-27 PROCEDURE — 74011250637 HC RX REV CODE- 250/637: Performed by: HOSPITALIST

## 2018-02-27 PROCEDURE — 74011250637 HC RX REV CODE- 250/637: Performed by: NURSE PRACTITIONER

## 2018-02-27 PROCEDURE — 85027 COMPLETE CBC AUTOMATED: CPT | Performed by: NURSE PRACTITIONER

## 2018-02-27 RX ORDER — INSULIN GLARGINE 100 [IU]/ML
2 INJECTION, SOLUTION SUBCUTANEOUS
Status: DISCONTINUED | OUTPATIENT
Start: 2018-02-27 | End: 2018-02-28

## 2018-02-27 RX ORDER — POTASSIUM CHLORIDE 7.45 MG/ML
10 INJECTION INTRAVENOUS
Status: COMPLETED | OUTPATIENT
Start: 2018-02-27 | End: 2018-02-28

## 2018-02-27 RX ADMIN — INSULIN LISPRO 1 UNITS: 100 INJECTION, SOLUTION INTRAVENOUS; SUBCUTANEOUS at 11:30

## 2018-02-27 RX ADMIN — NORTRIPTYLINE HYDROCHLORIDE 150 MG: 25 CAPSULE ORAL at 22:29

## 2018-02-27 RX ADMIN — SODIUM CHLORIDE AND POTASSIUM CHLORIDE: 4.5; 1.49 INJECTION, SOLUTION INTRAVENOUS at 06:44

## 2018-02-27 RX ADMIN — MODAFINIL 200 MG: 100 TABLET ORAL at 10:11

## 2018-02-27 RX ADMIN — PRAMIPEXOLE DIHYDROCHLORIDE 0.12 MG: 0.25 TABLET ORAL at 17:45

## 2018-02-27 RX ADMIN — MIDODRINE HYDROCHLORIDE 10 MG: 2.5 TABLET ORAL at 17:42

## 2018-02-27 RX ADMIN — MIDODRINE HYDROCHLORIDE 10 MG: 2.5 TABLET ORAL at 15:45

## 2018-02-27 RX ADMIN — INSULIN GLARGINE 2 UNITS: 100 INJECTION, SOLUTION SUBCUTANEOUS at 17:47

## 2018-02-27 RX ADMIN — Medication 400 MG: at 17:44

## 2018-02-27 RX ADMIN — POTASSIUM CHLORIDE 10 MEQ: 7.46 INJECTION, SOLUTION INTRAVENOUS at 03:35

## 2018-02-27 RX ADMIN — PRAMIPEXOLE DIHYDROCHLORIDE 0.12 MG: 0.25 TABLET ORAL at 22:30

## 2018-02-27 RX ADMIN — INSULIN LISPRO 3 UNITS: 100 INJECTION, SOLUTION INTRAVENOUS; SUBCUTANEOUS at 17:46

## 2018-02-27 RX ADMIN — Medication 400 MG: at 10:11

## 2018-02-27 RX ADMIN — MIDODRINE HYDROCHLORIDE 10 MG: 2.5 TABLET ORAL at 10:10

## 2018-02-27 RX ADMIN — CITALOPRAM HYDROBROMIDE 40 MG: 20 TABLET ORAL at 10:11

## 2018-02-27 RX ADMIN — POTASSIUM CHLORIDE 10 MEQ: 7.46 INJECTION, SOLUTION INTRAVENOUS at 02:31

## 2018-02-27 RX ADMIN — PRAMIPEXOLE DIHYDROCHLORIDE 0.12 MG: 0.25 TABLET ORAL at 10:11

## 2018-02-27 RX ADMIN — POTASSIUM CHLORIDE 10 MEQ: 7.46 INJECTION, SOLUTION INTRAVENOUS at 01:31

## 2018-02-27 RX ADMIN — GLUCAGON HYDROCHLORIDE 1 MG: KIT at 07:11

## 2018-02-27 NOTE — PROGRESS NOTES
RENAL PROGRESS NOTE        Lorenzo Noriega         Assessment/Plan:     · SILVERIO due to volume depletion. Resolved. · Recurrent hypovolemic hypernatremia. Na is up slightly. Continue to encourage oral intake of free water. Will d/ci ivf to assess if oral food/fluid intake is adequate. I have again ordered urine osmolality, although don't suspect DI. · Hypokalemia. Agree with replacement. Subjective:  Patient complaints off: No complaints. Sleepy but arousalbe, appropriate. No SOB/CP/N/V. States appetite is a little better. States he is thirsty and is drinking water. Patient Active Problem List   Diagnosis Code    C. difficile colitis A04.72    Traumatic brain injury ([facility] S06. 9X5A    [facility] mellitus) [facility] E11.9, Z79.4    Parkinson's disease ([facility] G20    Psychiatric disorder F99    Acute renal failure (ARF) ([facility] N17.9    SILVERIO (acute kidney injury) ([facility] N17.9       Current Facility-Administered Medications   Medication Dose Route Frequency Provider Last Rate Last Dose    insulin glargine (LANTUS) injection 2 Units  2 Units SubCUTAneous ACD Marilee Alvarez MD        0.45% sodium chloride with KCl 20 mEq/L infusion   IntraVENous CONTINUOUS Marilee Alvarez MD 50 mL/hr at 02/27/18 0644      magnesium oxide (MAG-OX) tablet 400 mg  400 mg Oral BID Marylee Budd, MD   400 mg at 02/27/18 1011    influenza vaccine 2017-18 (3 yrs+)(PF) (FLUZONE QUAD/FLUARIX QUAD) injection 0.5 mL  0.5 mL IntraMUSCular PRIOR TO DISCHARGE Marylee Budd, MD        citalopram (CELEXA) tablet 40 mg  40 mg Oral DAILY Judit Alcala NP   40 mg at 02/27/18 1011    midodrine (PROAMITINE) tablet 10 mg  10 mg Oral TID WITH MEALS Judit Alcala NP   10 mg at 02/27/18 1545    modafinil (PROVIGIL) tablet 200 mg  200 mg Oral DAILY Judit Alcala NP   200 mg at 02/27/18 1011    nortriptyline (PAMELOR) capsule 150 mg  150 mg Oral QHS Yusef Mink, NP   150 mg at 02/26/18 2236    pramipexole (MIRAPEX) tablet 0.125 mg  0.125 mg Oral TID Yusef Mink, NP   0.125 mg at 02/27/18 1011    acetaminophen (TYLENOL) tablet 650 mg  650 mg Oral Q4H PRN Yusef Mink, NP        ondansetron [facility]) injection 4 mg  4 mg IntraVENous Q4H PRN Yusef Mink, NP        insulin lispro (HUMALOG) injection   SubCUTAneous AC&HS Jared Obando MD   1 Units at 02/27/18 1130    glucose chewable tablet 16 g  4 Tab Oral PRN Yusef Mink, NP   16 g at 02/25/18 0747    glucagon (GLUCAGEN) injection 1 mg  1 mg IntraMUSCular PRN Yusef Mink, NP   1 mg at 02/27/18 0711    dextrose (D50W) injection syrg 12.5-25 g  25-50 mL IntraVENous PRN Yusef Mink, NP           Objective  Vitals:    02/27/18 0538 02/27/18 0844 02/27/18 1144 02/27/18 1509   BP: 112/61 92/51 91/57 93/58   Pulse: 78 85 67 65   Resp: 18 18 18 18   Temp: 98.6 °F (37 °C) 98.2 °F (36.8 °C) 97.5 °F (36.4 °C) 98.2 °F (36.8 °C)   SpO2: 99% 98% 100% 98%   Weight: 45.4 kg (100 lb 1.4 oz)      Height:             Intake/Output Summary (Last 24 hours) at 02/27/18 1631  Last data filed at 02/27/18 1423   Gross per 24 hour   Intake             2110 ml   Output             1800 ml   Net              310 ml           Admission weight: Weight: 45.4 kg (100 lb) (02/22/18 1118)  Last Weight Metrics:  Weight Loss Metrics 2/27/2018 2/16/2018 2/8/2018 1/12/2018   Today's Wt 100 lb 1.4 oz 119 lb 14.9 oz 124 lb 116 lb   BMI 16.15 kg/m2 19.36 kg/m2 20.01 kg/m2 18.72 kg/m2             Physical Assessment:     General: NAD, alert and oriented. Neck: No jvd. LUNGS: Clear to Auscultation, No rales, rhonchi or wheezes. CVS EXM: S1, S2  RRR, no murmurs/gallops/rubs. Abdomen: soft, non tender. Lower Extremities:  no edema.        Lab    CBC w/Diff Recent Labs      02/27/18   0451  02/26/18   0446  02/25/18   0515   WBC  4.6  5.5  9.7   RBC  3.32*  3.22*  3.94*   HGB  9.9*  9.7*  11.7* HCT  30.3*  29.6*  37.5   PLT  105*  112*  191   GRANS   --    --   65   LYMPH   --    --   28   EOS   --    --   0        Chemistry Recent Labs      02/27/18   0451  02/26/18   0446  02/25/18   0515   GLU  36*  144*  31*   NA  147*  144  156*   K  3.8  2.9*  2.9*   CL  108  103  116*   CO2  34*  37*  28   BUN  10  8  15   CREA  0.39*  0.44*  0.65   CA  8.5  8.2*  8.3*   AGAP  5  4  12   BUCR  26*  18  23*   AP   --   94  97   TP   --   4.7*  5.1*   ALB   --   1.9*  2.2*   GLOB   --   2.8  2.9   AGRAT   --   0.7*  0.8         No results found for: IRON, FE, TIBC, IBCT, PSAT, FERR   Lab Results   Component Value Date/Time    Calcium 8.5 02/27/2018 04:51 AM        Maricarmen Shields M.D.   [facility]  Phone

## 2018-02-27 NOTE — PROGRESS NOTES
Problem: Falls - Risk of  Goal: *Absence of Falls  Document Ernie Fall Risk and appropriate interventions in the flowsheet.    Outcome: Progressing Towards Goal  Fall Risk Interventions:       Mentation Interventions: Adequate sleep, hydration, pain control    Medication Interventions: Evaluate medications/consider consulting pharmacy    Elimination Interventions: Call light in reach

## 2018-02-27 NOTE — PROGRESS NOTES
1950 Bedside and Verbal shift change report given to Madhu Perera Rn (oncoming nurse) by Itz Drake RN (off going nurse). Report included the following information SBAR, Kardex, [facility] and Recent Results,cardiac rhythm NSR      2033 Shift assessment completed,patient is a/o x 4 with periodic confusion and delayed responses, ,call bell within reach ,bed lowered and locked, no signs of distress noted      2236 due medications given,patient has a problem with taking oral meds ,complain that he can't swallow,patient has passed a swallow eval has no swallowing problems,but he is very anxious about swallowing      0030 Shift reassessment completed,patient soundly sleeping, no changes in previous assessment      0430 Shift reassessment done,call bell within reach no signs of distress noted      0705 Patient's glucose level is 36,a critical from lab,    0711 IM  Glucagon given,    0733 patient's sugar level is 45 after recheck  Patient given a box meal    0740 Bedside and Verbal shift change report given to [address] [place] [address] (oncoming nurse) by Madhu Perera  RN (off going nurse).  Report included the following information SBAR, Kardex, [facility] and Recent Results

## 2018-02-27 NOTE — PROGRESS NOTES
Problem: Falls - Risk of  Goal: *Absence of Falls  Document Ernie Fall Risk and appropriate interventions in the flowsheet.    Outcome: Progressing Towards Goal  Fall Risk Interventions:       Mentation Interventions: Adequate sleep, hydration, pain control    Medication Interventions: Patient to call before getting OOB, Teach patient to arise slowly    Elimination Interventions: Call light in reach, Patient to call for help with toileting needs

## 2018-02-27 NOTE — PROGRESS NOTES
0730 Report received from [facility]. Patient received in bed awake and alert x 4 and is able to make all his needs known at this time. Patient is continent of bowel and bladder and use the bedpan, he is noted with a Andujar cath draining ashleigh colored urine. Patient is currently on bed rest, and is unable to ambulate. Patient noted on 3 Liters of oxygen noted with clear lung sounds. Patient is noted with sacral stage I pressure area, and bilateral LE bruising. Plans are to discharge back to [facility] when medically stable. Patient currently c/o zero pain and is noted with zero s/s of distress. Patient came in via EMS, for abnormal lab values and high blood sugar. Patient has a history of abnormal blood sugars dated back to age 13, Patient has a history of fall with traumatic brain injury and has a history of mental illness. He is alert x 4, with a delayed response, he has a history of anxiety with swallowing. Patient educated on medication needs. All medication given crushed in applesauce. K+ level at 2.9, MD Rommel Ramirez notified with new orders for 40 mEq oral K+, patient refused medication. MD Rommel Ramirez notified with new orders to run 10 mEq/100ml NS x 6 runs. Patient is tolerating this well. Bedside and Verbal shift change report given to [facility] (oncoming nurse) by Ben Dominguez RN (offgoing nurse). Report included the following information SBAR, ED Summary, Procedure Summary, Intake/Output, MAR, Recent Results, Med Rec Status and Cardiac Rhythm regular.

## 2018-02-27 NOTE — PROGRESS NOTES
0730 Report received from Hilaria Ricketts. Patient received in bed awake and alert x 4 and is able to make all his needs known at this time. Patient is continent of bowel and bladder and use the bedpan, he is noted with a Andujar cath draining ashleigh colored urine. Patient is currently on bed rest, and is unable to ambulate. Patient noted on 3 Liters of oxygen noted with clear lung sounds. Patient is noted with sacral stage I pressure area, and bilateral LE bruising. Plans are to discharge back to [facility] when medically stable. Patient currently c/o zero pain and is noted with zero s/s of distress.      Patient came in via EMS, for abnormal lab values and high blood sugar. Patient has a history of abnormal blood sugars dated back to age 13, Patient has a history of fall with traumatic brain injury and has a history of mental illness. He is alert x 4, with a delayed response, he has a history of anxiety with swallowing. Patient educated on medication needs. All medication given crushed in applesauce. Patient shows much improvement today, he remains with abnormal lab values and is noted with abnormal blood sugars. MRI department called about his screening. He is on the list to have MRI completed today. Bedside and Verbal shift change report given to Hilaria Ricketts (oncoming nurse) by Deyanira Delarosa RN (offgoing nurse).  Report included the following information SBAR, ER, Procedure summary, MAR.

## 2018-02-27 NOTE — PROGRESS NOTES
IDR Summary      Patient: Paras Duncan MRN: [id number]    Age: 56 y.o.  : 1960     Admit Diagnosis: SILVERIO (acute kidney injury) [facility]      Problems pertinent to hospital stay:     Consults:Speech and Case Management     Testing due for patient today? YES    Nutrition plan:Yes     Mobility needs: Yes      Lines/Tubes:   IV: YES   Needed: YES  Andujar: YES  Needed:YES  Central Line: NO Needed: NO      VTE Prophylaxis: Mechanical    Influenza Vaccine received? NO        Care Management:  Discharge plan: [facility]  PCP: Sergey Fall MD    : NO  Financial concerns:No   Interventions:       LOS: 5 days     Expected days until discharge: TBD- has been accepted to rehab    Signed:      DARLIN Hensley  [facility]  Hospitalist Division  Pager:  [phone]  Office:  [phone]

## 2018-02-27 NOTE — PROGRESS NOTES
Patient is unable to communicate at this time.  offered prayer. Chaplains will continue to follow and will provide pastoral care on an as needed/requested basis.     [address] [facility]   Staff [address] [place] [address]   [phone]

## 2018-02-27 NOTE — PROGRESS NOTES
Medical Progress Note      NAME: Glenn Vargas   :  1960  MRM:  [id number]    Date/Time: 2018  8:32 AM         Subjective:     \"im sleepy but can I eat breakfest?\"    Past Medical History reviewed and unchanged from Admission History and Physical    Review of Systems   Unable to perform ROS: Acuity of condition            Objective:     Poor historian. Noted Serum NA improved with fluids however slighly more elevated today (144 --->147). Renal onboard. Will defer to Renal when ok to stop IVF. K had improved with replacement. MAG is ok. ADH/ACTH pending. FENA urine osmo pending. I&O's noted unlikely DI given output. Cortisol level WNL. MRI pituitary still has not been done. Discussed with staff to encourage PO intake. Vitals:      Last 24hrs VS reviewed since prior progress note. Most recent are:    Visit Vitals    /61 (BP 1 Location: Right arm, BP Patient Position: At rest)    Pulse 78    Temp 98.6 °F (37 °C)    Resp 18    Ht 5' 6\" (1.676 m)    Wt 45.4 kg (100 lb 1.4 oz)    SpO2 99%    BMI 16.15 kg/m2     SpO2 Readings from Last 6 Encounters:   18 99%   18 100%   18 99%   18 99%    O2 Flow Rate (L/min): 3 l/min       Intake/Output Summary (Last 24 hours) at 18 0816  Last data filed at 18 0538   Gross per 24 hour   Intake             1000 ml   Output             1800 ml   Net             -800 ml          Exam:      Physical Exam   Constitutional: No distress. HENT:   Head: Normocephalic. Eyes: Pupils are equal, round, and reactive to light. Cardiovascular: Normal rate. Pulmonary/Chest: Effort normal and breath sounds normal.   Abdominal: Soft. Bowel sounds are normal.   Musculoskeletal: Normal range of motion. Neurological: He is alert. Skin: Skin is warm and dry. He is not diaphoretic.              Medications:  Current Facility-Administered Medications   Medication Dose Route Frequency    0.45% sodium chloride with KCl 20 mEq/L infusion   IntraVENous CONTINUOUS    magnesium oxide (MAG-OX) tablet 400 mg  400 mg Oral BID    influenza vaccine 2017-18 (3 yrs+)(PF) (FLUZONE QUAD/FLUARIX QUAD) injection 0.5 mL  0.5 mL IntraMUSCular PRIOR TO DISCHARGE    insulin glargine (LANTUS) injection 4 Units  4 Units SubCUTAneous QHS    citalopram (CELEXA) tablet 40 mg  40 mg Oral DAILY    midodrine (PROAMITINE) tablet 10 mg  10 mg Oral TID WITH MEALS    modafinil (PROVIGIL) tablet 200 mg  200 mg Oral DAILY    nortriptyline (PAMELOR) capsule 150 mg  150 mg Oral QHS    pramipexole (MIRAPEX) tablet 0.125 mg  0.125 mg Oral TID    acetaminophen (TYLENOL) tablet 650 mg  650 mg Oral Q4H PRN    ondansetron (ZOFRAN) injection 4 mg  4 mg IntraVENous Q4H PRN    insulin lispro (HUMALOG) injection   SubCUTAneous AC&HS    glucose chewable tablet 16 g  4 Tab Oral PRN    glucagon (GLUCAGEN) injection 1 mg  1 mg IntraMUSCular PRN    dextrose (D50W) injection syrg 12.5-25 g  25-50 mL IntraVENous PRN       ______________________________________________________________________      Lab Review:     Recent Labs      02/27/18   0451 02/26/18   0446  02/25/18   0515   WBC  4.6  5.5  9.7   HGB  9.9*  9.7*  11.7*   HCT  30.3*  29.6*  37.5   PLT  105*  112*  191     Recent Labs      02/27/18   0451  02/26/18   0446  02/25/18   0515   NA  147*  144  156*   K  3.8  2.9*  2.9*   CL  108  103  116*   CO2  34*  37*  28   GLU  36*  144*  31*   BUN  10  8  15   CREA  0.39*  0.44*  0.65   CA  8.5  8.2*  8.3*   MG   --   2.2  1.7   ALB   --   1.9*  2.2*   SGOT   --   18  28   ALT   --   17  23     No components found for: GLPOC  No results for input(s): PH, PCO2, PO2, HCO3, FIO2 in the last 72 hours. No results for input(s): INR in the last 72 hours.     No lab exists for component: INREXT, INREXT, INREXT             Assessment:     Principal Problem:    Acute renal failure (ARF) ([facility] (2/14/2018)    Active Problems:    Traumatic brain injury ([facility] (2/3/2018)      IDDM (insulin dependent diabetes mellitus) ([facility] (2/3/2018)      Parkinson's disease ([facility] (2/3/2018)      Psychiatric disorder (2/3/2018)      SILVERIO (acute kidney injury) ([facility] (2/22/2018)           Plan:       Acute Renal Failure'  -resolved  -2/2 dehydration  -IVF for now  -h2jqyga weight and I&O's     Hypovolemic Hypernatermia  -renal onboard  -serial BMP  -IVF for now as per Renal  -do not think DI given output     Hypotension  -2/2 dehydration  -do not suspect infectious etiology  -blood cultures done NGTD  -improved with IVF  -hold abx for now  -midodrine  -UA ok     Deconditioning  -PT/OT     IDDM  -corrective insulin  -basal dosing     Psych disorder/Parkinsons  -mirapex  -celexa     Hx of C-diff  -S/p completion of po vanc  -last negative for c-diff     DVT prophylaxis  -scd/teds  -heparin sq         ___________________________________________________    Attending Physician: Cheikh Richardson NP

## 2018-02-28 ENCOUNTER — APPOINTMENT (OUTPATIENT)
Dept: MRI IMAGING | Age: 58
DRG: 683 | End: 2018-02-28
Attending: HOSPITALIST
Payer: MEDICARE

## 2018-02-28 LAB
ANION GAP SERPL CALC-SCNC: 7 MMOL/L (ref 3–18)
BACTERIA SPEC CULT: NORMAL
BUN SERPL-MCNC: 8 MG/DL (ref 7–18)
BUN/CREAT SERPL: 23 (ref 12–20)
CALCIUM SERPL-MCNC: 8.3 MG/DL (ref 8.5–10.1)
CHLORIDE SERPL-SCNC: 104 MMOL/L (ref 100–108)
CO2 SERPL-SCNC: 32 MMOL/L (ref 21–32)
CREAT SERPL-MCNC: 0.35 MG/DL (ref 0.6–1.3)
ERYTHROCYTE [DISTWIDTH] IN BLOOD BY AUTOMATED COUNT: 15.6 % (ref 11.6–14.5)
GLUCOSE BLD STRIP.AUTO-MCNC: 167 MG/DL (ref 70–110)
GLUCOSE BLD STRIP.AUTO-MCNC: 185 MG/DL (ref 70–110)
GLUCOSE BLD STRIP.AUTO-MCNC: 319 MG/DL (ref 70–110)
GLUCOSE BLD STRIP.AUTO-MCNC: 404 MG/DL (ref 70–110)
GLUCOSE BLD STRIP.AUTO-MCNC: 561 MG/DL (ref 70–110)
GLUCOSE BLD STRIP.AUTO-MCNC: 77 MG/DL (ref 70–110)
GLUCOSE SERPL-MCNC: 28 MG/DL (ref 74–99)
HCT VFR BLD AUTO: 28.8 % (ref 36–48)
HGB BLD-MCNC: 9.3 G/DL (ref 13–16)
MCH RBC QN AUTO: 29.7 PG (ref 24–34)
MCHC RBC AUTO-ENTMCNC: 32.3 G/DL (ref 31–37)
MCV RBC AUTO: 92 FL (ref 74–97)
PLATELET # BLD AUTO: 79 K/UL (ref 135–420)
PMV BLD AUTO: 12.2 FL (ref 9.2–11.8)
POTASSIUM SERPL-SCNC: 3.8 MMOL/L (ref 3.5–5.5)
RBC # BLD AUTO: 3.13 M/UL (ref 4.7–5.5)
SERVICE CMNT-IMP: NORMAL
SODIUM SERPL-SCNC: 143 MMOL/L (ref 136–145)
WBC # BLD AUTO: 5.1 K/UL (ref 4.6–13.2)

## 2018-02-28 PROCEDURE — A9577 INJ MULTIHANCE: HCPCS | Performed by: HOSPITALIST

## 2018-02-28 PROCEDURE — 74011636637 HC RX REV CODE- 636/637: Performed by: HOSPITALIST

## 2018-02-28 PROCEDURE — 97165 OT EVAL LOW COMPLEX 30 MIN: CPT

## 2018-02-28 PROCEDURE — 82962 GLUCOSE BLOOD TEST: CPT

## 2018-02-28 PROCEDURE — 36415 COLL VENOUS BLD VENIPUNCTURE: CPT | Performed by: NURSE PRACTITIONER

## 2018-02-28 PROCEDURE — 77030021566 MRI BRAIN W WO CONT

## 2018-02-28 PROCEDURE — 74011250636 HC RX REV CODE- 250/636: Performed by: FAMILY MEDICINE

## 2018-02-28 PROCEDURE — 74011250636 HC RX REV CODE- 250/636: Performed by: HOSPITALIST

## 2018-02-28 PROCEDURE — 97110 THERAPEUTIC EXERCISES: CPT

## 2018-02-28 PROCEDURE — 65270000029 HC RM PRIVATE

## 2018-02-28 PROCEDURE — 85027 COMPLETE CBC AUTOMATED: CPT | Performed by: NURSE PRACTITIONER

## 2018-02-28 PROCEDURE — 77010033678 HC OXYGEN DAILY

## 2018-02-28 PROCEDURE — 70553 MRI BRAIN STEM W/O & W/DYE: CPT

## 2018-02-28 PROCEDURE — 80048 BASIC METABOLIC PNL TOTAL CA: CPT | Performed by: NURSE PRACTITIONER

## 2018-02-28 PROCEDURE — 74011250637 HC RX REV CODE- 250/637: Performed by: NURSE PRACTITIONER

## 2018-02-28 PROCEDURE — 74011250636 HC RX REV CODE- 250/636: Performed by: NURSE PRACTITIONER

## 2018-02-28 PROCEDURE — 74011636637 HC RX REV CODE- 636/637: Performed by: FAMILY MEDICINE

## 2018-02-28 PROCEDURE — 97162 PT EVAL MOD COMPLEX 30 MIN: CPT

## 2018-02-28 PROCEDURE — 74011250637 HC RX REV CODE- 250/637: Performed by: HOSPITALIST

## 2018-02-28 RX ORDER — MAGNESIUM SULFATE 100 %
16 CRYSTALS MISCELLANEOUS AS NEEDED
Status: DISCONTINUED | OUTPATIENT
Start: 2018-02-28 | End: 2018-02-28 | Stop reason: SDUPTHER

## 2018-02-28 RX ORDER — INSULIN LISPRO 100 [IU]/ML
INJECTION, SOLUTION INTRAVENOUS; SUBCUTANEOUS
Status: DISCONTINUED | OUTPATIENT
Start: 2018-03-01 | End: 2018-03-03 | Stop reason: HOSPADM

## 2018-02-28 RX ORDER — INSULIN LISPRO 100 [IU]/ML
10 INJECTION, SOLUTION INTRAVENOUS; SUBCUTANEOUS ONCE
Status: COMPLETED | OUTPATIENT
Start: 2018-02-28 | End: 2018-02-28

## 2018-02-28 RX ORDER — INSULIN GLARGINE 100 [IU]/ML
5 INJECTION, SOLUTION SUBCUTANEOUS DAILY
Status: DISCONTINUED | OUTPATIENT
Start: 2018-02-28 | End: 2018-03-03 | Stop reason: HOSPADM

## 2018-02-28 RX ORDER — SODIUM CHLORIDE 9 MG/ML
500 INJECTION, SOLUTION INTRAVENOUS ONCE
Status: COMPLETED | OUTPATIENT
Start: 2018-02-28 | End: 2018-03-02

## 2018-02-28 RX ORDER — DEXTROSE 50 % IN WATER (D50W) INTRAVENOUS SYRINGE
25-50 AS NEEDED
Status: DISCONTINUED | OUTPATIENT
Start: 2018-02-28 | End: 2018-02-28 | Stop reason: SDUPTHER

## 2018-02-28 RX ADMIN — PRAMIPEXOLE DIHYDROCHLORIDE 0.12 MG: 0.25 TABLET ORAL at 21:40

## 2018-02-28 RX ADMIN — SODIUM CHLORIDE 500 ML: 900 INJECTION, SOLUTION INTRAVENOUS at 22:13

## 2018-02-28 RX ADMIN — PRAMIPEXOLE DIHYDROCHLORIDE 0.12 MG: 0.25 TABLET ORAL at 17:28

## 2018-02-28 RX ADMIN — INSULIN GLARGINE 5 UNITS: 100 INJECTION, SOLUTION SUBCUTANEOUS at 22:24

## 2018-02-28 RX ADMIN — PRAMIPEXOLE DIHYDROCHLORIDE 0.12 MG: 0.25 TABLET ORAL at 09:18

## 2018-02-28 RX ADMIN — MIDODRINE HYDROCHLORIDE 10 MG: 2.5 TABLET ORAL at 09:19

## 2018-02-28 RX ADMIN — NORTRIPTYLINE HYDROCHLORIDE 150 MG: 25 CAPSULE ORAL at 21:39

## 2018-02-28 RX ADMIN — Medication 400 MG: at 09:19

## 2018-02-28 RX ADMIN — SODIUM CHLORIDE AND POTASSIUM CHLORIDE: 4.5; 1.49 INJECTION, SOLUTION INTRAVENOUS at 21:36

## 2018-02-28 RX ADMIN — CITALOPRAM HYDROBROMIDE 40 MG: 20 TABLET ORAL at 09:18

## 2018-02-28 RX ADMIN — INSULIN LISPRO 10 UNITS: 100 INJECTION, SOLUTION INTRAVENOUS; SUBCUTANEOUS at 22:24

## 2018-02-28 RX ADMIN — Medication 400 MG: at 17:28

## 2018-02-28 RX ADMIN — GLUCAGON HYDROCHLORIDE 1 MG: KIT at 06:00

## 2018-02-28 RX ADMIN — SODIUM CHLORIDE AND POTASSIUM CHLORIDE: 4.5; 1.49 INJECTION, SOLUTION INTRAVENOUS at 02:29

## 2018-02-28 RX ADMIN — GADOBENATE DIMEGLUMINE 10 ML: 529 INJECTION, SOLUTION INTRAVENOUS at 21:46

## 2018-02-28 RX ADMIN — MIDODRINE HYDROCHLORIDE 10 MG: 2.5 TABLET ORAL at 17:28

## 2018-02-28 RX ADMIN — SODIUM CHLORIDE 1250 MG: 900 INJECTION, SOLUTION INTRAVENOUS at 14:11

## 2018-02-28 NOTE — PROGRESS NOTES
1950 Bedside and Verbal shift change report given to Allen Harden Rn (oncoming nurse) by Alfred Beasley RN (off going nurse).  Report included the following information SBAR, Kardex, MAR and Recent Results,non tele      2000 Shift assessment completed,patient is a/o x 3 ,call bell within reach ,bed lowered and locked, no signs of distress noted      2029 due medications given,no adverse reactions observed, well tolerated      0000 Shift reassessment completed,patient soundly sleeping, no changes in previous assessment      0400 Shift reassessment done,call bell within reach no signs of distress noted

## 2018-02-28 NOTE — PROGRESS NOTES
Pharmacy Dosing Services: Vancomycin    Indication: Bloodstream infection    Day of therapy: 0    Other Antimicrobials (Include dose, start day & day of therapy):  None      Loading dose (date given): 1250mg  Current Maintenance dose: Ne start    Goal Vancomycin Level: 15-20  (Trough 15-20 for most infections, 20 for meningitis/osteomyelitis, pre-HD level ~25)    Vancomycin Level (if drawn): None at this time     Significant Cultures:   blood- staph auricularis   blood- no growth    Renal function stable? (unstable defined as SCr increase of 0.5 mg/dL or > 50% increase from baseline, whichever is greater) (Y/N): N     CAPD, Hemodialysis or Renal Replacement Therapy (Y/N): N     Recent Labs      18   0357  18   0451  18   0446   CREA  0.35*  0.39*  0.44*   BUN  8  10  8   WBC  5.1  4.6  5.5     Temp (24hrs), Av.1 °F (36.7 °C), Min:97.5 °F (36.4 °C), Max:99 °F (37.2 °C)    Creatinine Clearance (Creatinine Clearance (ml/min)): >100 ml/min     Regimen assessment: New start; load 1250mg  Maintenance dose: 1000mg Iv q12h  Next scheduled level: 3/2 prior to 0100 dose       Pharmacy will follow daily and adjust medications as appropriate for renal function and/or serum levels.     Thank you,  Cecelia Motta

## 2018-02-28 NOTE — PROGRESS NOTES
RENAL PROGRESS NOTE        Lorenzo Noriega         Assessment/Plan:     · SILVERIO due to volume depletion. Resolved. · Recurrent hypovolemic hypernatremia. Na is improving to normal. Continue to encourage oral intake of free water. Oral intake of food/fluid appears to be adequate. No need to resume ivf. I yesterday again ordered urine osmolality- hasn't been sent yet. Don't suspect DI. · Hypokalemia. Replaced. Subjective:  Patient complaints off: No complaints. Sitting in bed, eating breakfast. No SOB/CP/N/V. States appetite is better. States he is thirsty and is drinking water. Patient Active Problem List   Diagnosis Code    C. difficile colitis A04.72    Traumatic brain injury ([facility] S06. 9X5A    IDDM (insulin dependent diabetes mellitus) ([facility] E11.9, Z79.4    Parkinson's disease ([facility] G20    Psychiatric disorder F99    Acute renal failure (ARF) ([facility] N17.9    SILVERIO (acute kidney injury) ([facility] N17.9       Current Facility-Administered Medications   Medication Dose Route Frequency Provider Last Rate Last Dose    insulin glargine (LANTUS) injection 2 Units  2 Units SubCUTAneous ACD Lucina Samuels MD   2 Units at 02/27/18 1747    0.45% sodium chloride with KCl 20 mEq/L infusion   IntraVENous CONTINUOUS Lucina Samuels MD 50 mL/hr at 02/28/18 0229      magnesium oxide (MAG-OX) tablet 400 mg  400 mg Oral BID Ghanshyam Rausch MD   400 mg at 02/28/18 0919    influenza vaccine 2017-18 (3 yrs+)(PF) (FLUZONE QUAD/FLUARIX QUAD) injection 0.5 mL  0.5 mL IntraMUSCular PRIOR TO DISCHARGE Ghanshyam Rausch MD        citalopram (CELEXA) tablet 40 mg  40 mg Oral DAILY Vearl Call, NP   40 mg at 02/28/18 4715    midodrine (PROAMITINE) tablet 10 mg  10 mg Oral TID WITH MEALS Vearl Call, NP   10 mg at 02/28/18 0919    modafinil (PROVIGIL) tablet 200 mg  200 mg Oral DAILY Vearl Call, NP 200 mg at 02/27/18 1011    nortriptyline (PAMELOR) capsule 150 mg  150 mg Oral QHS Juanpablo Galvez NP   150 mg at 02/27/18 2229    pramipexole (MIRAPEX) tablet 0.125 mg  0.125 mg Oral TID Juanpablo Galvez NP   0.125 mg at 02/28/18 8629    acetaminophen (TYLENOL) tablet 650 mg  650 mg Oral Q4H PRN Juanpablo Galvez NP        ondansetron [facility]) injection 4 mg  4 mg IntraVENous Q4H PRN Juanpablo Galvez NP        insulin lispro (HUMALOG) injection   SubCUTAneous AC&HS Gerald Barboza MD   Stopped at 02/27/18 2229    glucose chewable tablet 16 g  4 Tab Oral PRN Juanpablo Galvez NP   16 g at 02/25/18 0747    glucagon (GLUCAGEN) injection 1 mg  1 mg IntraMUSCular PRN Juanpablo Galvez NP   1 mg at 02/28/18 0600    dextrose (D50W) injection syrg 12.5-25 g  25-50 mL IntraVENous PRN Juanpablo Galvez NP           Objective  Vitals:    02/27/18 2032 02/28/18 0003 02/28/18 0521 02/28/18 0845   BP: 100/76 99/78 100/74 100/77   Pulse: 76  78 77   Resp: 17 18 17 18   Temp: 98 °F (36.7 °C) 99 °F (37.2 °C) 98 °F (36.7 °C) 97.8 °F (36.6 °C)   SpO2: 99% 98% 99% 99%   Weight:       Height:             Intake/Output Summary (Last 24 hours) at 02/28/18 1041  Last data filed at 02/28/18 0659   Gross per 24 hour   Intake             1560 ml   Output             1501 ml   Net               59 ml           Admission weight: Weight: 45.4 kg (100 lb) (02/22/18 1118)  Last Weight Metrics:  Weight Loss Metrics 2/27/2018 2/16/2018 2/8/2018 1/12/2018   Today's Wt 100 lb 1.4 oz 119 lb 14.9 oz 124 lb 116 lb   BMI 16.15 kg/m2 19.36 kg/m2 20.01 kg/m2 18.72 kg/m2             Physical Assessment:     General: NAD, alert and oriented. Neck: No jvd. LUNGS: Clear to Auscultation, No rales, rhonchi or wheezes. CVS EXM: S1, S2  RRR, no murmurs/gallops/rubs. Abdomen: soft, non tender. Lower Extremities:  no edema.        Lab    CBC w/Diff Recent Labs      02/28/18   0357  02/27/18   0451  02/26/18   0446   WBC  5.1  4.6  5.5   RBC  3.13*  3.32*  3.22*   HGB  9.3*  9.9*  9.7*   HCT 28.8*  30.3*  29.6*   PLT  79*  105*  112*        Chemistry Recent Labs      02/28/18   0357  02/27/18   0451  02/26/18   0446   GLU  28*  36*  144*   NA  143  147*  144   K  3.8  3.8  2.9*   CL  104  108  103   CO2  32  34*  37*   BUN  8  10  8   CREA  0.35*  0.39*  0.44*   CA  8.3*  8.5  8.2*   AGAP  7  5  4   BUCR  23*  26*  18   AP   --    --   94   TP   --    --   4.7*   ALB   --    --   1.9*   GLOB   --    --   2.8   AGRAT   --    --   0.7*         No results found for: IRON, FE, TIBC, IBCT, PSAT, FERR   Lab Results   Component Value Date/Time    Calcium 8.3 (L) 02/28/2018 03:57 AM        Maricarmen Shields M.D.   [facility]  Phone

## 2018-02-28 NOTE — PROGRESS NOTES
9647 patient has a critical value of 28 ,im glucagon given,[id number] is a/o x3,no signs of distresss noted patient given a bowl of fruit and crackers given  Will recheck blood sugar in 15 minutes    0618 Blood glucose is 77 at this time ,will continue to monitor    0655 blood sugar is 167 ,patient resting quietly in bed    0740 Bedside and Verbal shift change report given to Li Rader RN (oncoming nurse) by Rahat Parrish RN (off going nurse).  Report included the following information SBAR, Kardex, [facility] and Recent Results

## 2018-02-28 NOTE — PROGRESS NOTES
IDR Summary      Patient: Glenn Vargas MRN: [id number]    Age: 56 y.o.  : 1960     Admit Diagnosis: SILVERIO (acute kidney injury) [facility]      Problems pertinent to hospital stay: r/o C-diff     Consults:Speech and Case Management     Testing due for patient today? YES- awaiting MRI brain    Nutrition plan:Yes     Mobility needs: Yes      Lines/Tubes:   IV: YES   Needed: YES  Andujar: YES  Needed:YES  Central Line: NO Needed: NO      VTE Prophylaxis: Mechanical    Influenza Vaccine received? NO        Care Management:  Discharge plan: [facility]  PCP: Asael Paul MD    : NO  Financial concerns:No   Interventions:       LOS: 6 days     Expected days until discharge: TBD- has been accepted to rehab    Signed:      DARLIN [facility]  Hospitalist Division  Pager:  [phone]  Office:  [phone]

## 2018-02-28 NOTE — PROGRESS NOTES
Problem: Self Care Deficits Care Plan (Adult)  Goal: *Acute Goals and Plan of Care (Insert Text)  Occupational Therapy Goals  Initiated 2/28/2018 within 7 day(s). 1.  Patient will perform grooming with supervision/set-up seated EOB. 2.  Patient will perform upper body dressing with supervision/set-up. 6.  Patient will participate in upper extremity therapeutic exercise/activities with supervision/set-up for 15 minutes. 7.  Patient will utilize energy conservation techniques during functional activities with verbal and visual cues. Occupational Therapy EVALUATION    Patient: Sophia Bridges (15 y.o. male)  Date: 2/28/2018  Primary Diagnosis: SILVERIO (acute kidney injury) ([facility]        Precautions:   Fall, Contact  PLOF: pt reports to be bed bound at SNF and did not participate with BADLs at the facility. ASSESSMENT :  Based on the objective data described below, the patient presents with decreased strength. Pt adamantly refusing to the get OOB and participate with therapy despite providing max verbal cues and encouragement. Pt agreed to participate with bed level exercise for UEs with max verbal cues. Pt reports to be dependent with BADLs at SNF. Patient will benefit from skilled intervention to address the above impairments.   Patients rehabilitation potential is considered to be Fair  Factors which may influence rehabilitation potential include:   []             None noted  [x]             Mental ability/status  []             Medical condition  []             Home/family situation and support systems  []             Safety awareness  []             Pain tolerance/management  []             Other:     Recommendations for nursing:  Written on communication board:   Verbally communicated to:        PLAN :  Recommendations and Planned Interventions:  [x]               Self Care Training                  [x]        Therapeutic Activities  []               Functional Mobility Training    [] Cognitive Retraining  [x]               Therapeutic Exercises           []        Endurance Activities  []               Balance Training                   []        Neuromuscular Re-Education  []               Visual/Perceptual Training     []   Home Safety Training  [x]               Patient Education                 []        Family Training/Education  []               Other (comment):    Frequency/Duration: Patient will be followed by occupational therapy 3 times a week to address goals. Discharge Recommendations: Skilled Nursing Facility  Further Equipment Recommendations for Discharge: N/A     SUBJECTIVE:   Patient stated i am not getting out of bed. I stay in bed at the nursing home all the time and I don't walk there.     OBJECTIVE DATA SUMMARY:     Past Medical History:   Diagnosis Date    Anxiety     Diabetes ([facility]     Ill-defined condition     hypotension    Psychiatric disorder     depression   History reviewed. No pertinent surgical history. Barriers to Learning/Limitations: yes;  cognitive and altered mental status (i.e.Sedation, Confusion)  Compensate with: visual, verbal, tactile, kinesthetic cues/model    GCODES:  Self Care  Current  CM= 80-99%   Goal  CJ= 20-39%. The severity rating is based on the Other functional mobility and ADLs participation. Eval Complexity: History: MEDIUM Complexity : Expanded review of history including physical, cognitive and psychosocial  history ; Examination: MEDIUM Complexity : 3-5 performance deficits relating to physical, cognitive , or psychosocial skils that result in activity limitations and / or participation restrictions; Decision Making:MEDIUM Complexity : Patient may present with comorbidities that affect occupational performnce.  Miniml to moderate modification of tasks or assistance (eg, physical or verbal ) with assesment(s) is necessary to enable patient to complete evaluation     Prior Level of Function/Home Situation:   Home Situation  Home Environment: [address] [place] [address] Name: Mandi ruano  One/Two Story Residence: Other (Comment)  Living Alone: No  Support Systems: Skilled nursing facility  Patient Expects to be Discharged to[de-identified] Skilled nursing facility  Current DME Used/Available at Home: Other (comment)  [x]  Right hand dominant   []  Left hand dominant  Cognitive/Behavioral Status:  Neurologic State: Alert  Orientation Level: Oriented X4  Cognition: Decreased command following  Safety/Judgement: Fall prevention  Skin: intact  Edema: none  Vision/Perceptual:    Tracking: Able to track stimulus in all quadrants w/o difficulty    Acuity: Able to read clock/calendar on wall without difficulty; Able to read employee name badge without difficulty    Coordination:  Fine Motor Skills-Upper: Left Intact; Right Intact    Gross Motor Skills-Upper: Left Intact; Right Intact  Balance:  Sitting:  (unable to assess; pt refused to get OOB)  Standing:  (unable to assess; pt refused to get OOB)  Strength:  Strength: Generally decreased, functional  Tone & Sensation:  Tone: Normal  Sensation: Intact  Range of Motion:  AROM: Within functional limits  Functional Mobility and Transfers for ADLs:  Bed Mobility:  Rolling: Supervision  Supine to Sit:  (refused)  Sit to Supine:  (refused)  Transfers:  Sit to Stand:  (unable to assess, pt refused to get OOB)  ADL Assessment:  Feeding: Setup    Oral Facial Hygiene/Grooming: Minimum assistance    Bathing: Maximum assistance    Upper Body Dressing: Moderate assistance    Lower Body Dressing: Maximum assistance    Toileting: Maximum assistance  ADL Intervention:  Cognitive Retraining  Safety/Judgement: Fall prevention    Therapeutic Exercise:  BUE AROM exercises at bed level, 2x10 for shoulder flexion, abduction, scapular elevation/depression and scapular protraction/retraction.      Pain:  Pre treatment pain level:    Post treatment pain level:   Pain Scale 1: Numeric (0 - 10)  Pain Intensity 1: 0  Activity Tolerance:   Fair     Please refer to the flowsheet for vital signs taken during this treatment. After treatment:   [] Patient left in no apparent distress sitting up in chair  [x] Patient left in no apparent distress in bed  [x] Call bell left within reach  [] Nursing notified  [] Caregiver present  [] Bed alarm activated    COMMUNICATION/EDUCATION:   [x] Home safety education was provided and the patient/caregiver indicated understanding. [x] Patient/family have participated as able in goal setting and plan of care. [x] Patient/family agree to work toward stated goals and plan of care. [] Patient understands intent and goals of therapy, but is neutral about his/her participation. [] Patient is unable to participate in goal setting and plan of care.     Thank you for this referral.  Dang Malin, OTR  Time Calculation: 20 mins

## 2018-02-28 NOTE — ANCILLARY DISCHARGE INSTRUCTIONS
Patient and/or next of kin has been given the [facility] Important Message From Medicare About Your Rights\" letter and all questions were answered.

## 2018-02-28 NOTE — PROGRESS NOTES
Problem: Mobility Impaired (Adult and Pediatric)  Goal: *Acute Goals and Plan of Care (Insert Text)  Physical Therapy Goals  Initiated 2/28/2018 and to be accomplished within 3 day trial.   If appropriate continue per plan of care to accomplish goals within 7 days. 1.  Patient will maintain eyes open for greater than 90% of session to allow for active participation in mobility training. 2.  Patient will follow 90% of commands to maximize safety and active participation in mobility training. 3.  Patient will move from supine to sit and sit to supine in bed with minimal assistance/contact guard assist..     4.  Patient will maintain seated at edge of bed for 8 min with supervision/set-up to prepare for out of bed activity. 5.  Patient will perform sit to stand with minimal assistance/contact guard assist.  6.  Patient will transfer from bed to chair and chair to bed with minimal assistance/contact guard assist using the least restrictive device. 7.  Patient will ambulate with minimal assistance 25 feet with least restrictive device. Outcome: Progressing Towards Goal  physical Therapy EVALUATION    Patient: Rosanne Montenegro (56 y.o. male)  Date: 2/28/2018  Primary Diagnosis: SILVERIO (acute kidney injury) ([facility]  Precautions: Skin    ASSESSMENT :  Refusing OOB mobility or sitting EOB d/t weakness. Educated on role of PT, benefits of mobility, offered assistance, and suggested second person assistance with OT co-treat; continued to refuse. Agreeable to bed level PT evaluation. Follows commands. Oriented to person, place, and time. Supervision for rolling. BLE AROM WFL. BLE strength 3+/5 per bed level testing. Bilateral heels intact. Per patient, unable to amb since December. Per chart review, admitted from skilled nursing facility; unsure level of mobility at facility. Will follow for 3 day trial to better determine active participation in skilled treatment and prior level of function.  If appropriate, continue per plan of care 3-5x/week to address goals. Patient presents with deficits in:  Bed Mobility, Transfers, Gait, Strength and Balance    Patient will benefit from skilled intervention to address the above impairments. Patients rehabilitation potential is considered to be Guarded  Factors which may influence rehabilitation potential include:   []         None noted  []         Mental ability/status  [x]         Medical condition  []         Home/family situation and support systems  []         Safety awareness  []         Pain tolerance/management  []         Other:      PLAN :  Recommendations and Planned Interventions:  [x]           Bed Mobility Training             [x]    Neuromuscular Re-Education  [x]           Transfer Training                   []    Orthotic/Prosthetic Training  [x]           Gait Training                          []    Modalities  [x]           Therapeutic Exercises          []    Edema Management/Control  [x]           Therapeutic Activities            [x]    Patient and Family Training/Education  []           Other (comment):    Frequency/Duration: Patient will follow for 3 day trial to better determine active participation in skilled treatment and prior level of function. If appropriate, continue per plan of care 3-5x/week to address goals. Discharge Recommendations: Skilled Nursing Facility  Further Equipment Recommendations for Discharge: TBD with OOB     SUBJECTIVE:   Patient stated I can't move.     OBJECTIVE DATA SUMMARY:     Past Medical History:   Diagnosis Date    Anxiety     Diabetes ([facility] [address]     Ill-defined condition     hypotension    Psychiatric disorder     depression   History reviewed. No pertinent surgical history. Barriers to Learning/Limitations: yes;  cognitive and physical  Compensate with: visual, verbal, tactile, kinesthetic cues/model    G CODES:Mobility  Current  CL= 60-79%   Goal  CJ= 20-39%.   The severity rating is based on the Other clinical judgement      Eval Complexity: History: MEDIUM  Complexity : 1-2 comorbidities / personal factors will impact the outcome/ POC Exam:MEDIUM Complexity : 3 Standardized tests and measures addressing body structure, function, activity limitation and / or participation in recreation  Presentation: MEDIUM Complexity : Evolving with changing characteristics  Clinical Decision Making:Medium Complexity clinical judgement Overall Complexity:MEDIUM    Prior Level of Function/Home Situation: Unknown amb status. Home Situation  Home Environment: [address] [place] [address] Name: kaleb ruano  One/Two Story Residence: Other (Comment)  Living Alone: No  Support Systems: Skilled nursing facility  Patient Expects to be Discharged to[de-identified] Assisted living  Current DME Used/Available at Home: Other (comment)  Critical Behavior:  Neurologic State: Eyes open to voice  Orientation Level: Oriented X4  Cognition: Follows commands  Safety/Judgement: Awareness of environment; Fall prevention  Psychosocial  Patient Behaviors: Calm; Uncooperative  Strength:    Strength: Generally decreased, functional (BLE 3+/5; bed level)  Manual Muscle Testing (LE)         R     L    Hip Flexion:   3+/5  3+/5    Knee EXT:   3+/5  3+/5  Knee FLEX:   3+/5  3+/5      Ankle DF:   3+/5  3+/5  _________________________________________________   Tone & Sensation:   Tone: Normal  Sensation: Intact  Range Of Motion:  AROM: Within functional limits (BLE)  Functional Mobility:  Bed Mobility:  Rolling: Supervision  Supine to Sit:  (refused)  Sit to Supine:  (refused)  Transfers:  Sit to Stand:  (refused)  Stand to Sit:  (refused)  Balance:   Sitting:  (refused)  Standing:  (refused )  Ambulation/Gait Training:  Gait Description (WDL):  (refused)  Therapeutic Exercises:   BLE AROM  Rolling  Pain:  Pre treatment pain level: 0  Post treatment pain level: 0  Pain Scale 1: Numeric (0 - 10)  Pain Intensity 1: 0  Activity Tolerance:   Poor  Please refer to the flowsheet for vital signs taken during this treatment. After treatment:   []         Patient left in no apparent distress sitting up in chair  [x]         Patient left in no apparent distress in bed  [x]         Call bell left within reach  [x]         Nursing notified  []         Caregiver present  []         Bed alarm activated    COMMUNICATION/EDUCATION:   [x]         Fall prevention education was provided and the patient/caregiver indicated understanding. [x]         Patient/family have participated as able in goal setting and plan of care. [x]         Patient/family agree to work toward stated goals and plan of care. []         Patient understands intent and goals of therapy, but is neutral about his/her participation. []         Patient is unable to participate in goal setting and plan of care. Patient educated on the role of physical therapy during the acute stay  and the importance of mobility. JH.       Thank you for this referral.  Lauren Martin, PT   Time Calculation: 8 mins

## 2018-02-28 NOTE — PROGRESS NOTES
Progress Note      Patient: Eve Bragg               Sex: male          DOA: 2/22/2018       YOB: 1960      Age:  56 y.o.        LOS:  LOS: 6 days               Subjective:   Pt was positive for c diff in his last admission on 2/3/18 he was put on vancomycin on dc and was supposed jai be on po vancomycin for at least 2 weeks . however on this admission he was taken off the vancomycin . Will reculture the pt 's stool for c diff toxin . The pt's glucose and fluid control is very difficult to manage . He has had brain trauma . He probably needs an mri of the brain and the pituitary       Objective:      Visit Vitals    /77 (BP 1 Location: Right arm, BP Patient Position: At rest)    Pulse 77    Temp 97.8 °F (36.6 °C)    Resp 18    Ht 5' 6\" (1.676 m)    Wt 45.4 kg (100 lb 1.4 oz)    SpO2 99%    BMI 16.15 kg/m2       Physical Exam:  Pt is drowsy .    Heart reg rate and rhythm   Lungs good breath sounds heard   Abdomen soft and nontender   Neuro dementia ,parkinson's disease         Lab/Data Reviewed:  BMP:   Lab Results   Component Value Date/Time     02/28/2018 03:57 AM    K 3.8 02/28/2018 03:57 AM     02/28/2018 03:57 AM    CO2 32 02/28/2018 03:57 AM    AGAP 7 02/28/2018 03:57 AM    GLU 28 (LL) 02/28/2018 03:57 AM    BUN 8 02/28/2018 03:57 AM    CREA 0.35 (L) 02/28/2018 03:57 AM    GFRAA >60 02/28/2018 03:57 AM    GFRNA >60 02/28/2018 03:57 AM     CMP:   Lab Results   Component Value Date/Time     02/28/2018 03:57 AM    K 3.8 02/28/2018 03:57 AM     02/28/2018 03:57 AM    CO2 32 02/28/2018 03:57 AM    AGAP 7 02/28/2018 03:57 AM    GLU 28 (LL) 02/28/2018 03:57 AM    BUN 8 02/28/2018 03:57 AM    CREA 0.35 (L) 02/28/2018 03:57 AM    GFRAA >60 02/28/2018 03:57 AM    GFRNA >60 02/28/2018 03:57 AM    CA 8.3 (L) 02/28/2018 03:57 AM     CBC:   Lab Results   Component Value Date/Time    WBC 5.1 02/28/2018 03:57 AM    HGB 9.3 (L) 02/28/2018 03:57 AM    HCT 28.8 (L) 02/28/2018 03:57 AM    PLT 79 (L) 02/28/2018 03:57 AM           Assessment/Plan     Principal Problem:  Poor control of blood glucose as well as water intake . R/o effect of brain injury r/o pituitary injury . Active Problems:    Traumatic brain injury ([facility] [address] (2/3/2018)      IDDM (insulin dependent diabetes mellitus) ([facility] [address] (2/3/2018)      Parkinson's disease ([facility] [address] (2/3/2018)      Psychiatric disorder (2/3/2018)      SILVERIO (acute kidney injury) ([facility] [address] (2/22/2018)  dementia       Plan: pt will need an mri of the head  to begin .

## 2018-02-28 NOTE — PROGRESS NOTES
1045: Spoke with Dr. Yandy Beckford. D/c'ed the humalog and lantus insulin orders as patient is going critically low with blood sugars after administering. Will continue to check blood sugars AC and HS.     1215: Patient refused both the modafinil and the midodrine. He states that he will not taking anything right now, and will let me know if he wants to take his medications later.

## 2018-02-28 NOTE — MANAGEMENT PLAN
Discharge Planning    Discharge/Transition Plan remains SNF rehab and transition to [facility] at [address] RN BSN  Outcomes Manager  Pager # [phone]

## 2018-03-01 PROBLEM — R53.81 DEBILITY: Status: ACTIVE | Noted: 2018-03-01

## 2018-03-01 PROBLEM — Z71.89 ADVANCED CARE PLANNING/COUNSELING DISCUSSION: Status: ACTIVE | Noted: 2018-03-01

## 2018-03-01 LAB
ALBUMIN SERPL-MCNC: 1.8 G/DL (ref 3.4–5)
ALBUMIN/GLOB SERPL: 0.6 {RATIO} (ref 0.8–1.7)
ALP SERPL-CCNC: 100 U/L (ref 45–117)
ALT SERPL-CCNC: 17 U/L (ref 16–61)
ANION GAP SERPL CALC-SCNC: 14 MMOL/L (ref 3–18)
AST SERPL-CCNC: 11 U/L (ref 15–37)
BASOPHILS # BLD: 0 K/UL (ref 0–0.06)
BASOPHILS NFR BLD: 1 % (ref 0–2)
BILIRUB SERPL-MCNC: 0.4 MG/DL (ref 0.2–1)
BUN SERPL-MCNC: 20 MG/DL (ref 7–18)
BUN/CREAT SERPL: 24 (ref 12–20)
CALCIUM SERPL-MCNC: 8.5 MG/DL (ref 8.5–10.1)
CHLORIDE SERPL-SCNC: 102 MMOL/L (ref 100–108)
CO2 SERPL-SCNC: 26 MMOL/L (ref 21–32)
CREAT SERPL-MCNC: 0.85 MG/DL (ref 0.6–1.3)
DIFFERENTIAL METHOD BLD: ABNORMAL
EOSINOPHIL # BLD: 0 K/UL (ref 0–0.4)
EOSINOPHIL NFR BLD: 0 % (ref 0–5)
ERYTHROCYTE [DISTWIDTH] IN BLOOD BY AUTOMATED COUNT: 16 % (ref 11.6–14.5)
GLOBULIN SER CALC-MCNC: 2.9 G/DL (ref 2–4)
GLUCOSE BLD STRIP.AUTO-MCNC: 114 MG/DL (ref 70–110)
GLUCOSE BLD STRIP.AUTO-MCNC: 124 MG/DL (ref 70–110)
GLUCOSE BLD STRIP.AUTO-MCNC: 132 MG/DL (ref 70–110)
GLUCOSE BLD STRIP.AUTO-MCNC: 197 MG/DL (ref 70–110)
GLUCOSE BLD STRIP.AUTO-MCNC: 223 MG/DL (ref 70–110)
GLUCOSE BLD STRIP.AUTO-MCNC: 446 MG/DL (ref 70–110)
GLUCOSE SERPL-MCNC: 235 MG/DL (ref 74–99)
HCT VFR BLD AUTO: 29.5 % (ref 36–48)
HGB BLD-MCNC: 9.5 G/DL (ref 13–16)
LYMPHOCYTES # BLD: 1.6 K/UL (ref 0.9–3.6)
LYMPHOCYTES NFR BLD: 19 % (ref 21–52)
MAGNESIUM SERPL-MCNC: 2.4 MG/DL (ref 1.6–2.6)
MCH RBC QN AUTO: 30.4 PG (ref 24–34)
MCHC RBC AUTO-ENTMCNC: 32.2 G/DL (ref 31–37)
MCV RBC AUTO: 94.2 FL (ref 74–97)
MONOCYTES # BLD: 0.5 K/UL (ref 0.05–1.2)
MONOCYTES NFR BLD: 6 % (ref 3–10)
NEUTS SEG # BLD: 6.3 K/UL (ref 1.8–8)
NEUTS SEG NFR BLD: 74 % (ref 40–73)
OSMOLALITY SERPL: 288 MOSMOL/KG (ref 275–295)
OSMOLALITY UR: 766 MOSM/KG H2O (ref 300–900)
PLATELET # BLD AUTO: 165 K/UL (ref 135–420)
PMV BLD AUTO: 11.4 FL (ref 9.2–11.8)
POTASSIUM SERPL-SCNC: 4.3 MMOL/L (ref 3.5–5.5)
POTASSIUM UR-SCNC: 57 MMOL/L (ref 12–62)
PROT SERPL-MCNC: 4.7 G/DL (ref 6.4–8.2)
RBC # BLD AUTO: 3.13 M/UL (ref 4.7–5.5)
SODIUM SERPL-SCNC: 142 MMOL/L (ref 136–145)
SODIUM UR-SCNC: 152 MMOL/L (ref 20–110)
VASOPRESSIN SERPL-MCNC: 0.9 PG/ML (ref 0–4.7)
WBC # BLD AUTO: 8.4 K/UL (ref 4.6–13.2)

## 2018-03-01 PROCEDURE — 77030018719 HC DRSG PTCH ANTIMIC J&J -A

## 2018-03-01 PROCEDURE — 84300 ASSAY OF URINE SODIUM: CPT | Performed by: INTERNAL MEDICINE

## 2018-03-01 PROCEDURE — 74011250636 HC RX REV CODE- 250/636: Performed by: HOSPITALIST

## 2018-03-01 PROCEDURE — 80053 COMPREHEN METABOLIC PANEL: CPT | Performed by: HOSPITALIST

## 2018-03-01 PROCEDURE — 74011250637 HC RX REV CODE- 250/637: Performed by: HOSPITALIST

## 2018-03-01 PROCEDURE — 36415 COLL VENOUS BLD VENIPUNCTURE: CPT | Performed by: HOSPITALIST

## 2018-03-01 PROCEDURE — 76450000000

## 2018-03-01 PROCEDURE — 77030020847 HC STATLOK BARD -A

## 2018-03-01 PROCEDURE — 84133 ASSAY OF URINE POTASSIUM: CPT | Performed by: INTERNAL MEDICINE

## 2018-03-01 PROCEDURE — 74011250637 HC RX REV CODE- 250/637: Performed by: NURSE PRACTITIONER

## 2018-03-01 PROCEDURE — 85025 COMPLETE CBC W/AUTO DIFF WBC: CPT | Performed by: HOSPITALIST

## 2018-03-01 PROCEDURE — 65270000029 HC RM PRIVATE

## 2018-03-01 PROCEDURE — 83935 ASSAY OF URINE OSMOLALITY: CPT | Performed by: INTERNAL MEDICINE

## 2018-03-01 PROCEDURE — 82962 GLUCOSE BLOOD TEST: CPT

## 2018-03-01 PROCEDURE — 77010033678 HC OXYGEN DAILY

## 2018-03-01 PROCEDURE — 74011636637 HC RX REV CODE- 636/637: Performed by: FAMILY MEDICINE

## 2018-03-01 PROCEDURE — 83735 ASSAY OF MAGNESIUM: CPT | Performed by: HOSPITALIST

## 2018-03-01 PROCEDURE — 74011250636 HC RX REV CODE- 250/636: Performed by: FAMILY MEDICINE

## 2018-03-01 RX ORDER — FLUDROCORTISONE ACETATE 0.1 MG/1
100 TABLET ORAL DAILY
Status: DISCONTINUED | OUTPATIENT
Start: 2018-03-01 | End: 2018-03-03 | Stop reason: HOSPADM

## 2018-03-01 RX ORDER — SODIUM CHLORIDE 9 MG/ML
500 INJECTION, SOLUTION INTRAVENOUS ONCE
Status: COMPLETED | OUTPATIENT
Start: 2018-03-01 | End: 2018-03-02

## 2018-03-01 RX ORDER — INSULIN LISPRO 100 [IU]/ML
10 INJECTION, SOLUTION INTRAVENOUS; SUBCUTANEOUS ONCE
Status: COMPLETED | OUTPATIENT
Start: 2018-03-01 | End: 2018-03-01

## 2018-03-01 RX ADMIN — Medication 400 MG: at 08:51

## 2018-03-01 RX ADMIN — INSULIN LISPRO 10 UNITS: 100 INJECTION, SOLUTION INTRAVENOUS; SUBCUTANEOUS at 02:26

## 2018-03-01 RX ADMIN — FLUDROCORTISONE ACETATE 100 MCG: 0.1 TABLET ORAL at 14:13

## 2018-03-01 RX ADMIN — MIDODRINE HYDROCHLORIDE 10 MG: 2.5 TABLET ORAL at 08:52

## 2018-03-01 RX ADMIN — SODIUM CHLORIDE 1000 MG: 900 INJECTION, SOLUTION INTRAVENOUS at 01:23

## 2018-03-01 RX ADMIN — PRAMIPEXOLE DIHYDROCHLORIDE 0.12 MG: 0.25 TABLET ORAL at 22:27

## 2018-03-01 RX ADMIN — PRAMIPEXOLE DIHYDROCHLORIDE 0.12 MG: 0.25 TABLET ORAL at 18:17

## 2018-03-01 RX ADMIN — Medication 400 MG: at 18:17

## 2018-03-01 RX ADMIN — MODAFINIL 200 MG: 100 TABLET ORAL at 08:51

## 2018-03-01 RX ADMIN — NORTRIPTYLINE HYDROCHLORIDE 150 MG: 25 CAPSULE ORAL at 22:27

## 2018-03-01 RX ADMIN — MIDODRINE HYDROCHLORIDE 10 MG: 2.5 TABLET ORAL at 18:17

## 2018-03-01 RX ADMIN — SODIUM CHLORIDE 1000 MG: 900 INJECTION, SOLUTION INTRAVENOUS at 13:00

## 2018-03-01 RX ADMIN — SODIUM CHLORIDE 500 ML: 900 INJECTION, SOLUTION INTRAVENOUS at 06:03

## 2018-03-01 RX ADMIN — SODIUM CHLORIDE AND POTASSIUM CHLORIDE: 4.5; 1.49 INJECTION, SOLUTION INTRAVENOUS at 14:13

## 2018-03-01 RX ADMIN — CITALOPRAM HYDROBROMIDE 40 MG: 20 TABLET ORAL at 08:52

## 2018-03-01 RX ADMIN — PRAMIPEXOLE DIHYDROCHLORIDE 0.12 MG: 0.25 TABLET ORAL at 08:49

## 2018-03-01 NOTE — PROGRESS NOTES
IDR Summary      Patient: Tosha Crouch MRN: [id number]    Age: 56 y.o.  : 1960     Admit Diagnosis: SILVERIO (acute kidney injury) [facility]      Problems pertinent to hospital stay: r/o C-diff - family states he was taken off isolation at [facility]- when first admitted he didn't have BM- was placed on isolation on  but no BM in 2 days    Consults:Speech and Case Management     Testing due for patient today? NO    Nutrition plan:Yes     Mobility needs: Yes      Lines/Tubes:   IV: YES   Needed: YES  Andujar: YES  Needed:NO  Central Line: NO Needed: NO      VTE Prophylaxis: Mechanical    Influenza Vaccine received? NO        Care Management:  Discharge plan: [facility]  PCP: Edwin Goldstein MD    : NO  Financial concerns:No   Interventions:       LOS: 7 days     Expected days until discharge: TBD- has been accepted to rehab    Signed:      Kaylee Lucie, NP-C  [facility]  Hospitalist Division  Pager:  [phone]  Office:  [phone]

## 2018-03-01 NOTE — PROGRESS NOTES
Assumed care. Off floor for testing. 2058: Pt hypotensive. Asymptomatic. Noted critical POCT noted not addressed. DR Vera. 2100: See flowsheet for manual BP. Dr Netta Girmaldo paged. 2115: No return call. Dr Vega San Jon repaged. 2130: No return call. Nursing supervisor notified. 2200: Dr Richard Harry notified of patient labs and vitals. See mar for intervention. 3/1/90026    1. Recheck POCT testing. Dr Sivlia Rivera notified,. Dose with 10 units.

## 2018-03-01 NOTE — ROUTINE PROCESS
1130 Specimen collected for urine analysis and sent to the lab. 1250 Patient refused to take medicine. Will try later. 1400 Brother at bedside. Meds given. 1700 Andujar catheter removed. Patient instructed to drink a lot of fluids. 1845 Midline dressing changed done.

## 2018-03-01 NOTE — PROGRESS NOTES
Problem: Falls - Risk of  Goal: *Absence of Falls  Document Ernie Fall Risk and appropriate interventions in the flowsheet.    Outcome: Progressing Towards Goal  Fall Risk Interventions:       Mentation Interventions: Door open when patient unattended    Medication Interventions: Patient to call before getting OOB    Elimination Interventions: Call light in reach, Patient to call for help with toileting needs

## 2018-03-01 NOTE — PROGRESS NOTES
0920: 1st PT attempt. Palliative in room with patient. Will follow up. 1013: 2nd PT attempt. Refuses mobility with PT. Drowsy; falling asleep during conversation with PT. Will follow up.      Thank you,     Chandrakant Rocha, PT, DPT

## 2018-03-01 NOTE — ROUTINE PROCESS
Bedside and Verbal shift change report given to Trevon Zuniga (oncoming nurse) by Artist TREVON Guerin (offgoing nurse). Report included the following information SBAR, Kardex, Intake/Output and MAR.

## 2018-03-01 NOTE — PROGRESS NOTES
OT attempted, pt refusing, reporting dependent w/BADLs. Questioned if pt wanted to pursue OT intervention, stating \"no\". Will d/c OT skilled services at this time.

## 2018-03-01 NOTE — PROGRESS NOTES
Problem: Falls - Risk of  Goal: *Absence of Falls  Document Ernie Fall Risk and appropriate interventions in the flowsheet.    Outcome: Progressing Towards Goal  Fall Risk Interventions:       Mentation Interventions: Adequate sleep, hydration, pain control, Bed/chair exit alarm, Door open when patient unattended, More frequent rounding, Reorient patient, Toileting rounds, Update white board    Medication Interventions: Patient to call before getting OOB    Elimination Interventions: Call light in reach, Patient to call for help with toileting needs

## 2018-03-01 NOTE — PROGRESS NOTES
NUTRITION follow-up/Plan of care    RECOMMENDATIONS:     1. Diabetic Consistent Carb diet  2. SF CIB BID  3. If tube feeding decided on suggest Osmolite 1.2 francois, initial rate of 10 ml/hr increasing by 10 ml/hr every 6 hours to goal rate of 65 ml/hr to provide 1872 calories, 87 gram protein, ~ 1.3 L free water/d (from tube feeding). 4. Monitor weight, labs and PO intake  5. RD to follow    GOALS:     1. Ongoing: PO intake meets >75% of protein/calorie needs by 3/4  2. Ongoing: Promote gradual weight gain (1-2 lb by 3/8)    ASSESSMENT:      Weight status is classified as underweight per BMI of 18.7. Unintentional weight loss due to decreased appetite as evidenced by documented weight loss of 16 lbs (14%) in past 1.5 months. Pt also with reported 35 lb weight loss (33%) in past 6 months and 65 lb weight loss (36%) in past 1.5 years. PO intake is poor. Revised dietary supplements to SF CIB BID for additional calories/protein. Labs noted. Nutrition recommendations listed. RD to follow. Meets Criteria for Chronic Malnutrition   [x] Severe Malnutrition, as evidenced by:                        [x] Severe muscle wasting, loss of subcutaneous fat                        [x] Nutritional intake of <75% of recommended intake for >1 month                        [x] Weight loss of  >5% in 1 month, >7.5% in 3 months, >10% in 6 months, >20% in 1 year                        [] Severe edema      Nutrition Risk:  [x]   High []  Moderate [] Low    SUBJECTIVE/OBJECTIVE:     Patient admitted with ARF. PMHx includes T1DM, traumatic brain injury, Parkinson's disease, psychiatric disorder, recent C diff colitis. Patient reports minimal intake of lunch meal and doesn't want to drink Ensure Enlive supplements because they have too much sugar. Will try SF CIB supplements to help meet nutrition needs. Palliative care meeting with family today. Will monitor.     Information Obtained From:   [x] Chart Review  [x] Patient  [x] Family/Caregiver  [] Nurse/Physician   [] Patient Rounds/Interdisciplinary Meeting    Diet: Diabetic Consistent Carb diet  Patient Vitals for the past 100 hrs:   % Diet Eaten   03/01/18 1057 25 %   02/28/18 1842 25 %   02/28/18 1042 50 %   02/27/18 1843 5 %   02/27/18 1423 10 %   02/27/18 1011 75 %   02/26/18 1300 75 %   02/26/18 0850 100 %   02/25/18 1720 30 %   02/25/18 1000 100 %     Medications: [x] Reviewed   Encounter Diagnoses     ICD-10-CM ICD-9-CM   1. Acute renal failure, unspecified acute renal failure type ([facility] N17.9 584.9   2. Hypernatremia E87.0 276.0   3.  Dehydration E86.0 276.51     Past Medical History:   Diagnosis Date    Anxiety     Diabetes ([facility]     Ill-defined condition     hypotension    Psychiatric disorder     depression     Labs:  Lab Results   Component Value Date/Time    Sodium 142 03/01/2018 04:26 AM    Potassium 4.3 03/01/2018 04:26 AM    Chloride 102 03/01/2018 04:26 AM    CO2 26 03/01/2018 04:26 AM    Anion gap 14 03/01/2018 04:26 AM    Glucose 235 (H) 03/01/2018 04:26 AM    BUN 20 (H) 03/01/2018 04:26 AM    Creatinine 0.85 03/01/2018 04:26 AM    Calcium 8.5 03/01/2018 04:26 AM    Magnesium 2.4 03/01/2018 04:26 AM    Albumin 1.8 (L) 03/01/2018 04:26 AM     Anthropometrics: BMI (calculated): 18.7 Last 3 Recorded Weights in this Encounter    02/26/18 0550 02/27/18 0538 03/01/18 0644   Weight: 45.4 kg (100 lb 1.4 oz) 45.4 kg (100 lb 1.4 oz) 52.6 kg (115 lb 14.4 oz)    Ht Readings from Last 1 Encounters:   02/23/18 5' 6\" (1.676 m)     [x]  Weight Loss (14% x 1.5 mth)  []  Weight Gain  []  Weight Stable   []  New wt n/a on record     Estimated Nutrition Needs:   5113 Kcals/day  Protein (g): 68 g    Nutrition Problems Identified:   [x] Suboptimal PO intake   [] Food Allergies  [] Difficulty chewing/swallowing/poor dentition  [] Constipation/Diarrhea   [] Nausea/Vomiting   [] None  [] Other:     Plan:   [x] Therapeutic Diet  [x]  Obtained/adjusted food preferences/tolerances and/or snacks options   [x]  Dietary supplements (change to SF CIB BID)   [x]  SLP following for feeding techniques  []  HS snack added   []  Modify diet texture   []  Modify diet for food allergies   []  Assist with menu selection   [x]  Monitor PO intake on meal rounds   [x]  Continue inpatient monitoring and intervention   []  Participated in discharge planning/Interdisciplinary rounds/Team meetings   []  Other:     Education Needs:   [] Not appropriate for teaching at this time due to:   [x] Identified and addressed    Nutrition Monitoring and Evaluation:   [x] Continue inpatient monitoring and interventions    [] Other:     J Luis Glaser

## 2018-03-01 NOTE — PROGRESS NOTES
[facility]: [phone]  [facility] [phone]   [facility]: [phone]    Patient Name: Sylvia Oliveira  YOB: 1960    Date of Initial Consult: 3/1/2018   Reason for Consult: care decisions  Requesting Provider: Dr. Jose Rice  Primary Care Physician: Julissa Palacios MD      SUMMARY:   Sylvia Oliveira is a 56y.o. year old with a past history of type 1 DM, mental illness ( manic/ depression) ECT in past, fall August of 2017 with resulting brain bleed, s/p chloe hole and treatment at [facility] who was admitted on 2/22/2018 from [facility] with a diagnosis of acute renal failure, dehydration, hypernatremia . Current medical issues leading to Palliative Medicine involvement include: 56year old male with past h/o mental illness, recent fall with resulting brain bleed, now with poor po, frequent admissions for dehydration, ARF, hypernatremia. Palliative medicine is consulted to discuss care options including feeding tube. PALLIATIVE DIAGNOSES:   1. Advanced care plan discussions  2. Dehydration /ARF  3. Debility        PLAN:   1. Patient seen at bedside. Alert, answered some of my questions. Knew he was in the hospital. His brother Madeline Just later joined at the bedside. Social , no children, mother living although elderly, 2 brothers, Madeline Just local and involved, one brother not involved in care. Disabled has not worked in [employer]" long time\". No  service. 2. Advanced care decisions No AMD on file, although alert and oriented, due to brain injury, complex medical decisions are challenging. Surrogate medical decision making falls to his mother, however she is elderly and asking her son Madeline Just to make decisions. Long discussion with Madeline Just today. Who tells me discussions concerning feeding tubes have been stared before. He and his family have not wished to pursue this option.  However today he seem quite torn over the decision. Tells me Lorenzo has been disabled for some time, either because of mental illness, or the brain trauma and his quality of life is not robust. Shared with him likely his poor appetite is related to his brain injury and this may wax and wane but not greatly improve enough to prevent dehydration and sustain him for the long term. Option of comfort measures / hospice introduced which can be accomplished at a nursing facility. On hospice he could eat and drink as he liked, but would not get IVF's if he became dehydrated. He would continue his oral maintenance medications for as long as he could take them, this would also include insulin and blood sugar checks, but likely the doses would change. If he were to become hypoglycemic, sub lingual medicines would be given, but not IV dextrose. Copy of POST given to [facility] for \" talking points\" for his family. Code status also addressed; currently full code, but [facility] is speaking with his family and allowing a natural passing. We plan on re discussing tomorrow. 3. Dehydration interplay discussed at some length with [facility] on dehydration and poor oral intake. His renal function has normalized after IVF's. MRI of brain reviewed and shared results with brother. Shared with [facility] IVF's are temporary measures Long term solutions depend on care decisions. 4. Debility lived in group home prior to fall and brain trauma. After fall and d/c from [address] Dave was able to return home with his brother, [facility]. At that time he was fairly independent for all for his ADL's, needed contact guard for walking up stairs, but po intake has been low since fall. After bout with C diff has functionally declined, now seems mostly depend bound. Plan is SNF then long term care. 5. Initial consult note routed to primary continuity provider  6.  Communicated plan of care with: Palliative IDT    GOALS OF CARE:         TREATMENT PREFERENCES:   Code Status: Full Code    Advance Care Planning:  Advance Care Planning 2/22/2018   Patient's Healthcare Decision Maker is: Legal Next of Kin   Primary Decision Maker Name Anrdia Warren   Primary Decision Maker Phone Number [phone]   Primary Decision Maker Relationship to Patient Sibling   Confirm Advance Directive Yes, not on file           Other Instructions: Other:  As far as possible, the palliative care team has discussed with patient / health care proxy about goals of care / treatment preferences for patient. HISTORY:     History obtained from: chart and brother     CHIEF COMPLAINT: AMS    HPI/SUBJECTIVE:    The patient is:   [] Verbal and participatory  [x] Non-participatory due to: difficultly with complex decisions, although verbal and can follow most of the conversation  2/28/2018 MRI of brain   Motion degraded study.     1. No acute infarct, mass effect, or herniation.     2.  Small foci of encephalomalacia involving the frontoparietal region towards the vertex  . The right parietal area does show evidence hemosiderin staining from prior chronic hemorrhage. Etiology is uncertain, perhaps posttraumatic given history or small chronic infarcts.   3. Questionable evidence of prior left frontal chloe hole placement.   4. Grossly normal-appearing pituitary gland. If there is continued clinical suspicion for pituitary abnormality, repeat dedicated MR pituitary imaging when patient is able to be still/cooperative may be helpful.   5. Diffuse mildly complex fluid signal, likely proteinaceous throughout left-sided mastoid air cells and middle ear cavity, which may be inflammatory/related to eustachian tube dysfunction.  Cannot entirely exclude otomastoiditis in the appropriate clinical setting.       Imaging          Clinical Pain Assessment (nonverbal scale for nonverbal patients):    Denies pain         Duration: for how long has pt been experiencing pain (e.g., 2 days, 1 month, years)  Frequency: how often pain is an issue (e.g., several times per day, once every few days, constant)     FUNCTIONAL ASSESSMENT:     Palliative Performance Scale (PPS):  PPS: 30    ECOG  ECOG Status : Limited self-care     PSYCHOSOCIAL/SPIRITUAL SCREENING:      Any spiritual / Hoahaoism concerns:  [] Yes /  [] No    Caregiver Burnout:  [] Yes /  [] No /  [] No Caregiver Present      Anticipatory grief assessment:   [] Normal  / [] Maladaptive        REVIEW OF SYSTEMS:     Positive and pertinent negative findings in ROS are noted above in HPI. The following systems were [] reviewed / [] unable to be reviewed as noted in HPI  Other findings are noted below. Systems: constitutional, ears/nose/mouth/throat, respiratory, gastrointestinal, genitourinary, musculoskeletal, integumentary, neurologic, psychiatric, endocrine. Positive findings noted below. Modified ESAS Completed by: provider                       Dyspnea: 0                    PHYSICAL EXAM:     Wt Readings from Last 3 Encounters:   03/01/18 52.6 kg (115 lb 14.4 oz)   02/26/18 45.4 kg (100 lb 1.4 oz)   02/16/18 54.4 kg (119 lb 14.9 oz)     Blood pressure 95/55, pulse 88, temperature 97.5 °F (36.4 °C), resp. rate 20, height 5' 6\" (1.676 m), weight 52.6 kg (115 lb 14.4 oz), SpO2 99 %. Pain:  Pain Scale 1: Numeric (0 - 10)  Pain Intensity 1: 0                 Last bowel movement: x 1 yesterday    Constitutional: chronically ill, somewhat flat affect, male, reclining in bed, engages in minimal conversation.  In NAD  Respiratory: breathing not labored  Skin: warm, dry  Neurologic: alert, verbal, oriented x 2   Psychiatric: flat affect  Other:       HISTORY:     Principal Problem:    Acute renal failure (ARF) ([facility] (2/14/2018)    Active Problems:    Traumatic brain injury ([facility] [address] (2/3/2018)      IDDM (insulin dependent diabetes mellitus) ([facility] [address] (2/3/2018)      Parkinson's disease ([facility] (2/3/2018)      Psychiatric disorder (2/3/2018)      SILVERIO (acute kidney injury) ([facility] [address] (2/22/2018)      Past Medical History:   Diagnosis Date    Anxiety     Diabetes ([facility] [address]     Ill-defined condition     hypotension    Psychiatric disorder     depression      History reviewed. No pertinent surgical history. History reviewed. No pertinent family history. History reviewed, no pertinent family history.   Social History   Substance Use Topics    Smoking status: Former Smoker    Smokeless tobacco: Never Used    Alcohol use No     No Known Allergies   Current Facility-Administered Medications   Medication Dose Route Frequency    fludrocortisone (FLORINEF) tablet 100 mcg  100 mcg Oral DAILY    [START ON 3/2/2018] VANCOMYCIN INFORMATION NOTE   Other ONCE    vancomycin (VANCOCIN) 1,000 mg in 0.9% sodium chloride (MBP/ADV) 250 mL adv  1,000 mg IntraVENous Q12H    VANCOMYCIN INFORMATION NOTE 1 Each  1 Each Other Rx Dosing/Monitoring    insulin glargine (LANTUS) injection 5 Units  5 Units SubCUTAneous DAILY    insulin lispro (HUMALOG) injection   SubCUTAneous AC&HS    0.45% sodium chloride with KCl 20 mEq/L infusion   IntraVENous CONTINUOUS    magnesium oxide (MAG-OX) tablet 400 mg  400 mg Oral BID    influenza vaccine 2017-18 (3 yrs+)(PF) (FLUZONE QUAD/FLUARIX QUAD) injection 0.5 mL  0.5 mL IntraMUSCular PRIOR TO DISCHARGE    citalopram (CELEXA) tablet 40 mg  40 mg Oral DAILY    midodrine (PROAMITINE) tablet 10 mg  10 mg Oral TID WITH MEALS    modafinil (PROVIGIL) tablet 200 mg  200 mg Oral DAILY    nortriptyline (PAMELOR) capsule 150 mg  150 mg Oral QHS    pramipexole (MIRAPEX) tablet 0.125 mg  0.125 mg Oral TID    acetaminophen (TYLENOL) tablet 650 mg  650 mg Oral Q4H PRN    ondansetron (ZOFRAN) injection 4 mg  4 mg IntraVENous Q4H PRN    glucose chewable tablet 16 g  4 Tab Oral PRN    glucagon (GLUCAGEN) injection 1 mg  1 mg IntraMUSCular PRN    dextrose (D50W) injection syrg 12.5-25 g  25-50 mL IntraVENous PRN        LAB AND IMAGING FINDINGS:     Lab Results   Component Value Date/Time    WBC 8.4 03/01/2018 04:26 AM    HGB 9.5 (L) 03/01/2018 04:26 AM PLATELET 361 86/06/5238 04:26 AM     Lab Results   Component Value Date/Time    Sodium 142 03/01/2018 04:26 AM    Potassium 4.3 03/01/2018 04:26 AM    Chloride 102 03/01/2018 04:26 AM    CO2 26 03/01/2018 04:26 AM    BUN 20 (H) 03/01/2018 04:26 AM    Creatinine 0.85 03/01/2018 04:26 AM    Calcium 8.5 03/01/2018 04:26 AM    Magnesium 2.4 03/01/2018 04:26 AM      Lab Results   Component Value Date/Time    AST (SGOT) 11 (L) 03/01/2018 04:26 AM    Alk. phosphatase 100 03/01/2018 04:26 AM    Protein, total 4.7 (L) 03/01/2018 04:26 AM    Albumin 1.8 (L) 03/01/2018 04:26 AM    Globulin 2.9 03/01/2018 04:26 AM     Lab Results   Component Value Date/Time    INR 1.0 09/12/2011 05:26 AM    Prothrombin time 13.2 09/12/2011 05:26 AM      No results found for: IRON, FE, TIBC, IBCT, PSAT, FERR   No results found for: PH, PCO2, PO2  No components found for: Ac Point   Lab Results   Component Value Date/Time    CK 75 02/14/2018 02:40 PM    CK - MB <1.0 02/14/2018 02:40 PM              Total time: 70 minutes   Counseling / coordination time, spent as noted above: 50 minutes   > 50% counseling / coordination: yes with brother [facility]    Prolonged service was provided for  []30 min   []75 min in face to face time in the presence of the patient, spent as noted above. Time Start:   Time End:   Note: this can only be billed with 29709 (initial) or 05141 (follow up). If multiple start / stop times, list each separately.

## 2018-03-01 NOTE — MANAGEMENT PLAN
Discharge Planning    Spoke with Chico Mahoney at [facility]. They use [facility] at their facility, but would still need [address] Management luis armando set up if this route is taken.          Tia Arellano RN BSN  Outcomes Manager  Pager # [phone]

## 2018-03-01 NOTE — PROGRESS NOTES
RENAL DAILY PROGRESS NOTE            Assessment:   1. SILVERIO-prerenal, resolved  2.recurrent HypoNa hypovolemic-resolved     Plan:   1.nothing more to add from renal     We will sign off   Please call w questions         Sherryle Bowie, MD   ROW:[phone]  3/1/2018  9:26 AM  Subjective:         Objective:   Patient Vitals for the past 24 hrs:   Temp Pulse Resp BP SpO2   03/01/18 0908 98.6 °F (37 °C) 88 - (!) 89/62 100 %   03/01/18 0734 - - - (!) 88/42 -   03/01/18 0442 - - - (!) 82/42 -   03/01/18 0439 97.5 °F (36.4 °C) 89 20 (!) 87/52 100 %   03/01/18 0104 - - - 92/50 -   03/01/18 0102 97.5 °F (36.4 °C) (!) 102 22 (!) 88/50 100 %   03/01/18 0003 97.6 °F (36.4 °C) 100 20 90/53 100 %   02/28/18 2100 - - - (!) 74/38 -   02/28/18 2058 98 °F (36.7 °C) - 18 (!) 75/43 -   02/28/18 1703 98 °F (36.7 °C) 87 18 (!) 84/46 99 %           02/27 1901 - 03/01 0700  In: 1250.8 [P.O.:600;  I.V.:650.8]  Out: 2301 [Urine:2300]    Intake/Output Summary (Last 24 hours) at 03/01/18 0926  Last data filed at 03/01/18 0644   Gross per 24 hour   Intake              600 ml   Output             1500 ml   Net             -900 ml       Physical Exam:  NAD  HEENT: No JVD  Cardiovascular: S1/S2 regular HB  C/L: CTA augie   Abdomen: soft NT/ND  Ext:no Edema     Data Review:     LABS:   Hematology: Recent Labs      03/01/18   0426  02/28/18   0357  02/27/18   0451   WBC  8.4  5.1  4.6   HGB  9.5*  9.3*  9.9*   HCT  29.5*  28.8*  30.3*     Chemistry: Recent Labs      03/01/18   0426  02/28/18   0357  02/27/18   0451   BUN  20*  8  10   CREA  0.85  0.35*  0.39*   CA  8.5  8.3*  8.5   ALB  1.8*   --    --    K  4.3  3.8  3.8   NA  142  143  147*   CL  102  104  108   CO2  26  32  34*   GLU  235*  28*  36*

## 2018-03-02 LAB
ANION GAP SERPL CALC-SCNC: 10 MMOL/L (ref 3–18)
BACTERIA SPEC CULT: NORMAL
BUN SERPL-MCNC: 11 MG/DL (ref 7–18)
BUN/CREAT SERPL: 18 (ref 12–20)
CALCIUM SERPL-MCNC: 7.8 MG/DL (ref 8.5–10.1)
CHLORIDE SERPL-SCNC: 101 MMOL/L (ref 100–108)
CO2 SERPL-SCNC: 29 MMOL/L (ref 21–32)
CREAT SERPL-MCNC: 0.62 MG/DL (ref 0.6–1.3)
ERYTHROCYTE [DISTWIDTH] IN BLOOD BY AUTOMATED COUNT: 15.9 % (ref 11.6–14.5)
GLUCOSE BLD STRIP.AUTO-MCNC: 154 MG/DL (ref 70–110)
GLUCOSE BLD STRIP.AUTO-MCNC: 210 MG/DL (ref 70–110)
GLUCOSE BLD STRIP.AUTO-MCNC: 283 MG/DL (ref 70–110)
GLUCOSE BLD STRIP.AUTO-MCNC: 289 MG/DL (ref 70–110)
GLUCOSE SERPL-MCNC: 258 MG/DL (ref 74–99)
HCT VFR BLD AUTO: 25.9 % (ref 36–48)
HGB BLD-MCNC: 8.4 G/DL (ref 13–16)
MCH RBC QN AUTO: 30.3 PG (ref 24–34)
MCHC RBC AUTO-ENTMCNC: 32.4 G/DL (ref 31–37)
MCV RBC AUTO: 93.5 FL (ref 74–97)
PLATELET # BLD AUTO: 136 K/UL (ref 135–420)
PMV BLD AUTO: 10.7 FL (ref 9.2–11.8)
POTASSIUM SERPL-SCNC: 4.5 MMOL/L (ref 3.5–5.5)
RBC # BLD AUTO: 2.77 M/UL (ref 4.7–5.5)
SERVICE CMNT-IMP: NORMAL
SODIUM SERPL-SCNC: 140 MMOL/L (ref 136–145)
WBC # BLD AUTO: 5.5 K/UL (ref 4.6–13.2)

## 2018-03-02 PROCEDURE — 74011250636 HC RX REV CODE- 250/636: Performed by: HOSPITALIST

## 2018-03-02 PROCEDURE — 74011250637 HC RX REV CODE- 250/637: Performed by: HOSPITALIST

## 2018-03-02 PROCEDURE — 74011636637 HC RX REV CODE- 636/637: Performed by: HOSPITALIST

## 2018-03-02 PROCEDURE — 82962 GLUCOSE BLOOD TEST: CPT

## 2018-03-02 PROCEDURE — 74011250637 HC RX REV CODE- 250/637: Performed by: NURSE PRACTITIONER

## 2018-03-02 PROCEDURE — 36415 COLL VENOUS BLD VENIPUNCTURE: CPT | Performed by: HOSPITALIST

## 2018-03-02 PROCEDURE — 65270000029 HC RM PRIVATE

## 2018-03-02 PROCEDURE — 85027 COMPLETE CBC AUTOMATED: CPT | Performed by: HOSPITALIST

## 2018-03-02 PROCEDURE — 80048 BASIC METABOLIC PNL TOTAL CA: CPT | Performed by: HOSPITALIST

## 2018-03-02 RX ADMIN — MIDODRINE HYDROCHLORIDE 10 MG: 2.5 TABLET ORAL at 12:59

## 2018-03-02 RX ADMIN — INSULIN LISPRO 2 UNITS: 100 INJECTION, SOLUTION INTRAVENOUS; SUBCUTANEOUS at 09:29

## 2018-03-02 RX ADMIN — PRAMIPEXOLE DIHYDROCHLORIDE 0.12 MG: 0.25 TABLET ORAL at 21:05

## 2018-03-02 RX ADMIN — NORTRIPTYLINE HYDROCHLORIDE 150 MG: 25 CAPSULE ORAL at 21:05

## 2018-03-02 RX ADMIN — SODIUM CHLORIDE AND POTASSIUM CHLORIDE: 4.5; 1.49 INJECTION, SOLUTION INTRAVENOUS at 21:05

## 2018-03-02 RX ADMIN — SODIUM CHLORIDE AND POTASSIUM CHLORIDE: 4.5; 1.49 INJECTION, SOLUTION INTRAVENOUS at 00:26

## 2018-03-02 RX ADMIN — INSULIN LISPRO 2 UNITS: 100 INJECTION, SOLUTION INTRAVENOUS; SUBCUTANEOUS at 12:59

## 2018-03-02 RX ADMIN — MODAFINIL 200 MG: 100 TABLET ORAL at 09:32

## 2018-03-02 RX ADMIN — FLUDROCORTISONE ACETATE 100 MCG: 0.1 TABLET ORAL at 09:32

## 2018-03-02 RX ADMIN — SODIUM CHLORIDE 1000 MG: 900 INJECTION, SOLUTION INTRAVENOUS at 00:26

## 2018-03-02 RX ADMIN — INSULIN GLARGINE 5 UNITS: 100 INJECTION, SOLUTION SUBCUTANEOUS at 09:29

## 2018-03-02 RX ADMIN — PRAMIPEXOLE DIHYDROCHLORIDE 0.12 MG: 0.25 TABLET ORAL at 09:32

## 2018-03-02 RX ADMIN — CITALOPRAM HYDROBROMIDE 40 MG: 20 TABLET ORAL at 09:32

## 2018-03-02 RX ADMIN — SODIUM CHLORIDE AND POTASSIUM CHLORIDE: 4.5; 1.49 INJECTION, SOLUTION INTRAVENOUS at 09:25

## 2018-03-02 RX ADMIN — Medication 400 MG: at 09:32

## 2018-03-02 RX ADMIN — INSULIN LISPRO 1 UNITS: 100 INJECTION, SOLUTION INTRAVENOUS; SUBCUTANEOUS at 18:11

## 2018-03-02 RX ADMIN — MIDODRINE HYDROCHLORIDE 10 MG: 2.5 TABLET ORAL at 09:31

## 2018-03-02 NOTE — PROGRESS NOTES
[facility]  Hospitalist Division        Inpatient Daily Progress Note    Daily progress Note    Patient: Mendez Robin MRN: [id number]  CSN: [id number]    YOB: 1960  Age: 56 y.o. Sex: male    DOA: 2/22/2018 LOS:  LOS: 7 days                    Chief Complaint:        Interval History:  Lorenzo Noriega is a 56 y. o. male with history of DM and Dementia, C-Diff (treated with PO vanc), Parkinson's, TBI who presents to the ED from [facility]. Patient apparently had abnormal labs however unclear of actual lab values but apparently NA and BG was elevated. Patient apparently had been having poor PO intake. Patient was just discharged 2/17/18 for ARF along with hypernatremia. Patient presented back here again with noted worsening renal issues along with hypernatremia and hypotension. Hx limited 2/2 patient condition. Patient will be abdmitted for further evaluation. Palliative Care consulted     Subjective:      Awake and alert. Brother at bedside. Denies bowel movement for several days. Objective:      Visit Vitals    BP 95/55 (BP 1 Location: Right arm, BP Patient Position: At rest)    Pulse 88    Temp 97.5 °F (36.4 °C)    Resp 20    Ht 5' 6\" (1.676 m)    Wt 52.6 kg (115 lb 14.4 oz)    SpO2 99%    BMI 18.71 kg/m2           Physical Exam:  General appearance: alert, cooperative, no distress, appears stated agematic  Lungs: clear to auscultation bilaterally  Heart: regular rate and rhythm, S1, S2 normal, no murmur, click, rub or gallop  Abdomen: soft, LUQ TTP, non distended. Normoactive bowel sounds  Extremities: extremities normal, atraumatic, no cyanosis or edema  Skin: Skin color, texture, turgor normal. No rashes or lesions  Neurologic: moves all 4 extremities   PSY: Mood and affect normal, appropriately behaved      Intake and Output:  Current Shift:     Last three shifts:  02/28 0701 - 03/01 1900  In: 2589.2 [P.O.:1320;  I.V.:1269.2]  Out: 1900 [MVEME:0263]    Recent Results (from the past 24 hour(s))   GLUCOSE, POC    Collection Time: 02/28/18  9:00 PM   Result Value Ref Range    Glucose (POC) 561 (HH) 70 - 110 mg/dL   GLUCOSE, POC    Collection Time: 03/01/18  1:06 AM   Result Value Ref Range    Glucose (POC) 446 (HH) 70 - 110 mg/dL   CBC WITH AUTOMATED DIFF    Collection Time: 03/01/18  4:26 AM   Result Value Ref Range    WBC 8.4 4.6 - 13.2 K/uL    RBC 3.13 (L) 4.70 - 5.50 M/uL    HGB 9.5 (L) 13.0 - 16.0 g/dL    HCT 29.5 (L) 36.0 - 48.0 %    MCV 94.2 74.0 - 97.0 FL    MCH 30.4 24.0 - 34.0 PG    MCHC 32.2 31.0 - 37.0 g/dL    RDW 16.0 (H) 11.6 - 14.5 %    PLATELET 013 491 - 574 K/uL    MPV 11.4 9.2 - 11.8 FL    NEUTROPHILS 74 (H) 40 - 73 %    LYMPHOCYTES 19 (L) 21 - 52 %    MONOCYTES 6 3 - 10 %    EOSINOPHILS 0 0 - 5 %    BASOPHILS 1 0 - 2 %    ABS. NEUTROPHILS 6.3 1.8 - 8.0 K/UL    ABS. LYMPHOCYTES 1.6 0.9 - 3.6 K/UL    ABS. MONOCYTES 0.5 0.05 - 1.2 K/UL    ABS. EOSINOPHILS 0.0 0.0 - 0.4 K/UL    ABS. BASOPHILS 0.0 0.0 - 0.06 K/UL    DF AUTOMATED     METABOLIC PANEL, COMPREHENSIVE    Collection Time: 03/01/18  4:26 AM   Result Value Ref Range    Sodium 142 136 - 145 mmol/L    Potassium 4.3 3.5 - 5.5 mmol/L    Chloride 102 100 - 108 mmol/L    CO2 26 21 - 32 mmol/L    Anion gap 14 3.0 - 18 mmol/L    Glucose 235 (H) 74 - 99 mg/dL    BUN 20 (H) 7.0 - 18 MG/DL    Creatinine 0.85 0.6 - 1.3 MG/DL    BUN/Creatinine ratio 24 (H) 12 - 20      GFR est AA >60 >60 ml/min/1.73m2    GFR est non-AA >60 >60 ml/min/1.73m2    Calcium 8.5 8.5 - 10.1 MG/DL    Bilirubin, total 0.4 0.2 - 1.0 MG/DL    ALT (SGPT) 17 16 - 61 U/L    AST (SGOT) 11 (L) 15 - 37 U/L    Alk.  phosphatase 100 45 - 117 U/L    Protein, total 4.7 (L) 6.4 - 8.2 g/dL    Albumin 1.8 (L) 3.4 - 5.0 g/dL    Globulin 2.9 2.0 - 4.0 g/dL    A-G Ratio 0.6 (L) 0.8 - 1.7     MAGNESIUM    Collection Time: 03/01/18  4:26 AM   Result Value Ref Range    Magnesium 2.4 1.6 - 2.6 mg/dL   GLUCOSE, POC    Collection Time: 03/01/18  4:58 AM   Result Value Ref Range    Glucose (POC) 223 (H) 70 - 110 mg/dL   GLUCOSE, POC    Collection Time: 03/01/18  7:44 AM   Result Value Ref Range    Glucose (POC) 114 (H) 70 - 110 mg/dL   OSMOLALITY, UR    Collection Time: 03/01/18 11:35 AM   Result Value Ref Range    Osmolality,urine 766 300 - 900 MOSM/kg H2O   POTASSIUM, UR, RANDOM    Collection Time: 03/01/18 11:35 AM   Result Value Ref Range    Potassium urine, random 57 12.0 - 62 MMOL/L   SODIUM, UR, RANDOM    Collection Time: 03/01/18 11:35 AM   Result Value Ref Range    Sodium urine, random 152 (H) 20 - 110 MMOL/L   GLUCOSE, POC    Collection Time: 03/01/18 11:57 AM   Result Value Ref Range    Glucose (POC) 124 (H) 70 - 110 mg/dL   GLUCOSE, POC    Collection Time: 03/01/18  4:37 PM   Result Value Ref Range    Glucose (POC) 132 (H) 70 - 110 mg/dL           Lab Results   Component Value Date/Time    Glucose 235 (H) 03/01/2018 04:26 AM    Glucose 28 (LL) 02/28/2018 03:57 AM    Glucose 36 (LL) 02/27/2018 04:51 AM    Glucose 144 (H) 02/26/2018 04:46 AM    Glucose 31 (LL) 02/25/2018 05:15 AM        Assessment/Plan:     Patient Active Problem List   Diagnosis Code    C. difficile colitis A04.72    [facility] S06. 9X5A    IDDM (insulin dependent diabetes mellitus) ([facility] [address] E11.9, Z79.4    Parkinson's disease ([facility] [address] G20    Psychiatric disorder F99    Acute renal failure (ARF) (HCC) N17.9    SILVERIO (acute kidney injury) ([facility] [address] N17.9    Advanced care planning/counseling discussion Z71.89    Debility R53.81     Plan:     Acute Renal Failure  -2/2 dehydration- Creatinine improved      Hypovolemic Hypernatermia  - Resolved      Hypotension  -2/2 dehydration- continue IVF   -blood cultures pending        Deconditioning  -PT/OT eval and treat      DM-II  -SSI  -Lantus      Psych disorder/Parkinsons  -mirapex  -celexa     Hx of C-diff  -S/p completion of po vanc  -last negative for c-diff     DVT prophylaxis  - Heparin     Palliative Care Consult       Gerald Joy, FNP-BC  [address]  Hospitalist Division  Pager:  [phone]  Office:  [phone]

## 2018-03-02 NOTE — ROUTINE PROCESS
1925 Bedside and Verbal shift change report given to Olamide RN (oncoming nurse) by Flip Montanez RN (offgoing nurse). Report included the following information SBAR, Kardex, Intake/Output, MAR and Recent Results.

## 2018-03-02 NOTE — PROGRESS NOTES
IDR Summary      Patient: Hussein Oro MRN: [id number]    Age: 56 y.o.  : 1960     Admit Diagnosis: SILVERIO (acute kidney injury) [facility]      Problems pertinent to hospital stay: r/o C-diff - family states he was taken off isolation at [facility]- when first admitted he didn't have BM- was placed on isolation on  but no BM in 2 days    Consults:Speech and Case Management     Testing due for patient today? NO    Nutrition plan:Yes     Mobility needs: Yes      Lines/Tubes:   IV: YES   Needed: YES  Andujar: YES  Needed:NO  Central Line: NO Needed: NO      VTE Prophylaxis: Mechanical    Influenza Vaccine received? NO        Care Management:  Discharge plan: [facility]  PCP: Nighat Iniguez MD    : NO  Financial concerns:No   Interventions:       LOS: 8 days     Expected days until discharge: TBD- awaiting rehab    Signed:      [facility]  Hospitalist Division  Pager:  [phone]  Office:  [phone]

## 2018-03-02 NOTE — ROUTINE PROCESS
Bedside and Verbal shift change report given to Janel Grossman (oncoming nurse) by Sven Barboza (offgoing nurse). Report included the following information SBAR, Kardex, Intake/Output and MAR. Bedside and Verbal shift change report given to Carri Prince (oncoming nurse) by Janel Grossman (offgoing nurse). Report included the following information SBAR, Kardex, Intake/Output and MAR.

## 2018-03-02 NOTE — PROGRESS NOTES
[facility]  Hospitalist Division        Inpatient Daily Progress Note    Daily progress Note    Patient: Shashi Cheek MRN: [id number]  [facility]: [id number]    YOB: 1960  Age: 56 y.o. Sex: male    DOA: 2/22/2018 LOS:  LOS: 8 days                    Chief Complaint:        Interval History:  Lorenzo Noriega is a 56 y. o. male with history of DM and Dementia, C-Diff (treated with PO vanc), Parkinson's, TBI who presents to the ED from [facility]. Patient apparently had abnormal labs however unclear of actual lab values but apparently NA and BG was elevated. Patient apparently had been having poor PO intake. Patient was just discharged 2/17/18 for ARF along with hypernatremia. Patient presented back here again with noted worsening renal issues along with hypernatremia and hypotension. Hx limited 2/2 patient condition. Patient will be abdmitted for further evaluation. Palliative Care consulted and following. Discussion had with patient's brother regarding hospice care for patient- he was not ready to make decision. Will attempt to discuss with brother in am 3/3. Subjective:      Sleeping- easily awoken- no complaints of pain. Objective:      Visit Vitals    /57 (BP 1 Location: Left arm)    Pulse 85    Temp 97.6 °F (36.4 °C)    Resp 20    Ht 5' 6\" (1.676 m)    Wt 55.1 kg (121 lb 7.6 oz)    SpO2 99%    BMI 19.61 kg/m2           Physical Exam:  General appearance: alert, cooperative, no distress, appears stated agematic  Lungs: clear to auscultation throughout, no wheezes or rhonchi   Heart: regular rate and rhythm, S1, S2 normal, no murmur, click, rub or gallop  Abdomen: soft, non tender, non distended.  Active bowel sounds   Extremities: extremities normal, atraumatic, no cyanosis or edema  Skin: Skin color, texture, turgor normal. No rashes or lesions  Neurologic: moves all 4 extremities   PSY: appropriately behaved      Intake and Output:  Current Shift:  03/02 0701 - 03/02 1900  In: 240 [P.O.:240]  Out: -   Last three shifts:  02/28 1901 - 03/02 0700  In: 1940 [P.O.:720; I.V.:1220]  Out: 1300 [Urine:1300]    Recent Results (from the past 24 hour(s))   GLUCOSE, POC    Collection Time: 03/01/18  4:37 PM   Result Value Ref Range    Glucose (POC) 132 (H) 70 - 110 mg/dL   GLUCOSE, POC    Collection Time: 03/01/18  9:15 PM   Result Value Ref Range    Glucose (POC) 197 (H) 70 - 774 mg/dL   METABOLIC PANEL, BASIC    Collection Time: 03/02/18  5:18 AM   Result Value Ref Range    Sodium 140 136 - 145 mmol/L    Potassium 4.5 3.5 - 5.5 mmol/L    Chloride 101 100 - 108 mmol/L    CO2 29 21 - 32 mmol/L    Anion gap 10 3.0 - 18 mmol/L    Glucose 258 (H) 74 - 99 mg/dL    BUN 11 7.0 - 18 MG/DL    Creatinine 0.62 0.6 - 1.3 MG/DL    BUN/Creatinine ratio 18 12 - 20      GFR est AA >60 >60 ml/min/1.73m2    GFR est non-AA >60 >60 ml/min/1.73m2    Calcium 7.8 (L) 8.5 - 10.1 MG/DL   CBC W/O DIFF    Collection Time: 03/02/18  5:18 AM   Result Value Ref Range    WBC 5.5 4.6 - 13.2 K/uL    RBC 2.77 (L) 4.70 - 5.50 M/uL    HGB 8.4 (L) 13.0 - 16.0 g/dL    HCT 25.9 (L) 36.0 - 48.0 %    MCV 93.5 74.0 - 97.0 FL    MCH 30.3 24.0 - 34.0 PG    MCHC 32.4 31.0 - 37.0 g/dL    RDW 15.9 (H) 11.6 - 14.5 %    PLATELET 122 711 - 331 K/uL    MPV 10.7 9.2 - 11.8 FL   GLUCOSE, POC    Collection Time: 03/02/18  7:45 AM   Result Value Ref Range    Glucose (POC) 289 (H) 70 - 110 mg/dL   GLUCOSE, POC    Collection Time: 03/02/18 11:52 AM   Result Value Ref Range    Glucose (POC) 283 (H) 70 - 110 mg/dL           Lab Results   Component Value Date/Time    Glucose 258 (H) 03/02/2018 05:18 AM    Glucose 235 (H) 03/01/2018 04:26 AM    Glucose 28 (LL) 02/28/2018 03:57 AM    Glucose 36 (LL) 02/27/2018 04:51 AM    Glucose 144 (H) 02/26/2018 04:46 AM        Assessment/Plan:     Patient Active Problem List   Diagnosis Code    C. difficile colitis A04.72    Traumatic brain injury ([facility] S06. 9X9A    IDDM (insulin dependent diabetes mellitus) ([facility] E11.9, Z79.4    Parkinson's disease ([facility] G20    Psychiatric disorder F99    Acute renal failure (ARF) ([facility] N17.9    [facility]ILVERIO [facility]) [facility] N17.9    Advanced care planning/counseling discussion Z71.89    Debility R53.81     Plan:     Acute Renal Failure  - Resolved      Hypovolemic Hypernatermia  - Resolved      Hypotension  - 2/2 dehydration- continue IVF   - blood cultures pending      Deconditioning  -PT/OT eval and treat      DM-II  -SSI  -Lantus      Psych disorder/Parkinsons  - mirapex  - celexa     Hx of C-diff  -S/p completion of po vanc  -last negative for c-diff     DVT prophylaxis  - Heparin     Palliative Care following- in contact with patient's brother regarding care decisions           Lizette Martino, VIVIANE-BC  [address]  Hospitalist Division  Pager:  [phone]  Office:  [phone]

## 2018-03-02 NOTE — MANAGEMENT PLAN
Discharge Planning    Discharge/Transition Plan is [facility] for SNF rehab and transition to [address].  Updated Mery SHAW BSN  Outcomes Manager  Pager # [phone]

## 2018-03-02 NOTE — PALLIATIVE CARE
No message from brother Juan Macedo to discuss care goals, call to his number no answer not at bedside. We will continue to follow.

## 2018-03-02 NOTE — PROGRESS NOTES
Problem: Falls - Risk of  Goal: *Absence of Falls  Document Ernie Fall Risk and appropriate interventions in the flowsheet.    Outcome: Progressing Towards Goal  Fall Risk Interventions:       Mentation Interventions: Adequate sleep, hydration, pain control, Door open when patient unattended    Medication Interventions: Evaluate medications/consider consulting pharmacy    Elimination Interventions: Call light in reach, Patient to call for help with toileting needs

## 2018-03-03 VITALS
HEIGHT: 66 IN | SYSTOLIC BLOOD PRESSURE: 92 MMHG | TEMPERATURE: 98 F | BODY MASS INDEX: 19.51 KG/M2 | DIASTOLIC BLOOD PRESSURE: 51 MMHG | OXYGEN SATURATION: 94 % | RESPIRATION RATE: 20 BRPM | WEIGHT: 121.4 LBS | HEART RATE: 87 BPM

## 2018-03-03 LAB
ANION GAP SERPL CALC-SCNC: 2 MMOL/L (ref 3–18)
BUN SERPL-MCNC: 9 MG/DL (ref 7–18)
BUN/CREAT SERPL: 18 (ref 12–20)
CALCIUM SERPL-MCNC: 8.1 MG/DL (ref 8.5–10.1)
CHLORIDE SERPL-SCNC: 103 MMOL/L (ref 100–108)
CO2 SERPL-SCNC: 37 MMOL/L (ref 21–32)
CREAT SERPL-MCNC: 0.5 MG/DL (ref 0.6–1.3)
ERYTHROCYTE [DISTWIDTH] IN BLOOD BY AUTOMATED COUNT: 16.1 % (ref 11.6–14.5)
GLUCOSE BLD STRIP.AUTO-MCNC: 194 MG/DL (ref 70–110)
GLUCOSE BLD STRIP.AUTO-MCNC: 96 MG/DL (ref 70–110)
GLUCOSE SERPL-MCNC: 78 MG/DL (ref 74–99)
HCT VFR BLD AUTO: 27.5 % (ref 36–48)
HGB BLD-MCNC: 8.7 G/DL (ref 13–16)
MCH RBC QN AUTO: 29.9 PG (ref 24–34)
MCHC RBC AUTO-ENTMCNC: 31.6 G/DL (ref 31–37)
MCV RBC AUTO: 94.5 FL (ref 74–97)
PLATELET # BLD AUTO: 164 K/UL (ref 135–420)
PMV BLD AUTO: 10.3 FL (ref 9.2–11.8)
POTASSIUM SERPL-SCNC: 4.2 MMOL/L (ref 3.5–5.5)
RBC # BLD AUTO: 2.91 M/UL (ref 4.7–5.5)
SODIUM SERPL-SCNC: 142 MMOL/L (ref 136–145)
WBC # BLD AUTO: 4.6 K/UL (ref 4.6–13.2)

## 2018-03-03 PROCEDURE — 74011636637 HC RX REV CODE- 636/637: Performed by: HOSPITALIST

## 2018-03-03 PROCEDURE — 74011250636 HC RX REV CODE- 250/636: Performed by: HOSPITALIST

## 2018-03-03 PROCEDURE — 80048 BASIC METABOLIC PNL TOTAL CA: CPT | Performed by: NURSE PRACTITIONER

## 2018-03-03 PROCEDURE — 85027 COMPLETE CBC AUTOMATED: CPT | Performed by: NURSE PRACTITIONER

## 2018-03-03 PROCEDURE — 82962 GLUCOSE BLOOD TEST: CPT

## 2018-03-03 PROCEDURE — 74011250637 HC RX REV CODE- 250/637: Performed by: HOSPITALIST

## 2018-03-03 PROCEDURE — 74011250637 HC RX REV CODE- 250/637: Performed by: NURSE PRACTITIONER

## 2018-03-03 PROCEDURE — 36415 COLL VENOUS BLD VENIPUNCTURE: CPT | Performed by: NURSE PRACTITIONER

## 2018-03-03 RX ORDER — INSULIN LISPRO 100 [IU]/ML
INJECTION, SOLUTION INTRAVENOUS; SUBCUTANEOUS
Qty: 1 VIAL | Refills: 0 | Status: SHIPPED
Start: 2018-03-03

## 2018-03-03 RX ORDER — INSULIN GLARGINE 100 [IU]/ML
5 INJECTION, SOLUTION SUBCUTANEOUS
Qty: 1 VIAL | Refills: 0 | Status: SHIPPED
Start: 2018-03-03

## 2018-03-03 RX ORDER — ALPRAZOLAM 0.25 MG/1
0.25 TABLET ORAL
Qty: 10 TAB | Refills: 0 | Status: SHIPPED | OUTPATIENT
Start: 2018-03-03

## 2018-03-03 RX ORDER — FLUDROCORTISONE ACETATE 0.1 MG/1
0.1 TABLET ORAL DAILY
Qty: 30 TAB | Refills: 0 | Status: SHIPPED
Start: 2018-03-03

## 2018-03-03 RX ADMIN — INSULIN GLARGINE 5 UNITS: 100 INJECTION, SOLUTION SUBCUTANEOUS at 09:36

## 2018-03-03 RX ADMIN — SODIUM CHLORIDE AND POTASSIUM CHLORIDE: 4.5; 1.49 INJECTION, SOLUTION INTRAVENOUS at 07:25

## 2018-03-03 RX ADMIN — MIDODRINE HYDROCHLORIDE 10 MG: 2.5 TABLET ORAL at 09:24

## 2018-03-03 RX ADMIN — PRAMIPEXOLE DIHYDROCHLORIDE 0.12 MG: 0.25 TABLET ORAL at 09:35

## 2018-03-03 RX ADMIN — Medication 400 MG: at 09:25

## 2018-03-03 RX ADMIN — FLUDROCORTISONE ACETATE 100 MCG: 0.1 TABLET ORAL at 09:25

## 2018-03-03 RX ADMIN — MODAFINIL 200 MG: 100 TABLET ORAL at 09:34

## 2018-03-03 RX ADMIN — CITALOPRAM HYDROBROMIDE 40 MG: 20 TABLET ORAL at 09:34

## 2018-03-03 NOTE — PROGRESS NOTES
Problem: Falls - Risk of  Goal: *Absence of Falls  Document Ernie Fall Risk and appropriate interventions in the flowsheet.    Outcome: Progressing Towards Goal  Fall Risk Interventions:  Mobility Interventions: Bed/chair exit alarm, PT Consult for mobility concerns    Mentation Interventions: Adequate sleep, hydration, pain control    Medication Interventions: Bed/chair exit alarm    Elimination Interventions: Call light in reach, Patient to call for help with toileting needs, Urinal in reach

## 2018-03-03 NOTE — DISCHARGE SUMMARY
[facility] [place]    Patient: Shashi Cheek MRN: [id number]  CSN: [id number]    YOB: 1960  Age: 56 y.o. Sex: male    DOA: 2/22/2018 LOS:  LOS: 9 days   Discharge Date: 3/3/2018     Admission Diagnoses: SILVERIO (acute kidney injury) [facility])    Discharge Diagnoses:    Problem List as of 3/3/2018  Date Reviewed: 2/22/2018          Codes Class Noted - Resolved    Advanced care planning/counseling discussion ICD-10-CM: Z71.89  ICD-9-CM: V65.49  3/1/2018 - Present        Debility ICD-10-CM: R53.81  ICD-9-CM: 799.3  3/1/2018 - Present        SILVERIO (acute kidney injury) ([facility] ICD-10-CM: N17.9  ICD-9-CM: 584.9  2/22/2018 - Present        * (Principal)Acute renal failure (ARF) ([facility] ICD-10-CM: N17.9  ICD-9-CM: 584.9  2/14/2018 - Present        C. difficile colitis ICD-10-CM: A04.72  ICD-9-CM: 008.45  2/3/2018 - Present        Traumatic brain injury ([facility] ICD-10-CM: S06. 9X9A  ICD-9-CM: 854.00  2/3/2018 - Present        IDDM (insulin dependent diabetes mellitus) ([facility] ICD-10-CM: E11.9, Z79.4  ICD-9-CM: 250.00, V58.67  2/3/2018 - Present        Parkinson's disease ([facility] ICD-10-CM: [employer]  ICD-9-CM: 332.0  2/3/2018 - Present        Psychiatric disorder ICD-10-CM: F99  ICD-9-CM: 300.9  2/3/2018 - Present              Discharge Condition: Stable    Discharge To: SNF    Consults: Hospitalist and Jesse Course: Tellis Ice a 56 y. o. male with history of DM and Dementia, C-Diff (treated with PO vanc), Parkinson's, TBI who presented to the ED from [facility]. Patient apparently had abnormal labs however unclear of actual lab values but apparently NA and BG were elevated. Patient apparently had been having poor PO intake. Patient was just discharged 2/17/18 for ARF along with hypernatremia. Patient presented back here again with noted worsening renal issues along with hypernatremia and hypotension. Hx limited 2/2 patient condition. Patient was abdmitted for further evaluation.  Palliative Care consulted and following. Discussion had with patient's brother regarding hospice care for patient- he was not ready to make decision, however, the patient's brother did say he did not want the patient to have a PEG tube. Patient's brother was supposed to contact Palliative Care with decision regarding hospice or continue with full treatment and he did not follow up. Hyponatremia corrected with IVF. MRI brain without acute process. Pituitary gland grossly-appearing, however, patient would need repeat dedicated MRI pituitary imaging when patient is able to be still/cooperative may be helpful. Damage to the pituitary gland and/or decreased PO intake can cause the patient's repeated symptoms. Patient will need to completed PO Vanc treatment as previously prescribed for c-diff infection. Patient is at the end of taper as listed below. Patient is appropriate for discharge to SNF.        Physical Exam:  General appearance: alert, cooperative, no distress, appears stated agematic  Lungs: clear to auscultation throughout, no wheezes or rhonchi   Heart: regular rate and rhythm, S1, S2 normal, no murmur, click, rub or gallop  Abdomen: soft, non tender, non distended. Active bowel sounds   Extremities: extremities normal, atraumatic, no cyanosis or edema  Skin: Skin color, texture, turgor normal. No rashes or lesions  Neurologic: moves all 4 extremities   PSY: appropriately behaved    Significant Diagnostic Studies:  MRI Brain W WO Contrast 2/28/2018  IMPRESSION:     Motion degraded study. 1.  No acute infarct, mass effect, or herniation.   2.  Small foci of encephalomalacia involving the frontoparietal region towards the vertex  . The right parietal area does show evidence hemosiderin staining from prior chronic hemorrhage. Etiology is uncertain, perhaps posttraumatic given history or small chronic infarcts. 3. Questionable evidence of prior left frontal chloe hole placement. 4. Grossly normal-appearing pituitary gland. If there is continued clinical suspicion for pituitary abnormality, repeat dedicated MR pituitary imaging when patient is able to be still/cooperative may be helpful. 5. Diffuse mildly complex fluid signal, likely proteinaceous throughout left-sided mastoid air cells and middle ear cavity, which may be  inflammatory/related to eustachian tube dysfunction. Cannot entirely exclude otomastoiditis in the appropriate clinical setting. Discharge Medications:     Current Discharge Medication List      START taking these medications    Details   fludrocortisone (FLORINEF) 0.1 mg tablet Take 1 Tab by mouth daily. Qty: 30 Tab, Refills: 0      insulin lispro (HUMALOG) 100 unit/mL injection For Blood Sugar (mg/dL) of:     Less than 199 =   0 units           200 -249 =   1 units  250 -299 =  2 units  300 -349 =   3 units  350 and above = 4units   Initiate Hypoglycemia protocol if blood glucose is <70 mg/dL  Indications: type 2 diabetes mellitus  Qty: 1 Vial, Refills: 0         CONTINUE these medications which have CHANGED    Details   ALPRAZolam (XANAX) 0.25 mg tablet Take 1 Tab by mouth every eight (8) hours as needed for Anxiety. Max Daily Amount: 0.75 mg. Qty: 10 Tab, Refills: 0    Associated Diagnoses: Abdominal pain, generalized; Anxiety      insulin glargine (LANTUS) 100 unit/mL injection 5 Units by SubCUTAneous route nightly. Qty: 1 Vial, Refills: 0      vancomycin 50 mg/mL oral solution (compounded) Take 2.5 mL by mouth every six (6) hours. 125 mg po q 3 days x 14 days  Qty: 1 Bottle, Refills: 0         CONTINUE these medications which have NOT CHANGED    Details   citalopram (CELEXA) 40 mg tablet Take 1 Tab by mouth daily. Indications: ANXIETY WITH DEPRESSION  Qty: 10 Tab, Refills: 0      midodrine (PROAMITINE) 10 mg tablet Take 10 mg by mouth three (3) times daily (with meals). Indications: Hold for SBP > 130      modafinil (PROVIGIL) 200 mg tablet Take 200 mg by mouth daily.       nortriptyline (PAMELOR) 75 mg capsule Take 150 mg by mouth nightly. Indications: 150mg at dinner and 75mg at hs      pramipexole (MIRAPEX) 0.125 mg tablet Take 0.125 mg by mouth three (3) times daily. Indications: IDIOPATHIC PARKINSONISM         STOP taking these medications       insulin aspart (NOVOLOG) 100 unit/mL injection Comments:   Reason for Stopping:               Activity: Activity as tolerated and PT/OT Eval and Treat    Diet: Diabetic Diet;      Wound Care: None needed    Follow-up: Facility provider on admission     Discharge time: 45 minutes   Treva Scheuermann, NP  3/3/2018, 8:40 AM

## 2018-03-03 NOTE — ROUTINE PROCESS
2010: Assumed care. Awake. In bed. Resting quietly. Denies any pain or discomfort at this time. Call light within reach. 2105: Due meds given. 2200: . No coverage given per protocol. HS snack offered. Refused. 2330: No change from previous assessment. 0200: Sleeping.    0441: No change from previous assessment. 0600: Slept good thru night. Needs attended. 0730: Bedside and Verbal shift change report given to Alexander Rasheed RN (oncoming nurse) by me (offgoing nurse). Report included the following information SBAR, Kardex, Intake/Output, MAR and Recent Results.

## 2018-03-03 NOTE — ROUTINE PROCESS
Bedside and Verbal shift change report given to [facility] (oncoming nurse) by Yasmine Evans (offgoing nurse). Report included the following information SBAR, Kardex, Intake/Output, MAR, Recent Results and Cardiac Rhythm SR.     0788 Pt's current blood pressure 88/57. Will administer Midodrine and recheck. Pt is stable with no signs or symptoms of low blood pressure. Will continue to monitor. 1162 Pt's current blood pressure 92/58. Pt initially refused medications based on the taste. Informed pt that medication could be mixed with pudding. Pt agreed. Medication administered. Will continue to monitor. 1100 Report called to [facility] by Essence Conley RN.     1130 Pt resting in bed with no complaints of pain and no change to condition. Pt's brother at bedside. Will continue to monitor. 1145 Pt's PICC line removed also by Tyrese Yost RN.     4483 Pt's current blood pressure 92/51. Pt is stable. 1245 Transport at bedside to transport pt. Pt's brother at bedside also.

## 2018-03-03 NOTE — PROGRESS NOTES
Case management notes indicate the patient was admitted from [facility] however he has made a decisions not to return to [facility] and has selected [facility] for rehab. I spoke to [facility] and she stated she can accept this patient today. Nursing to call report to [phone]. PCS form and checklist to nurses station. [facility] to transport at 1230 pm.  Spoke to patient and his  brother he agrees with discharge plan.  Aquiles Florentino RN

## 2018-03-03 NOTE — ROUTINE PROCESS
Bedside, Verbal and Written shift change report given to Brandee Pollard RN (oncoming nurse) by Niles Waggoner RN (offgoing nurse). Report included the following information SBAR, Kardex, Intake/Output, MAR, Recent Results, Med Rec Status, Procedure Summary and Cardiac Rhythm Non-Telemetry.      0720 - Shift assessment completed. Pt alert and oriented x4. No respiratory distress noted. No c/o pain reported. Call bell within reach, bed in low position. Will continue to monitor.      1220 - Shift re-assessment completed, no change in pt condition.      1620 - Shift re-assessment completed, no change in pt condition.      1812 - Pt refused to take PO medications at this time. Bedside, Verbal and Written shift change report given to [place] RN (oncoming nurse) by Brandee Pollard RN (offgoing nurse). Report included the following information SBAR, Kardex, Intake/Output, MAR, Recent Results, Med Rec Status, Procedure Summary and Cardiac Rhythm Non-Telemetry.

## 2018-03-04 NOTE — PROGRESS NOTES
Response to query. Pt's albumin was 1.8 and his weight was 55 kg. He was malnourished as noted in the query .

## 2019-06-12 NOTE — PROGRESS NOTES
Spoke to dr Esteban Joe he will dc pt today, notified pt and his brother. Soah Segovia, pts brother wants to transport pt there. He will  at 3pm envelope in nurses station, notified pt and nurse. 12-Jun-2019 15:54

## 2021-08-03 PROBLEM — N17.9 ACUTE RENAL FAILURE (ARF) (HCC): Status: RESOLVED | Noted: 2018-02-14 | Resolved: 2021-08-03
